# Patient Record
Sex: FEMALE | Race: WHITE | Employment: UNEMPLOYED | URBAN - METROPOLITAN AREA
[De-identification: names, ages, dates, MRNs, and addresses within clinical notes are randomized per-mention and may not be internally consistent; named-entity substitution may affect disease eponyms.]

---

## 2017-01-13 ENCOUNTER — TELEPHONE (OUTPATIENT)
Dept: OBGYN | Facility: CLINIC | Age: 27
End: 2017-01-13

## 2017-01-13 DIAGNOSIS — O21.0 HYPEREMESIS GRAVIDARUM: Primary | ICD-10-CM

## 2017-01-13 NOTE — TELEPHONE ENCOUNTER
rantitidine 150 mg tablets        Last Written Prescription Date: 4/1/16  Last Fill Quantity: 600mL, # refills: 4  Last Office Visit with G, P or [facility] prescribing provider: 9/2/16        BP Readings from Last 3 Encounters:   09/02/16 122/78   07/29/16 126/62   07/25/16 118/68     AST       38   1/2/2016  ALT       93   1/2/2016  CREATININE   Date Value Ref Range Status   01/02/2016 0.58 0.52 - 1.04 mg/dL Final

## 2017-01-13 NOTE — TELEPHONE ENCOUNTER
"This was removed from the medication list, but patient taking this.  Ok to continue per Dr Houser's notes on 1/11: \"continues to use home IV therapy and IV antiemetics     -oral (liquid) Zantac\"    Will route to OB provider.    Etta Bravo, RN  Message handled by Nurse Triage.      "

## 2017-01-19 ENCOUNTER — TELEPHONE (OUTPATIENT)
Dept: OBGYN | Facility: CLINIC | Age: 27
End: 2017-01-19

## 2017-01-19 NOTE — TELEPHONE ENCOUNTER
Pts  calling, they have concerns over a breast lump she has discovered on far side of her right breast.  She describes it as pea sized, maybe a bit larger, and is not painful.  Pt is breast feeding.  She would like appointment to get it checked.  Could you fit her in on Friday?  Please advise.

## 2017-01-20 NOTE — TELEPHONE ENCOUNTER
Called pt's , vm left for pt to call clinic back.    Called pt, vm left for pt to call clinic back

## 2017-01-20 NOTE — TELEPHONE ENCOUNTER
Pt's  calls back, relay wanted to schedule appt for pt, but MD is ending his day at this time.    Discussed with Dr. Houser, okayed appt for Monday.    Called pt's , scheduled appt for Mon.

## 2017-01-23 ENCOUNTER — OFFICE VISIT (OUTPATIENT)
Dept: OBGYN | Facility: CLINIC | Age: 27
End: 2017-01-23
Payer: COMMERCIAL

## 2017-01-23 VITALS
BODY MASS INDEX: 25.8 KG/M2 | SYSTOLIC BLOOD PRESSURE: 116 MMHG | WEIGHT: 159.8 LBS | DIASTOLIC BLOOD PRESSURE: 76 MMHG | TEMPERATURE: 98.5 F

## 2017-01-23 DIAGNOSIS — L98.9 SKIN LESION: Primary | ICD-10-CM

## 2017-01-23 DIAGNOSIS — K21.9 GASTROESOPHAGEAL REFLUX DISEASE WITHOUT ESOPHAGITIS: ICD-10-CM

## 2017-01-23 PROCEDURE — 99213 OFFICE O/P EST LOW 20 MIN: CPT | Performed by: OBSTETRICS & GYNECOLOGY

## 2017-01-23 RX ORDER — PANTOPRAZOLE SODIUM 40 MG/1
40 TABLET, DELAYED RELEASE ORAL DAILY
Qty: 30 TABLET | Refills: 1 | Status: SHIPPED | OUTPATIENT
Start: 2017-01-23 | End: 2017-03-24

## 2017-01-23 NOTE — PROGRESS NOTES
Here after noting small mass 'in R breast'    -present for about one week/nontender    -lactating    She also notes continuing acid reflux; using Zantac 150mg twice a day    O: small raised lesion (3 mm) in skin of R breast         3 cm from nipple at 4 o'clock       -no mass noted in breast       -no axillary LN    A: small lesion in skin of R breast      GERD; will add Protonix       -if persistent, referral to GI    P: continue to monitor/expect spontaneous resolution

## 2017-01-23 NOTE — NURSING NOTE
"Chief Complaint   Patient presents with     Breast Exam     lump on right breast x 1 month, headaches on and off since 7/2016.       Initial /76 mmHg  Temp(Src) 98.5  F (36.9  C) (Oral)  Wt 159 lb 12.8 oz (72.485 kg)  LMP 08/20/2016 (Exact Date) Estimated body mass index is 25.8 kg/(m^2) as calculated from the following:    Height as of 9/2/16: 5' 6\" (1.676 m).    Weight as of this encounter: 159 lb 12.8 oz (72.485 kg).  BP completed using cuff size: regular  Tran Centeno MA      "

## 2017-03-24 DIAGNOSIS — K21.9 GASTROESOPHAGEAL REFLUX DISEASE WITHOUT ESOPHAGITIS: ICD-10-CM

## 2017-03-24 NOTE — TELEPHONE ENCOUNTER
pantoprazole      Last Written Prescription Date: 1/23/17  Last Fill Quantity: 1/23/17,  # refills: 1   Last Office Visit with [facility], [facility] or [facility] prescribing provider: 1/23/17

## 2017-03-27 PROBLEM — K21.9 GASTROESOPHAGEAL REFLUX DISEASE WITHOUT ESOPHAGITIS: Status: ACTIVE | Noted: 2017-03-27

## 2017-03-27 RX ORDER — PANTOPRAZOLE SODIUM 40 MG/1
40 TABLET, DELAYED RELEASE ORAL DAILY
Qty: 30 TABLET | Refills: 1 | Status: SHIPPED | OUTPATIENT
Start: 2017-03-27 | End: 2018-03-15

## 2017-03-27 NOTE — TELEPHONE ENCOUNTER
Your last note:    GERD; will add Protonix  -if persistent, referral to GI    Do you want to refer or just refill?    Wanda Bryan R.N.

## 2017-04-03 NOTE — TELEPHONE ENCOUNTER
Spoke to pt and gave number for GI, pt still has sx and they are not getting better she states.  Referral entered.  Wanda Bryan R.N.

## 2017-04-03 NOTE — TELEPHONE ENCOUNTER
called back.  Told him to have pt cb as we dont have signed consent to speak with him.  He will let her know.  Wanda Bryan R.N.

## 2017-04-04 ENCOUNTER — TRANSFERRED RECORDS (OUTPATIENT)
Dept: HEALTH INFORMATION MANAGEMENT | Facility: CLINIC | Age: 27
End: 2017-04-04

## 2017-04-13 ENCOUNTER — TRANSFERRED RECORDS (OUTPATIENT)
Dept: HEALTH INFORMATION MANAGEMENT | Facility: CLINIC | Age: 27
End: 2017-04-13

## 2017-05-19 ENCOUNTER — TELEPHONE (OUTPATIENT)
Dept: OBGYN | Facility: CLINIC | Age: 27
End: 2017-05-19

## 2017-05-19 NOTE — TELEPHONE ENCOUNTER
Please advise      -it is good that she calls if still having 'heartburn' using Protonix; in this situation, further evaluation is usually important.  Recommend f/u with PCP or GI specialist

## 2017-05-19 NOTE — TELEPHONE ENCOUNTER
Call from spouse stating that pt continues to have heartburn and they are wondering if there is something else she could try. Pt currently takes Protonix 40 mg daily. States the heartburn started when she was pregnant and continues. Pt also previously tried Zantac liquid. Please advise.

## 2018-02-26 ENCOUNTER — OFFICE VISIT (OUTPATIENT)
Dept: OBGYN | Facility: CLINIC | Age: 28
End: 2018-02-26
Payer: MEDICAID

## 2018-02-26 VITALS
HEART RATE: 76 BPM | DIASTOLIC BLOOD PRESSURE: 60 MMHG | WEIGHT: 140 LBS | BODY MASS INDEX: 22.6 KG/M2 | SYSTOLIC BLOOD PRESSURE: 112 MMHG

## 2018-02-26 DIAGNOSIS — O21.0 HYPEREMESIS GRAVIDARUM: Primary | ICD-10-CM

## 2018-02-26 PROCEDURE — 99213 OFFICE O/P EST LOW 20 MIN: CPT | Performed by: OBSTETRICS & GYNECOLOGY

## 2018-02-26 RX ORDER — ONDANSETRON 8 MG/1
8 TABLET, FILM COATED ORAL EVERY 8 HOURS PRN
Qty: 30 TABLET | Refills: 1 | Status: ON HOLD | OUTPATIENT
Start: 2018-02-26 | End: 2018-10-13

## 2018-02-26 NOTE — LETTER
[facility]  [address], [address]  [place], [place]  [address]                                            TEL:[phone]  FAX:[phone]      Olimpia Bhakta  [address] [facility] [address]      February 26, 2018    To Whom It May Concern;    Ms. Olimpia Bhakta has a medical condition that caused vomiting and necessitated       -her leaving work.  She is her today for therapy        Sincerely,      Paula WILD

## 2018-02-26 NOTE — MR AVS SNAPSHOT
After Visit Summary   2/26/2018    Olimpia Bhakta    MRN: [id number]           Patient Information     Date Of Birth          1990        Visit Information        Provider Department      2/26/2018 6:00 PM Aubrey Houser MD [facility]        Today's Diagnoses     Hyperemesis gravidarum    -  1       Follow-ups after your visit        Your next 10 appointments already scheduled     Mar 15, 2018  1:15 PM CDT   (Arrive by 1:00 PM)   US OB > 14 WEEKS COMPLETE SINGLE with RIUS1   [facility]    [address]  [facility] [address]   [phone]           Please bring a list of your medicines (including vitamins, minerals and over-the-counter drugs). Also, tell your doctor about any allergies you may have. Wear comfortable clothes and leave your valuables at home.  If you re less than 20 weeks drink four 8-ounce glasses of fluid an hour before your exam. If you need to empty your bladder before your exam, try to release only a little urine. Then, drink another glass of fluid.  You may have up to two family members in the exam room. If you bring a small child, an adult must be there to care for him or her.  Please call the Imaging Department at your exam site with any questions.            Mar 15, 2018  2:00 PM CDT   New Prenatal with RI PRENATAL NURSE   [facility]    [address] [place] [address]   [phone]            Mar 30, 2018  2:30 PM CDT   New Prenatal with Aubrey Houser MD   [facility]    [address] [place] [address]   [phone]              Future tests that were ordered for you today     Open Future Orders        Priority Expected Expires Ordered    US OB > 14 Weeks Complete Single Routine  2/26/2019 2/26/2018            Who to contact     If you have  questions or need follow up information about today's clinic visit or your schedule please contact [facility] directly at [phone].  Normal or non-critical lab and imaging results will be communicated to you by Modalityhart, letter or phone within 4 business days after the clinic has received the results. If you do not hear from us within 7 days, please contact the clinic through Foodistat or phone. If you have a critical or abnormal lab result, we will notify you by phone as soon as possible.  Submit refill requests through Serus or call your pharmacy and they will forward the refill request to us. Please allow 3 business days for your refill to be completed.          Additional Information About Your Visit        ModalityharIP Ghoster Information     Serus gives you secure access to your electronic health record. If you see a primary care provider, you can also send messages to your care team and make appointments. If you have questions, please call your primary care clinic.  If you do not have a primary care provider, please call [phone] and they will assist you.        Care EveryWhere ID     This is your Care EveryWhere ID. This could be used by other organizations to access your [place] medical records  [id number]        Your Vitals Were     Pulse Last Period Breastfeeding? BMI (Body Mass Index)          76 01/14/2018 No 22.6 kg/m2         Blood Pressure from Last 3 Encounters:   02/26/18 112/60   01/23/17 116/76   09/02/16 122/78    Weight from Last 3 Encounters:   02/26/18 140 lb (63.5 kg)   01/23/17 159 lb 12.8 oz (72.5 kg)   09/02/16 148 lb 4.8 oz (67.3 kg)                 Today's Medication Changes          These changes are accurate as of 2/26/18  6:44 PM.  If you have any questions, ask your nurse or doctor.               Start taking these medicines.        Dose/Directions    ondansetron 8 MG tablet   Commonly known as:  ZOFRAN   Used for:  Hyperemesis gravidarum   Started by:  Mik  Aubrey MORRISON MD        Dose:  8 mg   Take 1 tablet (8 mg) by mouth every 8 hours as needed for nausea   Quantity:  30 tablet   Refills:  1         These medicines have changed or have updated prescriptions.        Dose/Directions    * ranitidine 150 MG/10ML syrup   Commonly known as:  Zantac   This may have changed:  Another medication with the same name was added. Make sure you understand how and when to take each.   Used for:  Hyperemesis gravidarum   Changed by:  Aubrey Houser MD        Dose:  4 mg/kg/day   Take 8 mLs (120 mg) by mouth 2 times daily   Quantity:  180 mL   Refills:  1       * ranitidine 150 MG tablet   Commonly known as:  ZANTAC   This may have changed:  You were already taking a medication with the same name, and this prescription was added. Make sure you understand how and when to take each.   Used for:  Hyperemesis gravidarum   Changed by:  Aubrey Houser MD        Dose:  150 mg   Take 1 tablet (150 mg) by mouth 2 times daily   Quantity:  60 tablet   Refills:  1       * Notice:  This list has 2 medication(s) that are the same as other medications prescribed for you. Read the directions carefully, and ask your doctor or other care provider to review them with you.         Where to get your medicines      These medications were sent to [facility] [address] [place] [address]    Hours:  24-hours Phone:  [phone]     ondansetron 8 MG tablet    ranitidine 150 MG tablet                Primary Care Provider Office Phone # Fax #    Aubrey Houser [phone]       [address] [address]        Equal Access to Services     [facility] : Hadii meggan stevenso Sosamantha, waaxda luqadaha, qaybta kaalmada adeegyada, chey morgan. So [facility] [phone].    ATENCIÓN: Si habla español, tiene a webber disposición servicios gratuitos de  asistencia lingüística. Tim al [phone].    We comply with applicable federal civil rights laws and [place] laws. We do not discriminate on the basis of race, color, national origin, age, disability, sex, sexual orientation, or gender identity.            Thank you!     Thank you for choosing [facility]  for your care. Our goal is always to provide you with excellent care. Hearing back from our patients is one way we can continue to improve our services. Please take a few minutes to complete the written survey that you may receive in the mail after your visit with us. Thank you!             Your Updated Medication List - Protect others around you: Learn how to safely use, store and throw away your medicines at www.disposemymeds.org.          This list is accurate as of 2/26/18  6:44 PM.  Always use your most recent med list.                   Brand Name Dispense Instructions for use Diagnosis    acetaminophen 325 MG tablet    TYLENOL    100 tablet    Take 2 tablets (650 mg) by mouth every 4 hours as needed for mild pain or fever (greater than or equal to 38? C /100.4? F (oral) or 38.5? C/ 101.4? F (core).)    Vaginal delivery       GNP CHILDRENS CHEWABLES/IRON 15 MG Chew           ibuprofen 400 MG tablet    ADVIL/MOTRIN    120 tablet    Take 1-2 tablets (400-800 mg) by mouth every 6 hours as needed for other (cramping)    Vaginal delivery       ondansetron 8 MG tablet    ZOFRAN    30 tablet    Take 1 tablet (8 mg) by mouth every 8 hours as needed for nausea    Hyperemesis gravidarum       pantoprazole 40 MG EC tablet    PROTONIX    30 tablet    Take 1 tablet (40 mg) by mouth daily Take 30-60 minutes before a meal.    Gastroesophageal reflux disease without esophagitis       * ranitidine 150 MG/10ML syrup    Zantac    180 mL    Take 8 mLs (120 mg) by mouth 2 times daily    Hyperemesis gravidarum       * ranitidine 150 MG tablet    ZANTAC    60 tablet    Take 1 tablet (150 mg) by mouth 2 times  daily    Hyperemesis gravidarum       * Notice:  This list has 2 medication(s) that are the same as other medications prescribed for you. Read the directions carefully, and ask your doctor or other care provider to review them with you.

## 2018-02-27 NOTE — PROGRESS NOTES
Here for nausea and vomiting in early pregnancy      -about 8 weeks of amenorrhea      -severe hyperemesis with last pregnancy; requiring IV hydration    O: no exam  A: hyperemesis with early IUP  P: ultrasound for confirmation of viability      Zofran/Zantac

## 2018-02-27 NOTE — NURSING NOTE
"Chief Complaint   Patient presents with     Nausea     c/o vomiting today - early pregnancy LMP 18.  Does not seem as much nausea as previous pregnancy.  Here today with spouse/ son       Initial /60  Pulse 76  Wt 140 lb (63.5 kg)  LMP 2018  Breastfeeding? No  BMI 22.6 kg/m2 Estimated body mass index is 22.6 kg/(m^2) as calculated from the following:    Height as of 16: 5' 6\" (1.676 m).    Weight as of this encounter: 140 lb (63.5 kg).  BP completed using cuff size: regular        The following HM Due: NONE                 "

## 2018-03-05 DIAGNOSIS — Z34.80 ENCOUNTER FOR SUPERVISION OF OTHER NORMAL PREGNANCY: Primary | ICD-10-CM

## 2018-03-15 ENCOUNTER — PRENATAL OFFICE VISIT (OUTPATIENT)
Dept: NURSING | Facility: CLINIC | Age: 28
End: 2018-03-15
Payer: MEDICAID

## 2018-03-15 ENCOUNTER — RADIANT APPOINTMENT (OUTPATIENT)
Dept: ULTRASOUND IMAGING | Facility: CLINIC | Age: 28
End: 2018-03-15
Attending: OBSTETRICS & GYNECOLOGY
Payer: MEDICAID

## 2018-03-15 DIAGNOSIS — O21.0 HYPEREMESIS GRAVIDARUM: ICD-10-CM

## 2018-03-15 DIAGNOSIS — Z34.80 ENCOUNTER FOR SUPERVISION OF OTHER NORMAL PREGNANCY: ICD-10-CM

## 2018-03-15 LAB
ABO + RH BLD: NORMAL
ABO + RH BLD: NORMAL
BETA HCG QUAL IFA URINE: POSITIVE
BLD GP AB SCN SERPL QL: NORMAL
BLOOD BANK CMNT PATIENT-IMP: NORMAL
ERYTHROCYTE [DISTWIDTH] IN BLOOD BY AUTOMATED COUNT: 12.4 % (ref 10–15)
HCT VFR BLD AUTO: 31 % (ref 35–47)
HGB BLD-MCNC: 10.5 G/DL (ref 11.7–15.7)
MCH RBC QN AUTO: 30.1 PG (ref 26.5–33)
MCHC RBC AUTO-ENTMCNC: 33.9 G/DL (ref 31.5–36.5)
MCV RBC AUTO: 89 FL (ref 78–100)
PLATELET # BLD AUTO: 213 10E9/L (ref 150–450)
RBC # BLD AUTO: 3.49 10E12/L (ref 3.8–5.2)
SPECIMEN EXP DATE BLD: NORMAL
WBC # BLD AUTO: 6 10E9/L (ref 4–11)

## 2018-03-15 PROCEDURE — 86850 RBC ANTIBODY SCREEN: CPT | Performed by: OBSTETRICS & GYNECOLOGY

## 2018-03-15 PROCEDURE — 86900 BLOOD TYPING SEROLOGIC ABO: CPT | Performed by: OBSTETRICS & GYNECOLOGY

## 2018-03-15 PROCEDURE — 85027 COMPLETE CBC AUTOMATED: CPT | Performed by: OBSTETRICS & GYNECOLOGY

## 2018-03-15 PROCEDURE — 99207 ZZC NO CHARGE NURSE ONLY: CPT

## 2018-03-15 PROCEDURE — 76815 OB US LIMITED FETUS(S): CPT | Performed by: FAMILY MEDICINE

## 2018-03-15 PROCEDURE — 87086 URINE CULTURE/COLONY COUNT: CPT | Performed by: OBSTETRICS & GYNECOLOGY

## 2018-03-15 PROCEDURE — 76817 TRANSVAGINAL US OBSTETRIC: CPT | Performed by: FAMILY MEDICINE

## 2018-03-15 PROCEDURE — 87340 HEPATITIS B SURFACE AG IA: CPT | Performed by: OBSTETRICS & GYNECOLOGY

## 2018-03-15 PROCEDURE — 87389 HIV-1 AG W/HIV-1&-2 AB AG IA: CPT | Performed by: OBSTETRICS & GYNECOLOGY

## 2018-03-15 PROCEDURE — 84703 CHORIONIC GONADOTROPIN ASSAY: CPT | Performed by: OBSTETRICS & GYNECOLOGY

## 2018-03-15 PROCEDURE — 86901 BLOOD TYPING SEROLOGIC RH(D): CPT | Performed by: OBSTETRICS & GYNECOLOGY

## 2018-03-15 PROCEDURE — 36415 COLL VENOUS BLD VENIPUNCTURE: CPT | Performed by: OBSTETRICS & GYNECOLOGY

## 2018-03-15 PROCEDURE — 86762 RUBELLA ANTIBODY: CPT | Performed by: OBSTETRICS & GYNECOLOGY

## 2018-03-15 PROCEDURE — 86780 TREPONEMA PALLIDUM: CPT | Performed by: OBSTETRICS & GYNECOLOGY

## 2018-03-15 NOTE — MR AVS SNAPSHOT
After Visit Summary   3/15/2018    Olimpia Bhakta    MRN: [id number]           Patient Information     Date Of Birth          1990        Visit Information        Provider Department      3/15/2018 2:00 PM RI PRENATAL NURSE [facility]        Today's Diagnoses     Encounter for supervision of other normal pregnancy           Follow-ups after your visit        Your next 10 appointments already scheduled     Mar 30, 2018  2:30 PM CDT   New Prenatal with Aubrey Houser MD   [facility] ([facility])    [address]  [facility] [id number] [phone]              Who to contact     If you have questions or need follow up information about today's clinic visit or your schedule please contact [facility] directly at [phone].  Normal or non-critical lab and imaging results will be communicated to you by MyChart, letter or phone within 4 business days after the clinic has received the results. If you do not hear from us within 7 days, please contact the clinic through Ai2 UKhart or phone. If you have a critical or abnormal lab result, we will notify you by phone as soon as possible.  Submit refill requests through Octreo[employer] Sciences or call your pharmacy and they will forward the refill request to us. Please allow 3 business days for your refill to be completed.          Additional Information About Your Visit        MyChart Information     [employer] gives you secure access to your electronic health record. If you see a primary care provider, you can also send messages to your care team and make appointments. If you have questions, please call your primary care clinic.  If you do not have a primary care provider, please call [phone] and they will assist you.        Care EveryWhere ID     This is your Care EveryWhere ID. This could be used by other organizations to access your [place] medical records  [id number]         Your Vitals Were     Last Period                   01/14/2018 (Exact Date)            Blood Pressure from Last 3 Encounters:   02/26/18 112/60   01/23/17 116/76   09/02/16 122/78    Weight from Last 3 Encounters:   02/26/18 140 lb (63.5 kg)   01/23/17 159 lb 12.8 oz (72.5 kg)   09/02/16 148 lb 4.8 oz (67.3 kg)              We Performed the Following     ABO/Rh type and screen     Anti Treponema     Beta HCG qual IFA urine - [facility] and [place]     CBC with platelets     Hepatitis B surface antigen     HIV Antigen Antibody Combo     Rubella Antibody IgG Quantitative     Urine Culture Aerobic Bacterial        Primary Care Provider Office Phone # Fax #    Aubrey Houser [phone]       [address] [address]        Equal Access to Services     DK COOK : Hadii aad ku hadasho Soomaali, waaxda luqadaha, qaybta kaalmada adeegyada, chey blandin hayalvaro servin . So [facility] [phone].    ATENCIÓN: Si habla español, tiene a webber disposición servicios gratuitos de asistencia lingüística. Tim al [phone].    We comply with applicable federal civil rights laws and [place] laws. We do not discriminate on the basis of race, color, national origin, age, disability, sex, sexual orientation, or gender identity.            Thank you!     Thank you for choosing [facility]  for your care. Our goal is always to provide you with excellent care. Hearing back from our patients is one way we can continue to improve our services. Please take a few minutes to complete the written survey that you may receive in the mail after your visit with us. Thank you!             Your Updated Medication List - Protect others around you: Learn how to safely use, store and throw away your medicines at www.disposemymeds.org.          This list is accurate as of 3/15/18  4:02 PM.  Always use your most recent med list.                   Brand Name Dispense Instructions for  use Diagnosis    ondansetron 8 MG tablet    ZOFRAN    30 tablet    Take 1 tablet (8 mg) by mouth every 8 hours as needed for nausea    Hyperemesis gravidarum       ranitidine 150 MG tablet    ZANTAC    60 tablet    Take 1 tablet (150 mg) by mouth 2 times daily    Hyperemesis gravidarum

## 2018-03-15 NOTE — NURSING NOTE
NPN nurse visit. 1st dr visit scheduled for 3/30/18 with Aubrey Houser M.D. Pap not due. Last pap 1/2016.  8w4d    GENESIS Garrido RN

## 2018-03-16 LAB
BACTERIA SPEC CULT: NO GROWTH
HBV SURFACE AG SERPL QL IA: NONREACTIVE
HIV 1+2 AB+HIV1 P24 AG SERPL QL IA: NONREACTIVE
RUBV IGG SERPL IA-ACNC: 47 IU/ML
SPECIMEN SOURCE: NORMAL
T PALLIDUM IGG+IGM SER QL: NEGATIVE

## 2018-04-06 ENCOUNTER — PRENATAL OFFICE VISIT (OUTPATIENT)
Dept: OBGYN | Facility: CLINIC | Age: 28
End: 2018-04-06
Payer: MEDICAID

## 2018-04-06 VITALS
WEIGHT: 139.5 LBS | DIASTOLIC BLOOD PRESSURE: 70 MMHG | SYSTOLIC BLOOD PRESSURE: 114 MMHG | HEIGHT: 66 IN | BODY MASS INDEX: 22.42 KG/M2

## 2018-04-06 DIAGNOSIS — Z34.81 ENCOUNTER FOR SUPERVISION OF OTHER NORMAL PREGNANCY IN FIRST TRIMESTER: Primary | ICD-10-CM

## 2018-04-06 PROBLEM — Z34.80 ENCOUNTER FOR SUPERVISION OF OTHER NORMAL PREGNANCY: Status: ACTIVE | Noted: 2018-04-06

## 2018-04-06 PROCEDURE — 99214 OFFICE O/P EST MOD 30 MIN: CPT | Performed by: ADVANCED PRACTICE MIDWIFE

## 2018-04-06 PROCEDURE — 87591 N.GONORRHOEAE DNA AMP PROB: CPT | Performed by: ADVANCED PRACTICE MIDWIFE

## 2018-04-06 PROCEDURE — 87491 CHLMYD TRACH DNA AMP PROBE: CPT | Performed by: ADVANCED PRACTICE MIDWIFE

## 2018-04-06 NOTE — NURSING NOTE
"Chief Complaint   Patient presents with     Prenatal Care       Initial /70  Ht 5' 6\" (1.676 m)  Wt 139 lb 8 oz (63.3 kg)  LMP 01/14/2018 (Exact Date)  BMI 22.52 kg/m2 Estimated body mass index is 22.52 kg/(m^2) as calculated from the following:    Height as of this encounter: 5' 6\" (1.676 m).    Weight as of this encounter: 139 lb 8 oz (63.3 kg).  Medication Reconciliation: complete     11w5d    Do Squires, JAJA      "

## 2018-04-06 NOTE — PROGRESS NOTES
Olimpia Bhakta is a 27 year old  ,  who is not a previous CNM patient. She presents for a new OB Visit. This was a planned pregnancy.     FOB is in good health.  FOB IS actively involved in relationship and this pregnancy.    Olimpia presents for her NOB. She has no significant PMH/PSH. She desires physician care in pregnancy. Labs WNL. U/S showed subchorionic hemorrhage and ovarian cyst, will monitor.   She had an episode of light spotting in early pregnancy, nothing recently.    She denies abdominal pain since her LMP.  She has had nausea.  has had vomiting. She has not had any vomiting in the last 3 days, reports getting better.   Any personal or family history of blood clots? No  History of sickle cell anemia or trait? No         Patient's last menstrual period was 2018 (exact date)..  Estimated Date of Delivery: Oct 21, 2018 Ultrasound consistent with LMP.    MENSTRUAL HISTORY    frequency: every 28 days  Last PAP:  2016  History of abnormal Pap?  No    Health maintenance updated:  yes        Current medications are:    Current Outpatient Prescriptions:      Pediatric Multiple Vit-C-FA (CHILDRENS CHEWABLE VITAMINS PO), , Disp: , Rfl:      ondansetron (ZOFRAN) 8 MG tablet, Take 1 tablet (8 mg) by mouth every 8 hours as needed for nausea, Disp: 30 tablet, Rfl: 1     ranitidine (ZANTAC) 150 MG tablet, Take 1 tablet (150 mg) by mouth 2 times daily, Disp: 60 tablet, Rfl: 1     INFECTION HISTORY  HIV: No  Hepatitis B: No  Hepatitis C: No  Tuberculosis: No    Genital Herpes self: no  Herpes partner:  no  Chlamydia:  no  Gonorrhea:  no  HPV: No  BV:  No  Syphilis:  No  Chicken Pox:  No      OB HISTORY  Obstetric History       T1      L1     SAB1   TAB0   Ectopic0   Multiple0   Live Births1       # Outcome Date GA Lbr Christiano/2nd Weight Sex Delivery Anes PTL Lv   3 Current            2 Term 16 39w3d 05:01  00:32 6 lb 15.3 oz (3.155 kg) M Vag-Spont EPI N FERNANDO     "  Name: NERY WAGNER      Apgar1:  9                Apgar5: 9   1 SAB 08/13/15                  History of GDM: No,  PTL : No,  History of HTN in pregnancy: No,  Thrombocytopenia: No,  Shoulder dystocia: No,  Vacuum Extraction: No  PPH: No   3rd of 4th degree laceration: No.   Other complications: No    PERSONAL HISTORY  Exercise Habits:  walking irregularly days per week.  Employment: Full time.  Her job involves light activity with no potential for toxic exposure.    Travel plans:  are none planned.   Diet: eats regular meals and takes daily vitamins  Abuse concerns? No  Hgb A1c screen:  BMI > 30: No, 1st degree family DM: No, History of GDM: No, PCOS: No, High risk ethnicity: No    Social History     Social History     Marital status:      Spouse name: Sharon     Number of children: 1     Years of education: N/A     Occupational History           Social History Main Topics     Smoking status: Never Smoker     Smokeless tobacco: Never Used     Alcohol use No     Drug use: No     Sexual activity: Yes     Partners: Male     Other Topics Concern     Not on file     Social History Narrative         She  reports that she has never smoked. She has never used smokeless tobacco.    STD testing offered?  Accepted  Last PHQ-9 score on record =   PHQ-9 SCORE 9/2/2016   Total Score 0     Last GAD7 score on record = No flowsheet data found.  Alcohol Score = 0  Referral/Meds needed? no    PAST MEDICAL/SURGICAL HISTORY  Past Medical History:   Diagnosis Date     Constipation      Eczema, dyshidrotic      Migraine      Past Surgical History:   Procedure Laterality Date     APPENDECTOMY         FAMILY HISTORY  Family History   Problem Relation Age of Onset     Hypertension Mother          ROS:  12 point review of systems negative other than symptoms noted below.      PHYSICAL EXAM  Vitals: /70  Ht 5' 6\" (1.676 m)  Wt 139 lb 8 oz (63.3 kg)  LMP 01/14/2018 (Exact Date)  BMI 22.52 kg/m2  BMI= " Body mass index is 22.52 kg/(m^2).     GENERAL:  27 year old pleasant pregnant female, alert, cooperative and well groomed.  NECK:  Thyroid without enlargement and nodules.  Lymph nodes not palpable.   LUNGS:  Clear to auscultation.  BREAST:  Symmetrical without lesions or nodes.  Nipples everted.  Areolas symmetric.  No palpable axillary nodes.  HEART:  RRR without murmur.  ABDOMEN: Soft without masses or tenderness.  No scars noted..  GENITALIA: BUS without tenderness or inflammation.  Perineum without lesions.    VAGINA:  Pink, normal rugae and discharge.  CERVIX:  Posterior, smooth, without discharge, and firm/ closed    UTERUS: Anteverted, nontender 12 weeks in size.  ADNEXA: Without masses or tenderness  RECTAL:  Normal appearance.  Digital exam deferred.  LOWER EXTREMITIES: No edema. No significant varicosities.    ASSESSMENT/PLAN:    IUP at 11w5d    ICD-10-CM    1. Encounter for supervision of other normal pregnancy in first trimester Z34.81 NEISSERIA GONORRHOEA PCR     CHLAMYDIA TRACHOMATIS PCR        consult for US for AMA patients: NA  Genetic Testing reviewed and discussed, patient desires Progenity, desires to call insurance company first too see what they will have to pay.     COUNSELING    Instructed on use of triage nurse line and contacting the on call CNM after hours in an emergency.     Symptoms of N&V and fatigue usually start to resolve around 12-16 weeks     Reviewed CNM philosophy, call schedule for labor and delivery, and FSH for delivery    1st OB handout given outlining appointment spacing and CNM information    Reviewed exercise and nutrition    Recommend to gain 15 pounds with her pregnancy.    Discussed OTC medications. OB med list given    Encouraged patient to arrange  if needed    Encouraged patient to take PNV's/DHA    Travel precautions discussed, no air travel after 36 weeks and Zika Virus discussed    Will call patient with lab results when available    Does  patient desire a RN home visit from the [facility]?  No    If yes, paperwork completed?  No     F/U to be addressed next visit:  Progenity testing, Rx's given, referrals    Will return to the clinic in 4 weeks for her next routine prenatal check.  Will call to be seen sooner if problems arise.    Cultures collected. Oriented to practice. Reviewed pregnancy at this time.     Stephanie Barrera CNM

## 2018-04-06 NOTE — MR AVS SNAPSHOT
After Visit Summary   4/6/2018    Olimpia Bhakta    MRN: [id number]           Patient Information     Date Of Birth          1990        Visit Information        Provider Department      4/6/2018 3:00 PM Stephanie Barrera APRN CNM [facility]        Today's Diagnoses     Encounter for supervision of other normal pregnancy in first trimester    -  1       Follow-ups after your visit        Follow-up notes from your care team     Return in about 4 weeks (around 5/4/2018) for Prenatal Visit.      Your next 10 appointments already scheduled     Apr 30, 2018  6:00 PM CDT   ESTABLISHED PRENATAL with Aubrey Houser MD   [facility]    [address]  [facility] [id number] [phone]            Jun 01, 2018  2:00 PM CDT   ESTABLISHED PRENATAL with Aubrey Houser MD   [facility] ([facility])    [address]  [facility] [id number] [phone]              Who to contact     If you have questions or need follow up information about today's clinic visit or your schedule please contact [facility] directly at [phone].  Normal or non-critical lab and imaging results will be communicated to you by MyChart, letter or phone within 4 business days after the clinic has received the results. If you do not hear from us within 7 days, please contact the clinic through [employer] or phone. If you have a critical or abnormal lab result, we will notify you by phone as soon as possible.  Submit refill requests through [employer] or call your pharmacy and they will forward the refill request to us. Please allow 3 business days for your refill to be completed.          Additional Information About Your Visit        [employer] Information     [employer] gives you secure access to your electronic health record. If you see a primary care provider, you can also send  "messages to your care team and make appointments. If you have questions, please call your primary care clinic.  If you do not have a primary care provider, please call [phone] and they will assist you.        Care EveryWhere ID     This is your Care EveryWhere ID. This could be used by other organizations to access your [place] medical records  [id number]        Your Vitals Were     Height Last Period BMI (Body Mass Index)             5' 6\" (1.676 m) 01/14/2018 (Exact Date) 22.52 kg/m2          Blood Pressure from Last 3 Encounters:   04/06/18 114/70   02/26/18 112/60   01/23/17 116/76    Weight from Last 3 Encounters:   04/06/18 139 lb 8 oz (63.3 kg)   02/26/18 140 lb (63.5 kg)   01/23/17 159 lb 12.8 oz (72.5 kg)              We Performed the Following     CHLAMYDIA TRACHOMATIS PCR     CHLAMYDIA TRACHOMATIS PCR     NEISSERIA GONORRHOEA PCR     NEISSERIA GONORRHOEA PCR        Primary Care Provider Office Phone # Fax #    Aubrey Houser [phone]       [address] [facility]  [address]  [facility] [address]        Equal Access to Services     [facility] UCLATAMIKO AH: Hadii aad ku hadasho Soomaali, waaxda luqadaha, qaybta kaalmada adeegyada, waxay edwinain hayemileen hemanth rios la'emileen ah. So [facility] [phone].    ATENCIÓN: Si habla español, tiene a webber disposición servicios gratuitos de asistencia lingüística. [facility] [phone].    We comply with applicable federal civil rights laws and [place] laws. We do not discriminate on the basis of race, color, national origin, age, disability, sex, sexual orientation, or gender identity.            Thank you!     Thank you for choosing [facility]  for your care. Our goal is always to provide you with excellent care. Hearing back from our patients is one way we can continue to improve our services. Please take a few minutes to complete the written survey that you may receive in the mail after your visit with us. Thank you!             Your Updated " Medication List - Protect others around you: Learn how to safely use, store and throw away your medicines at www.disposemymeds.org.          This list is accurate as of 4/6/18  3:49 PM.  Always use your most recent med list.                   Brand Name Dispense Instructions for use Diagnosis    CHILDRENS CHEWABLE VITAMINS PO           ondansetron 8 MG tablet    ZOFRAN    30 tablet    Take 1 tablet (8 mg) by mouth every 8 hours as needed for nausea    Hyperemesis gravidarum       ranitidine 150 MG tablet    ZANTAC    60 tablet    Take 1 tablet (150 mg) by mouth 2 times daily    Hyperemesis gravidarum

## 2018-04-06 NOTE — LETTER
April 6, 2018      Olimpia [facility]  [address] [facility] [address]        To Whom It May Concern,      Due to pregnancy, patient cannot lift more than 25lbs, must be able to have water with her at all times, and be able to take breaks or go home early as needed.           Sincerely,        CYRUS Mckinney CNM

## 2018-04-08 LAB
C TRACH DNA SPEC QL NAA+PROBE: NEGATIVE
N GONORRHOEA DNA SPEC QL NAA+PROBE: NEGATIVE
SPECIMEN SOURCE: NORMAL
SPECIMEN SOURCE: NORMAL

## 2018-04-18 ENCOUNTER — PRENATAL OFFICE VISIT (OUTPATIENT)
Dept: OBGYN | Facility: CLINIC | Age: 28
End: 2018-04-18
Payer: MEDICAID

## 2018-04-18 VITALS
HEART RATE: 72 BPM | WEIGHT: 137 LBS | SYSTOLIC BLOOD PRESSURE: 102 MMHG | DIASTOLIC BLOOD PRESSURE: 56 MMHG | BODY MASS INDEX: 22.11 KG/M2

## 2018-04-18 DIAGNOSIS — Z34.82 ENCOUNTER FOR SUPERVISION OF OTHER NORMAL PREGNANCY IN SECOND TRIMESTER: Primary | ICD-10-CM

## 2018-04-18 DIAGNOSIS — N76.0 VAGINITIS AND VULVOVAGINITIS: ICD-10-CM

## 2018-04-18 LAB
SPECIMEN SOURCE: NORMAL
WET PREP SPEC: NORMAL

## 2018-04-18 PROCEDURE — 87210 SMEAR WET MOUNT SALINE/INK: CPT | Performed by: OBSTETRICS & GYNECOLOGY

## 2018-04-18 PROCEDURE — 99212 OFFICE O/P EST SF 10 MIN: CPT | Performed by: OBSTETRICS & GYNECOLOGY

## 2018-04-18 RX ORDER — TRIAMCINOLONE ACETONIDE 1 MG/G
OINTMENT TOPICAL
Qty: 80 G | Refills: 1 | Status: SHIPPED | OUTPATIENT
Start: 2018-04-18 | End: 2020-08-05

## 2018-04-18 NOTE — NURSING NOTE
"Chief Complaint   Patient presents with     Prenatal Care     vaginal itching - denies increased discharge - nausea and vomiting - wt loss     13w3d    Initial /56 (BP Location: Right arm, Patient Position: Chair, Cuff Size: Adult Regular)  Pulse 72  Wt 137 lb (62.1 kg)  LMP 01/14/2018 (Exact Date)  BMI 22.11 kg/m2 Estimated body mass index is 22.11 kg/(m^2) as calculated from the following:    Height as of 4/6/18: 5' 6\" (1.676 m).    Weight as of this encounter: 137 lb (62.1 kg).  Medication Reconciliation: complete     Nurse assisted visit.  Margaret Garcia MA.      "

## 2018-04-18 NOTE — MR AVS SNAPSHOT
After Visit Summary   4/18/2018    Olimpia Bhakta    MRN: [id number]           Patient Information     Date Of Birth          1990        Visit Information        Provider Department      4/18/2018 6:15 PM Aubrey Houser MD [facility]        Today's Diagnoses     Encounter for supervision of other normal pregnancy in second trimester    -  1    Vaginitis and vulvovaginitis           Follow-ups after your visit        Follow-up notes from your care team     Return in about 4 weeks (around 5/16/2018).      Your next 10 appointments already scheduled     Apr 30, 2018  6:00 PM CDT   ESTABLISHED PRENATAL with Aubrey Huoser MD   [facility] ([facility])    [address]  [facility] [address]   [phone]            Jun 01, 2018  2:00 PM CDT   ESTABLISHED PRENATAL with Aubrey Houser MD   [facility] ([facility])    [address]  [facility] [address]   [phone]              Future tests that were ordered for you today     Open Future Orders        Priority Expected Expires Ordered    US OB > 14 Weeks Complete Single Routine  4/18/2019 4/18/2018            Who to contact     If you have questions or need follow up information about today's clinic visit or your schedule please contact [facility] directly at [phone].  Normal or non-critical lab and imaging results will be communicated to you by MyChart, letter or phone within 4 business days after the clinic has received the results. If you do not hear from us within 7 days, please contact the clinic through MyChart or phone. If you have a critical or abnormal lab result, we will notify you by phone as soon as possible.  Submit refill requests through [employer] or call your pharmacy and they will forward the refill request to us. Please allow 3 business days for your refill to be completed.          Additional  Information About Your Visit        MyChart Information     [employer] gives you secure access to your electronic health record. If you see a primary care provider, you can also send messages to your care team and make appointments. If you have questions, please call your primary care clinic.  If you do not have a primary care provider, please call [phone] and they will assist you.        Care EveryWhere ID     This is your Care EveryWhere ID. This could be used by other organizations to access your [place] medical records  [id number]        Your Vitals Were     Pulse Last Period BMI (Body Mass Index)             72 01/14/2018 (Exact Date) 22.11 kg/m2          Blood Pressure from Last 3 Encounters:   04/18/18 102/56   04/06/18 114/70   02/26/18 112/60    Weight from Last 3 Encounters:   04/18/18 137 lb (62.1 kg)   04/06/18 139 lb 8 oz (63.3 kg)   02/26/18 140 lb (63.5 kg)              We Performed the Following     Wet prep          Today's Medication Changes          These changes are accurate as of 4/18/18  6:51 PM.  If you have any questions, ask your nurse or doctor.               Start taking these medicines.        Dose/Directions    triamcinolone 0.1 % ointment   Commonly known as:  KENALOG   Used for:  Vaginitis and vulvovaginitis   Started by:  Aubrey Houser MD        Apply sparingly to affected area three times daily for 14 days.   Quantity:  80 g   Refills:  1            Where to get your medicines      These medications were sent to [facility] [address] [place] [address]  [id number] [place] [address]    Hours:  24-hours Phone:  [phone]     triamcinolone 0.1 % ointment                Primary Care Provider Office Phone # Fax #    Aubrey Houser [phone]       [address]  [address]  [facility] [address]        Equal Access to Services     DARRON COOK AH: haley Champion  priscilla violettebrennen guthriestacey niichey baugh. So [facility] [phone].    ATENCIÓN: Si ivonne cooper, tiene a webber disposición servicios gratuitos de asistencia lingüística. Tim al [phone].    We comply with applicable federal civil rights laws and [place] laws. We do not discriminate on the basis of race, color, national origin, age, disability, sex, sexual orientation, or gender identity.            Thank you!     Thank you for choosing [facility]  for your care. Our goal is always to provide you with excellent care. Hearing back from our patients is one way we can continue to improve our services. Please take a few minutes to complete the written survey that you may receive in the mail after your visit with us. Thank you!             Your Updated Medication List - Protect others around you: Learn how to safely use, store and throw away your medicines at www.disposemymeds.org.          This list is accurate as of 4/18/18  6:51 PM.  Always use your most recent med list.                   Brand Name Dispense Instructions for use Diagnosis    CHILDRENS CHEWABLE VITAMINS PO           ondansetron 8 MG tablet    ZOFRAN    30 tablet    Take 1 tablet (8 mg) by mouth every 8 hours as needed for nausea    Hyperemesis gravidarum       ranitidine 150 MG tablet    ZANTAC    60 tablet    Take 1 tablet (150 mg) by mouth 2 times daily    Hyperemesis gravidarum       triamcinolone 0.1 % ointment    KENALOG    80 g    Apply sparingly to affected area three times daily for 14 days.    Vaginitis and vulvovaginitis

## 2018-04-18 NOTE — PROGRESS NOTES
IUP at 13 3/7 weeks;      Continues to have nausea; but emesis better     Notes vaginal itching; T&M negative       -empiric therapy with triamcinolone

## 2018-04-30 ENCOUNTER — PRENATAL OFFICE VISIT (OUTPATIENT)
Dept: OBGYN | Facility: CLINIC | Age: 28
End: 2018-04-30
Payer: MEDICAID

## 2018-04-30 VITALS
SYSTOLIC BLOOD PRESSURE: 110 MMHG | HEART RATE: 72 BPM | WEIGHT: 138 LBS | BODY MASS INDEX: 22.27 KG/M2 | DIASTOLIC BLOOD PRESSURE: 60 MMHG

## 2018-04-30 DIAGNOSIS — Z34.82 ENCOUNTER FOR SUPERVISION OF OTHER NORMAL PREGNANCY IN SECOND TRIMESTER: Primary | ICD-10-CM

## 2018-04-30 PROCEDURE — 99000 SPECIMEN HANDLING OFFICE-LAB: CPT | Performed by: OBSTETRICS & GYNECOLOGY

## 2018-04-30 PROCEDURE — 36415 COLL VENOUS BLD VENIPUNCTURE: CPT | Performed by: OBSTETRICS & GYNECOLOGY

## 2018-04-30 PROCEDURE — 99212 OFFICE O/P EST SF 10 MIN: CPT | Performed by: OBSTETRICS & GYNECOLOGY

## 2018-04-30 PROCEDURE — 40000791 ZZHCL STATISTIC VERIFI PRENATAL TRISOMY 21,18,13: Mod: 90 | Performed by: OBSTETRICS & GYNECOLOGY

## 2018-04-30 NOTE — MR AVS SNAPSHOT
After Visit Summary   4/30/2018    Olimpia Bhakta    MRN: [id number]           Patient Information     Date Of Birth          1990        Visit Information        Provider Department      4/30/2018 6:00 PM Aubrey Houser MD [facility]        Today's Diagnoses     Encounter for supervision of other normal pregnancy in second trimester    -  1       Follow-ups after your visit        Your next 10 appointments already scheduled     Jun 04, 2018  6:15 PM CDT   (Arrive by 6:00 PM)   US OB > 14 WEEKS COMPLETE SINGLE with RIUS1   [facility]    [address]  [facility] [address] [phone]           Please bring a list of your medicines (including vitamins, minerals and over-the-counter drugs). Also, tell your doctor about any allergies you may have. Wear comfortable clothes and leave your valuables at home.  If you re less than 20 weeks drink four 8-ounce glasses of fluid an hour before your exam. If you need to empty your bladder before your exam, try to release only a little urine. Then, drink another glass of fluid.  You may have up to two family members in the exam room. If you bring a small child, an adult must be there to care for him or her.  Please call the Imaging Department at your exam site with any questions.            Jun 04, 2018  7:00 PM CDT   ESTABLISHED PRENATAL with Aubrey Houser MD   [facility]    [address] [place] [id number] [phone]              Future tests that were ordered for you today     Open Future Orders        Priority Expected Expires Ordered    US OB > 14 Weeks Complete Single Routine  4/30/2019 4/30/2018            Who to contact     If you have questions or need follow up information about today's clinic visit or your schedule please contact [facility] directly at  [phone].  Normal or non-critical lab and imaging results will be communicated to you by MyChart, letter or phone within 4 business days after the clinic has received the results. If you do not hear from us within 7 days, please contact the clinic through [employer] or phone. If you have a critical or abnormal lab result, we will notify you by phone as soon as possible.  Submit refill requests through [employer] or call your pharmacy and they will forward the refill request to us. Please allow 3 business days for your refill to be completed.          Additional Information About Your Visit        [employer] Information     [employer] gives you secure access to your electronic health record. If you see a primary care provider, you can also send messages to your care team and make appointments. If you have questions, please call your primary care clinic.  If you do not have a primary care provider, please call [phone] and they will assist you.        Care EveryWhere ID     This is your Care EveryWhere ID. This could be used by other organizations to access your [place] medical records  [id number]        Your Vitals Were     Pulse Last Period BMI (Body Mass Index)             72 01/14/2018 (Exact Date) 22.27 kg/m2          Blood Pressure from Last 3 Encounters:   04/30/18 110/60   04/18/18 102/56   04/06/18 114/70    Weight from Last 3 Encounters:   04/30/18 138 lb (62.6 kg)   04/18/18 137 lb (62.1 kg)   04/06/18 139 lb 8 oz (63.3 kg)              We Performed the Following     Non Invasive Prenatal Test Cell Free DNA        Primary Care Provider Office Phone # Fax #    Aubrey Houser [phone]       [address]  [facility] [address]        Equal Access to Services     [facility] : Hadii meggan Fine, wahelio wells, qaybta ashley baumann, chey morgan. So [facility] [phone].    ATENCIÓN: Si habla español, tiene a webber disposición servicios  tea de asistencia lingüística. Tim michele [phone].    We comply with applicable federal civil rights laws and [place] laws. We do not discriminate on the basis of race, color, national origin, age, disability, sex, sexual orientation, or gender identity.            Thank you!     Thank you for choosing [facility]  for your care. Our goal is always to provide you with excellent care. Hearing back from our patients is one way we can continue to improve our services. Please take a few minutes to complete the written survey that you may receive in the mail after your visit with us. Thank you!             Your Updated Medication List - Protect others around you: Learn how to safely use, store and throw away your medicines at www.disposemymeds.org.          This list is accurate as of 4/30/18  6:29 PM.  Always use your most recent med list.                   Brand Name Dispense Instructions for use Diagnosis    CHILDRENS CHEWABLE VITAMINS PO           ondansetron 8 MG tablet    ZOFRAN    30 tablet    Take 1 tablet (8 mg) by mouth every 8 hours as needed for nausea    Hyperemesis gravidarum       ranitidine 150 MG tablet    ZANTAC    60 tablet    Take 1 tablet (150 mg) by mouth 2 times daily    Hyperemesis gravidarum       triamcinolone 0.1 % ointment    KENALOG    80 g    Apply sparingly to affected area three times daily for 14 days.    Vaginitis and vulvovaginitis

## 2018-04-30 NOTE — NURSING NOTE
"Chief Complaint   Patient presents with     Prenatal Care     cramping that started yesterday - some back pain today - denies bleeding or spotting     15w1d - progenity testing    Initial /60 (BP Location: Right arm, Patient Position: Chair, Cuff Size: Adult Regular)  Pulse 72  Wt 138 lb (62.6 kg)  LMP 01/14/2018 (Exact Date)  BMI 22.27 kg/m2 Estimated body mass index is 22.27 kg/(m^2) as calculated from the following:    Height as of 4/6/18: 5' 6\" (1.676 m).    Weight as of this encounter: 138 lb (62.6 kg).  Medication Reconciliation: complete     Nurse assisted visit.  Margaret Garcia MA.      "

## 2018-04-30 NOTE — PROGRESS NOTES
IUP at 15 1/7 weeks      -nausea/vomiting now resolved      -desires Progenity  Ultrasound ordered

## 2018-05-07 ENCOUNTER — TELEPHONE (OUTPATIENT)
Dept: OBGYN | Facility: CLINIC | Age: 28
End: 2018-05-07

## 2018-05-07 NOTE — TELEPHONE ENCOUNTER
Results received from [facility] testing in [facility].  Testing done:  Innatal Prenatal Screen    Action:  LM for pt to cb to report NORMAL results.    Gender: **BOY**  Will ask pt if they wish to know the gender.    Please route to provider as FYI once pt is informed.    GENESIS Garrido RN

## 2018-05-07 NOTE — TELEPHONE ENCOUNTER
pts  called back, as pt does not speak english well. (consent to communicate on file).     Results given to him.     Gender revealed over the phone.       Routed to the md. GENESIS Garrido RN

## 2018-06-04 ENCOUNTER — RADIANT APPOINTMENT (OUTPATIENT)
Dept: ULTRASOUND IMAGING | Facility: CLINIC | Age: 28
End: 2018-06-04
Attending: OBSTETRICS & GYNECOLOGY
Payer: COMMERCIAL

## 2018-06-04 ENCOUNTER — PRENATAL OFFICE VISIT (OUTPATIENT)
Dept: OBGYN | Facility: CLINIC | Age: 28
End: 2018-06-04
Payer: COMMERCIAL

## 2018-06-04 VITALS
SYSTOLIC BLOOD PRESSURE: 102 MMHG | DIASTOLIC BLOOD PRESSURE: 60 MMHG | HEART RATE: 64 BPM | WEIGHT: 143 LBS | BODY MASS INDEX: 23.08 KG/M2

## 2018-06-04 DIAGNOSIS — Z34.82 ENCOUNTER FOR SUPERVISION OF OTHER NORMAL PREGNANCY IN SECOND TRIMESTER: ICD-10-CM

## 2018-06-04 DIAGNOSIS — Z34.82 ENCOUNTER FOR SUPERVISION OF OTHER NORMAL PREGNANCY IN SECOND TRIMESTER: Primary | ICD-10-CM

## 2018-06-04 PROCEDURE — 76805 OB US >/= 14 WKS SNGL FETUS: CPT | Performed by: OBSTETRICS & GYNECOLOGY

## 2018-06-04 PROCEDURE — 99207 ZZC PRENATAL VISIT: CPT | Performed by: OBSTETRICS & GYNECOLOGY

## 2018-06-04 NOTE — MR AVS SNAPSHOT
After Visit Summary   6/4/2018    Olimpia Bhakta    MRN: [id number]           Patient Information     Date Of Birth          1990        Visit Information        Provider Department      6/4/2018 7:00 PM Aubrey Houser MD [facility]        Today's Diagnoses     Encounter for supervision of other normal pregnancy in second trimester    -  1       Follow-ups after your visit        Additional Services     MAT FETAL MED CTR REFERRAL-PREGNANCY       Body mass index is 23.08 kg/(m^2).    >> Patient may proceed with recommendations for further testing as directed by the Maternal Fetal Medicine Specialist >>    >> If requesting Fetal Echo: MFM will determine appropriate location for exam due to indication.    >> If requesting Lung Maturity Amnio:  If results indicate fetal lung maturity, induction or C/S is recommended within 36 hours.  Please schedule accordingly.     Dear Patient:   Please be aware that coverage of these services is subject to the terms and limitations of your health insurance plan.  Call member services at your health plan with any benefit or coverage questions.      Please bring the following to your appointment:    >>  Any x-rays, CTs or MRIs which have been performed.  Contact the facility where they were done to arrange for  prior to your scheduled appointment.  Any new CT, MRI or other procedures ordered by your specialist must be performed at a [facility] facility or coordinated by your clinic's referral office.  >>  List of current medications   >>  This referral request   >>  Any documents/labs given to you for this referral                  Follow-up notes from your care team     Return in about 4 weeks (around 7/2/2018).      Your next 10 appointments already scheduled     Jul 02, 2018  3:45 PM CDT   ESTABLISHED PRENATAL with Aubrey Houser MD   [facility] ([facility])    [address]  [place]  [facility] [address]   [phone]            Jul 30, 2018  3:45 PM CDT   ESTABLISHED PRENATAL with Aubrey Houser MD   [facility] ([facility])    [address]  [facility] [id number] [phone]            Aug 27, 2018  6:45 PM CDT   ESTABLISHED PRENATAL with Aubrey Houser MD   [facility] ([facility])    [address]  [facility] [id number] [phone]              Who to contact     If you have questions or need follow up information about today's clinic visit or your schedule please contact [facility] directly at [phone].  Normal or non-critical lab and imaging results will be communicated to you by MyChart, letter or phone within 4 business days after the clinic has received the results. If you do not hear from us within 7 days, please contact the clinic through MyChart or phone. If you have a critical or abnormal lab result, we will notify you by phone as soon as possible.  Submit refill requests through [email or web address] or call your pharmacy and they will forward the refill request to us. Please allow 3 business days for your refill to be completed.          Additional Information About Your Visit        [employer] Information     [email or web address] gives you secure access to your electronic health record. If you see a primary care provider, you can also send messages to your care team and make appointments. If you have questions, please call your primary care clinic.  If you do not have a primary care provider, please call [phone] and they will assist you.        Care EveryWhere ID     This is your Care EveryWhere ID. This could be used by other organizations to access your [facility] medical records  [id number]        Your Vitals Were     Pulse Last Period BMI (Body Mass Index)             64 01/14/2018 (Exact Date) 23.08 kg/m2          Blood Pressure from Last 3 Encounters:    06/04/18 102/60   04/30/18 110/60   04/18/18 102/56    Weight from Last 3 Encounters:   06/04/18 143 lb (64.9 kg)   04/30/18 138 lb (62.6 kg)   04/18/18 137 lb (62.1 kg)              We Performed the Following     MAT FETAL MED CTR REFERRAL-PREGNANCY        Primary Care Provider Office Phone # Fax #    Aubrey Houser [phone]       [address] [facility]  [address]  [facility] [address]        Equal Access to Services     [facility]: Hadii aad ku hadasho Soomaali, waaxda luqadaha, qaybta kaalmada adeegyada, waxkasandra servin . So [facility] [phone].    ATENCIÓN: Si habla español, tiene a webber disposición servicios gratuitos de asistencia lingüística. [facility] [phone].    We comply with applicable federal civil rights laws and [place] laws. We do not discriminate on the basis of race, color, national origin, age, disability, sex, sexual orientation, or gender identity.            Thank you!     Thank you for choosing [facility]  for your care. Our goal is always to provide you with excellent care. Hearing back from our patients is one way we can continue to improve our services. Please take a few minutes to complete the written survey that you may receive in the mail after your visit with us. Thank you!             Your Updated Medication List - Protect others around you: Learn how to safely use, store and throw away your medicines at www.disposemymeds.org.          This list is accurate as of 6/4/18  7:29 PM.  Always use your most recent med list.                   Brand Name Dispense Instructions for use Diagnosis    CHILDRENS CHEWABLE VITAMINS PO           ondansetron 8 MG tablet    ZOFRAN    30 tablet    Take 1 tablet (8 mg) by mouth every 8 hours as needed for nausea    Hyperemesis gravidarum       ranitidine 150 MG tablet    ZANTAC    60 tablet    Take 1 tablet (150 mg) by mouth 2 times daily    Hyperemesis gravidarum       triamcinolone 0.1 %  ointment    KENALOG    80 g    Apply sparingly to affected area three times daily for 14 days.    Vaginitis and vulvovaginitis

## 2018-06-04 NOTE — NURSING NOTE
"Chief Complaint   Patient presents with     Prenatal Care     lower abdominal pain that comes and goes x's 2 days - episodes of increased heart rate     20w1d    Initial /60 (BP Location: Left arm, Patient Position: Chair, Cuff Size: Adult Regular)  Pulse 64  Wt 143 lb (64.9 kg)  LMP 01/14/2018 (Exact Date)  BMI 23.08 kg/m2 Estimated body mass index is 23.08 kg/(m^2) as calculated from the following:    Height as of 4/6/18: 5' 6\" (1.676 m).    Weight as of this encounter: 143 lb (64.9 kg).  Medication Reconciliation: complete     Nurse assisted visit.  Margaret Garcia MA.          "

## 2018-06-05 NOTE — PROGRESS NOTES
IUP at 20 1/7 weeks;     Ultrasound today; small amount of fetal ascites     Referral made to [facility]

## 2018-06-08 ENCOUNTER — HOSPITAL ENCOUNTER (OUTPATIENT)
Dept: ULTRASOUND IMAGING | Facility: CLINIC | Age: 28
Discharge: HOME | End: 2018-06-08
Attending: OBSTETRICS & GYNECOLOGY | Admitting: OBSTETRICS & GYNECOLOGY
Payer: COMMERCIAL

## 2018-06-08 ENCOUNTER — OFFICE VISIT (OUTPATIENT)
Dept: MATERNAL FETAL MEDICINE | Facility: CLINIC | Age: 28
End: 2018-06-08
Attending: OBSTETRICS & GYNECOLOGY
Payer: COMMERCIAL

## 2018-06-08 DIAGNOSIS — O26.90 PREGNANCY RELATED CONDITION, UNSPECIFIED TRIMESTER: ICD-10-CM

## 2018-06-08 DIAGNOSIS — O35.9XX0 FETAL ABNORMALITY AFFECTING MANAGEMENT OF MOTHER, ANTEPARTUM, SINGLE OR UNSPECIFIED FETUS: Primary | ICD-10-CM

## 2018-06-08 PROCEDURE — 76811 OB US DETAILED SNGL FETUS: CPT

## 2018-06-08 NOTE — PROGRESS NOTES
Please see full imaging report from ViewPoint program under imaging tab.    Thank-you for referring your patient for a targeted ultrasound due to suspected fetal ascites on outside US. I reviewed the patient's screening results.    She had cell-free DNA screening showing the expected amounts of chromosomes 21, 18 & 13 and sex chromosome material.     I discussed the normal findings on today's ultrasound with the patient and her partner. I reviewed the limitations of ultrasound. No further MFM follow up is indicated at this time based on current US findings.     Return to primary provider for continued prenatal care.    Angella Del Cid MD  Maternal Fetal Medicine

## 2018-06-08 NOTE — MR AVS SNAPSHOT
After Visit Summary   6/8/2018    Olimpia Bhakta    MRN: [id number]           Patient Information     Date Of Birth          1990        Visit Information        Provider Department      6/8/2018 12:15 PM Angella Del Cid MD [facility] Maternal Fetal Medicine [facility]        Today's Diagnoses     Fetal abnormality affecting management of mother, antepartum, single or unspecified fetus    -  1       Follow-ups after your visit        Your next 10 appointments already scheduled     Jul 02, 2018  3:45 PM CDT   ESTABLISHED PRENATAL with Aubrey Houser MD   [facility]    [address]  [facility] [address]   [phone]            Jul 30, 2018  3:45 PM CDT   ESTABLISHED PRENATAL with Aubrey Houser MD   [facility] ([facility])    [address]  [facility] [address]   [phone]            Aug 27, 2018  6:45 PM CDT   ESTABLISHED PRENATAL with Aubrey Houser MD   [facility] ([facility])    [address]  [facility] [address]   [phone]              Who to contact     If you have questions or need follow up information about today's clinic visit or your schedule please contact [facility]ERNAL FETAL [facility] directly at [phone].  Normal or non-critical lab and imaging results will be communicated to you by MyChart, letter or phone within 4 business days after the clinic has received the results. If you do not hear from us within 7 days, please contact the clinic through MyChart or phone. If you have a critical or abnormal lab result, we will notify you by phone as soon as possible.  Submit refill requests through [employer] or call your pharmacy and they will forward the refill request to us. Please allow 3 business days for your refill to be completed.          Additional Information About Your Visit        [employer]  Information     [employer] gives you secure access to your electronic health record. If you see a primary care provider, you can also send messages to your care team and make appointments. If you have questions, please call your primary care clinic.  If you do not have a primary care provider, please call [phone] and they will assist you.        Care EveryWhere ID     This is your Care EveryWhere ID. This could be used by other organizations to access your [facility] medical records  [id number]        Your Vitals Were     Last Period                   01/14/2018 (Exact Date)            Blood Pressure from Last 3 Encounters:   06/04/18 102/60   04/30/18 110/60   04/18/18 102/56    Weight from Last 3 Encounters:   06/04/18 64.9 kg (143 lb)   04/30/18 62.6 kg (138 lb)   04/18/18 62.1 kg (137 lb)              Today, you had the following     No orders found for display       Primary Care Provider Office Phone # Fax #    Aubrey Houser [phone]       [address] [place] [address]        Equal Access to Services     : Hadii aad ku hadasho Sochayali, waaxda luqadaha, qaybta kaalmada pastor, chey servin . So [facility] [phone].    ATENCIÓN: Si habla español, tiene a webber disposición servicios gratuitos de asistencia lingüística. [facility] [phone].    We comply with applicable federal civil rights laws and [place] laws. We do not discriminate on the basis of race, color, national origin, age, disability, sex, sexual orientation, or gender identity.            Thank you!     Thank you for choosing [facility]  for your care. Our goal is always to provide you with excellent care. Hearing back from our patients is one way we can continue to improve our services. Please take a few minutes to complete the written survey that you may receive in the mail after your visit with us. Thank you!             Your Updated  Medication List - Protect others around you: Learn how to safely use, store and throw away your medicines at www.disposemymeds.org.          This list is accurate as of 6/8/18  1:11 PM.  Always use your most recent med list.                   Brand Name Dispense Instructions for use Diagnosis    CHILDRENS CHEWABLE VITAMINS PO           ondansetron 8 MG tablet    ZOFRAN    30 tablet    Take 1 tablet (8 mg) by mouth every 8 hours as needed for nausea    Hyperemesis gravidarum       ranitidine 150 MG tablet    ZANTAC    60 tablet    Take 1 tablet (150 mg) by mouth 2 times daily    Hyperemesis gravidarum       triamcinolone 0.1 % ointment    KENALOG    80 g    Apply sparingly to affected area three times daily for 14 days.    Vaginitis and vulvovaginitis

## 2018-07-02 ENCOUNTER — PRENATAL OFFICE VISIT (OUTPATIENT)
Dept: OBGYN | Facility: CLINIC | Age: 28
End: 2018-07-02
Payer: COMMERCIAL

## 2018-07-02 VITALS
DIASTOLIC BLOOD PRESSURE: 68 MMHG | BODY MASS INDEX: 24.53 KG/M2 | HEART RATE: 68 BPM | SYSTOLIC BLOOD PRESSURE: 114 MMHG | WEIGHT: 152 LBS

## 2018-07-02 DIAGNOSIS — Z34.82 ENCOUNTER FOR SUPERVISION OF OTHER NORMAL PREGNANCY IN SECOND TRIMESTER: Primary | ICD-10-CM

## 2018-07-02 PROCEDURE — 99207 ZZC PRENATAL VISIT: CPT | Performed by: OBSTETRICS & GYNECOLOGY

## 2018-07-02 NOTE — NURSING NOTE
"Chief Complaint   Patient presents with     Prenatal Care     leg cramps     24w1d    Initial /68 (BP Location: Right arm, Patient Position: Chair, Cuff Size: Adult Regular)  Pulse 68  Wt 152 lb (68.9 kg)  LMP 01/14/2018 (Exact Date)  BMI 24.53 kg/m2 Estimated body mass index is 24.53 kg/(m^2) as calculated from the following:    Height as of 4/6/18: 5' 6\" (1.676 m).    Weight as of this encounter: 152 lb (68.9 kg).  Medication Reconciliation: complete     Nurse assisted visit.  Margaret Garcia MA.        "

## 2018-07-02 NOTE — MR AVS SNAPSHOT
After Visit Summary   7/2/2018    Olimpia Bhakta    MRN: [id number]           Patient Information     Date Of Birth          1990        Visit Information        Provider Department      7/2/2018 3:45 PM Aubrey Houser MD [facility]        Today's Diagnoses     Encounter for supervision of other normal pregnancy in second trimester    -  1       Follow-ups after your visit        Follow-up notes from your care team     Return in about 4 weeks (around 7/30/2018).      Your next 10 appointments already scheduled     Jul 30, 2018  3:45 PM CDT   ESTABLISHED PRENATAL with Aubrey Houser MD   [facility] ([facility])    [address]  [facility] [id number] [phone]            Aug 27, 2018  6:45 PM CDT   ESTABLISHED PRENATAL with Aubrey Houser MD   [facility] ([facility])    [address]  [facility] [id number] [phone]              Who to contact     If you have questions or need follow up information about today's clinic visit or your schedule please contact [facility] directly at [phone].  Normal or non-critical lab and imaging results will be communicated to you by MyChart, letter or phone within 4 business days after the clinic has received the results. If you do not hear from us within 7 days, please contact the clinic through MyChart or phone. If you have a critical or abnormal lab result, we will notify you by phone as soon as possible.  Submit refill requests through [employer] or call your pharmacy and they will forward the refill request to us. Please allow 3 business days for your refill to be completed.          Additional Information About Your Visit        MyChart Information     [employer] gives you secure access to your electronic health record. If you see a primary care provider, you can also send messages to your care team and make  appointments. If you have questions, please call your primary care clinic.  If you do not have a primary care provider, please call [phone] and they will assist you.        Care EveryWhere ID     This is your Care EveryWhere ID. This could be used by other organizations to access your [facility] medical records  [id number]        Your Vitals Were     Pulse Last Period BMI (Body Mass Index)             68 01/14/2018 (Exact Date) 24.53 kg/m2          Blood Pressure from Last 3 Encounters:   07/02/18 114/68   06/04/18 102/60   04/30/18 110/60    Weight from Last 3 Encounters:   07/02/18 152 lb (68.9 kg)   06/04/18 143 lb (64.9 kg)   04/30/18 138 lb (62.6 kg)              Today, you had the following     No orders found for display       Primary Care Provider Office Phone # Fax #    Aubrey Houser [phone]       [address] [facility]  [address]  [place] [address]        Equal Access to Services     [facility] : Hadii aad ku hadasho Soomaali, waaxda luqadaha, qaybta kaalmada adeegyada, waxay idiin hayemileen hemanth servin . So [facility] [phone].    ATENCIÓN: Si habla español, tiene a webber disposición servicios gratuitos de asistencia lingüística. Llame al [phone].    We comply with applicable federal civil rights laws and [place] laws. We do not discriminate on the basis of race, color, national origin, age, disability, sex, sexual orientation, or gender identity.            Thank you!     Thank you for choosing [facility]  for your care. Our goal is always to provide you with excellent care. Hearing back from our patients is one way we can continue to improve our services. Please take a few minutes to complete the written survey that you may receive in the mail after your visit with us. Thank you!             Your Updated Medication List - Protect others around you: Learn how to safely use, store and throw away your medicines at www.disposemymeds.org.          This list  is accurate as of 7/2/18  4:08 PM.  Always use your most recent med list.                   Brand Name Dispense Instructions for use Diagnosis    CHILDRENS CHEWABLE VITAMINS PO           ondansetron 8 MG tablet    ZOFRAN    30 tablet    Take 1 tablet (8 mg) by mouth every 8 hours as needed for nausea    Hyperemesis gravidarum       ranitidine 150 MG tablet    ZANTAC    60 tablet    Take 1 tablet (150 mg) by mouth 2 times daily    Hyperemesis gravidarum       triamcinolone 0.1 % ointment    KENALOG    80 g    Apply sparingly to affected area three times daily for 14 days.    Vaginitis and vulvovaginitis

## 2018-07-03 ENCOUNTER — NURSE TRIAGE (OUTPATIENT)
Dept: NURSING | Facility: CLINIC | Age: 28
End: 2018-07-03

## 2018-07-03 ENCOUNTER — HOSPITAL ENCOUNTER (OUTPATIENT)
Facility: CLINIC | Age: 28
Discharge: HOME | End: 2018-07-03
Attending: OBSTETRICS & GYNECOLOGY | Admitting: OBSTETRICS & GYNECOLOGY
Payer: COMMERCIAL

## 2018-07-03 VITALS
BODY MASS INDEX: 27.97 KG/M2 | TEMPERATURE: 98.3 F | HEART RATE: 78 BPM | RESPIRATION RATE: 18 BRPM | DIASTOLIC BLOOD PRESSURE: 65 MMHG | SYSTOLIC BLOOD PRESSURE: 113 MMHG | WEIGHT: 152 LBS | HEIGHT: 62 IN

## 2018-07-03 PROBLEM — Z36.89 ENCOUNTER FOR TRIAGE IN PREGNANT PATIENT: Status: ACTIVE | Noted: 2018-07-03

## 2018-07-03 LAB
ALBUMIN UR-MCNC: NEGATIVE MG/DL
APPEARANCE UR: CLEAR
BACTERIA #/AREA URNS HPF: ABNORMAL /HPF
BILIRUB UR QL STRIP: NEGATIVE
COLOR UR AUTO: ABNORMAL
GLUCOSE BLDC GLUCOMTR-MCNC: 104 MG/DL (ref 70–99)
GLUCOSE UR STRIP-MCNC: >499 MG/DL
HGB UR QL STRIP: NEGATIVE
KETONES UR STRIP-MCNC: NEGATIVE MG/DL
LEUKOCYTE ESTERASE UR QL STRIP: NEGATIVE
NITRATE UR QL: NEGATIVE
PH UR STRIP: 6 PH (ref 5–7)
RBC #/AREA URNS AUTO: 1 /HPF (ref 0–2)
SOURCE: ABNORMAL
SP GR UR STRIP: 1 (ref 1–1.03)
SQUAMOUS #/AREA URNS AUTO: <1 /HPF (ref 0–1)
UROBILINOGEN UR STRIP-MCNC: 0 MG/DL (ref 0–2)
WBC #/AREA URNS AUTO: <1 /HPF (ref 0–5)

## 2018-07-03 PROCEDURE — G0463 HOSPITAL OUTPT CLINIC VISIT: HCPCS

## 2018-07-03 PROCEDURE — 81001 URINALYSIS AUTO W/SCOPE: CPT | Performed by: OBSTETRICS & GYNECOLOGY

## 2018-07-03 PROCEDURE — 82962 GLUCOSE BLOOD TEST: CPT

## 2018-07-03 NOTE — IP AVS SNAPSHOT
[facility] Labor [facility] Delivery    [address]    [facility] [address]    Phone:  [phone]    Fax:  [phone]                                       After Visit Summary   7/3/2018    Olimpia Bhakta    MRN: [id number]           After Visit Summary Signature Page     I have received my discharge instructions, and my questions have been answered. I have discussed any challenges I see with this plan with the nurse or doctor.    ..........................................................................................................................................  Patient/Patient Representative Signature      ..........................................................................................................................................  Patient Representative Print Name and Relationship to Patient    ..................................................               ................................................  Date                                            Time    ..........................................................................................................................................  Reviewed by Signature/Title    ...................................................              ..............................................  Date                                                            Time

## 2018-07-03 NOTE — IP AVS SNAPSHOT
MRN:[id number]                      After Visit Summary   7/3/2018    Olimpia Bhakta    MRN: [id number]           Thank you!     Thank you for choosing [facility] for your care. Our goal is always to provide you with excellent care. Hearing back from our patients is one way we can continue to improve our services. Please take a few minutes to complete the written survey that you may receive in the mail after you visit. If you would like to speak to someone directly about your visit please contact Patient Relations at [phone]. Thank you!          Patient Information     Date Of Birth          1990        About your hospital stay     You were admitted on:  July 3, 2018 You last received care in the:  [facility] Labor and Delivery    You were discharged on:  July 3, 2018       Who to Call     For medical emergencies, please call 911.  For non-urgent questions about your medical care, please call your primary care provider or clinic, [phone]          Attending Provider     Provider Specialty    Aubrey Houser MD OB/Gyn       Primary Care Provider Office Phone # Fax #    Aubrey Houser [phone]      Your next 10 appointments already scheduled     Jul 30, 2018  3:45 PM CDT   ESTABLISHED PREN[facility] with [facility]rey [facility]    [address]  [facility] [id number] [phone]            Aug 27, 2018  6:45 PM CDT   ESTABLISHED PRENATAL with Aubrey Houser MD   [facility]    [address]  [facility] [id number] [phone]              Further instructions from your care team       Discharge Instruction for Undelivered Patients      You were seen for: Bleeding Assessment  We Consulted: Dr. Houser  You had (Test or Medicine): Urine test, fetal and uterine monitoring     Diet:   Drink 8 to 12 glasses  "of liquids (milk, juice, water) every day.  You may eat meals and snacks.     Activity:  Rest the pelvic area. No sex. Do not stimulate breasts or nipples.  Call your doctor or nurse midwife if your baby is moving less than usual.     Call your provider if you notice:  Swelling in your face or increased swelling in your hands or legs.  Headaches that are not relieved by Tylenol (acetaminophen).  Changes in your vision (blurring: seeing spots or stars.)  Nausea (sick to your stomach) and vomiting (throwing up).   Weight gain of 5 pounds or more per week.  Heartburn that doesn't go away.  Signs of bladder infection: pain when you urinate (use the toilet), need to go more often and more urgently.  The bag of cason (rupture of membranes) breaks, or you notice leaking in your underwear.  Bright red blood in your underwear.  Abdominal (lower belly) or stomach pain.  Second (plus) baby: Contractions (tightening) less than 10 minutes apart and getting stronger.  *If less than 34 weeks: Contractions (tightenings) more than 6 times in one hour.  Increase or change in vaginal discharge (note the color and amount)    Follow-up:  As scheduled in the clinic   No sexual intercourse until after seen in clinic next.          Pending Results     No orders found from 7/1/2018 to 7/4/2018.            Admission Information     Date & Time Provider Department Dept. Phone    7/3/2018 Aubrey Houser MD [facility] Labor and Delivery [phone]      Your Vitals Were     Blood Pressure Pulse Temperature Respirations Height Weight    113/65 78 98.3  F (36.8  C) (Oral) 18 1.575 m (5' 2\") 68.9 kg (152 lb)    Last Period BMI (Body Mass Index)                01/14/2018 (Exact Date) 27.8 kg/m2          MyChart Information     [employer] gives you secure access to your electronic health record. If you see a primary care provider, you can also send messages to your care team and make appointments. If you have questions, please call your " primary care clinic.  If you do not have a primary care provider, please call [phone] and they will assist you.        Care EveryWhere ID     This is your Care EveryWhere ID. This could be used by other organizations to access your [place] medical records  [id number]        Equal Access to Services     [facility] JOEY : Hadii aad ku hadguymichelle Soomaali, waaxda luqadaha, qaybta kaalmada adeegyada, chey faustinjavysocorro morgan. So [facility] [phone].    ATENCIÓN: Si habla español, tiene a webber disposición servicios gratuitos de asistencia lingüística. Tim al [phone].    We comply with applicable federal civil rights laws and [place] laws. We do not discriminate on the basis of race, color, national origin, age, disability, sex, sexual orientation, or gender identity.               Review of your medicines      UNREVIEWED medicines. Ask your doctor about these medicines        Dose / Directions    CHILDRENS CHEWABLE VITAMINS PO        Refills:  0       ondansetron 8 MG tablet   Commonly known as:  ZOFRAN   Used for:  Hyperemesis gravidarum        Dose:  8 mg   Take 1 tablet (8 mg) by mouth every 8 hours as needed for nausea   Quantity:  30 tablet   Refills:  1       triamcinolone 0.1 % ointment   Commonly known as:  KENALOG   Used for:  Vaginitis and vulvovaginitis        Apply sparingly to affected area three times daily for 14 days.   Quantity:  80 g   Refills:  1                Protect others around you: Learn how to safely use, store and throw away your medicines at www.disposemymeds.org.             Medication List: This is a list of all your medications and when to take them. Check marks below indicate your daily home schedule. Keep this list as a reference.      Medications           Morning Afternoon Evening Bedtime As Needed    CHILDRENS CHEWABLE VITAMINS PO                                ondansetron 8 MG tablet   Commonly known as:  ZOFRAN   Take 1 tablet (8 mg) by mouth every 8 hours as  needed for nausea                                triamcinolone 0.1 % ointment   Commonly known as:  KENALOG   Apply sparingly to affected area three times daily for 14 days.

## 2018-07-03 NOTE — TELEPHONE ENCOUNTER
"Patient is currently 24 2/7 weeks pregnant, DINA 10/21/18. Patient's spouse called reporting that patient just went to the bathroom and when she wiped she noticed bright red blood only on the toilet paper x1. Patient also reports that starting this morning she has had abdominal pain off and on but is now constant and rates her pain at a \"7-8\" on a 1-10 pain scale. Denies fever, blood turing toilet bowl water red, and blood on pad/underwear. Patient reports she can feel baby moving still. Reviewed guidelines below and advised L&D and that this writer will page the on-call Ob/Gyn to patient for 2nd level triage. Advised patient's  to call FNA back if they do not receive a call from the on-call OB/Gyn, Dr. Houser, within 20 minutes. Patient's  agreed with plan.    This writer paged Dr. Houser at 6:44pm via [employer] web reporting Please call Sharon Candelario (spouse) at [phone] re: Olimpia Bhakta,  86, MRN [id number], DINA 10/21/18, 24 wks, bright red spotting blood only on toilet paper x1 when wiping, moderate/severe abdominal pain, no fever    Reason for Disposition    Abdominal pain or having contractions    Additional Information    Negative: Passed out (i.e., lost consciousness, collapsed and was not responding)    Negative: Shock suspected (e.g., cold/pale/clammy skin, too weak to stand, low BP, rapid pulse)    Negative: Difficult to awaken or acting confused  (e.g., disoriented, slurred speech)    Negative: SEVERE vaginal bleeding (e.g., continuous red blood from vagina, large blood clots)    Negative: [1] SEVERE abdominal pain (e.g., excruciating) AND [2] constant AND [3] present > 1 hour    Negative: Sounds like a life-threatening emergency to the triager    Negative: [1] Vaginal bleeding AND [2] pregnant < 20 weeks    Negative: MILD-MODERATE vaginal bleeding (i.e., small to medium clots; like mild menstrual period)    Protocols used: PREGNANCY - VAGINAL BLEEDING GREATER THAN 20 WEEKS " EGA-ADULT-    Zora Maravilla RN/[facility]

## 2018-07-04 NOTE — PROVIDER NOTIFICATION
07/03/18 2025   Provider Notification   Provider Name/Title Dr. Houser   Method of Notification Phone   Request Evaluate - Remote   Notification Reason Patient Arrived;Uterine Activity;Bleeding;Lab/Diagnostic Study;Status Update;Other (Comment)   Dr. Houser notified re: Pt arrival, bleeding seen x1 on toilet paper (no bleeding since), no other s/s except mild intermittent cramping - no uterine contractions palpated or seen with toco.  FHTs in 140's, +fetal movement. UA sent, no results as of yet. No bleeding seen upon visual inspection.  Call with urine results.

## 2018-07-04 NOTE — PLAN OF CARE
Data: Patient assessed in the [facility] for vaginal bleeding.  Cervical exam not examined.  Membranes intact.  Contractions not present.  Action:  Presumed adequate fetal oxygenation documented (see flow record). Discharge instructions reviewed.  Patient instructed to report change in fetal movement, vaginal leaking of fluid or bleeding, abdominal pain, or any concerns related to the pregnancy to her nurse/physician.    Response: Orders to discharge home per Aubrey Houser.  Patient verbalized understanding of education and verbalized agreement with plan. Discharged to home at 2120.

## 2018-07-04 NOTE — DISCHARGE INSTRUCTIONS
Discharge Instruction for Undelivered Patients      You were seen for: Bleeding Assessment  We Consulted: Dr. Houser  You had (Test or Medicine): Urine test, fetal and uterine monitoring     Diet:   Drink 8 to 12 glasses of liquids (milk, juice, water) every day.  You may eat meals and snacks.     Activity:  Rest the pelvic area. No sex. Do not stimulate breasts or nipples.  Call your doctor or nurse midwife if your baby is moving less than usual.     Call your provider if you notice:  Swelling in your face or increased swelling in your hands or legs.  Headaches that are not relieved by Tylenol (acetaminophen).  Changes in your vision (blurring: seeing spots or stars.)  Nausea (sick to your stomach) and vomiting (throwing up).   Weight gain of 5 pounds or more per week.  Heartburn that doesn't go away.  Signs of bladder infection: pain when you urinate (use the toilet), need to go more often and more urgently.  The bag of cason (rupture of membranes) breaks, or you notice leaking in your underwear.  Bright red blood in your underwear.  Abdominal (lower belly) or stomach pain.  Second (plus) baby: Contractions (tightening) less than 10 minutes apart and getting stronger.  *If less than 34 weeks: Contractions (tightenings) more than 6 times in one hour.  Increase or change in vaginal discharge (note the color and amount)    Follow-up:  As scheduled in the clinic   No sexual intercourse until after seen in clinic next.

## 2018-07-04 NOTE — PLAN OF CARE
Data: Patient presented to [facility]: 7/3/2018  7:35 PM.  Reason for maternal/fetal assessment is vaginal bleeding. Patient reports seeing small amount of blood on toilet paper after urinating.  No bleeding seen since.  Pt has felt mild intermittent uterine cramping for past few hours. No other complaints.  Patient is a .  Prenatal record reviewed. Pregnancy  has been complicated by hyperemesis gravidarum (now resolved).  Gestational Age 24w2d. VSS. Fetal movement present. Patient denies uterine contractions, leaking of vaginal fluid/rupture of membranes, abdominal pain, pelvic pressure, nausea, vomiting, headache, visual disturbances, epigastric or URQ pain, significant edema. Support person is present.   Action: Verbal consent for EFM. Triage assessment completed. Bill of rights reviewed.  Response: Patient verbalized agreement with plan. Will contact Dr Aubrey Houser with update and further orders.

## 2018-07-30 ENCOUNTER — PRENATAL OFFICE VISIT (OUTPATIENT)
Dept: OBGYN | Facility: CLINIC | Age: 28
End: 2018-07-30
Payer: COMMERCIAL

## 2018-07-30 VITALS
HEART RATE: 72 BPM | SYSTOLIC BLOOD PRESSURE: 110 MMHG | BODY MASS INDEX: 28.35 KG/M2 | DIASTOLIC BLOOD PRESSURE: 62 MMHG | WEIGHT: 155 LBS

## 2018-07-30 DIAGNOSIS — D64.9 ANEMIA: ICD-10-CM

## 2018-07-30 DIAGNOSIS — Z34.83 ENCOUNTER FOR SUPERVISION OF OTHER NORMAL PREGNANCY IN THIRD TRIMESTER: Primary | ICD-10-CM

## 2018-07-30 PROBLEM — O21.0 HYPEREMESIS GRAVIDARUM: Status: RESOLVED | Noted: 2018-02-26 | Resolved: 2018-07-30

## 2018-07-30 PROBLEM — Z36.89 ENCOUNTER FOR TRIAGE IN PREGNANT PATIENT: Status: RESOLVED | Noted: 2018-07-03 | Resolved: 2018-07-30

## 2018-07-30 LAB
ERYTHROCYTE [DISTWIDTH] IN BLOOD BY AUTOMATED COUNT: 13.5 % (ref 10–15)
HCT VFR BLD AUTO: 31.8 % (ref 35–47)
HGB BLD-MCNC: 10.5 G/DL (ref 11.7–15.7)
MCH RBC QN AUTO: 31.2 PG (ref 26.5–33)
MCHC RBC AUTO-ENTMCNC: 33 G/DL (ref 31.5–36.5)
MCV RBC AUTO: 94 FL (ref 78–100)
PLATELET # BLD AUTO: 168 10E9/L (ref 150–450)
RBC # BLD AUTO: 3.37 10E12/L (ref 3.8–5.2)
WBC # BLD AUTO: 9.2 10E9/L (ref 4–11)

## 2018-07-30 PROCEDURE — 82950 GLUCOSE TEST: CPT | Performed by: OBSTETRICS & GYNECOLOGY

## 2018-07-30 PROCEDURE — 99207 ZZC PRENATAL VISIT: CPT | Performed by: OBSTETRICS & GYNECOLOGY

## 2018-07-30 PROCEDURE — 86780 TREPONEMA PALLIDUM: CPT | Performed by: OBSTETRICS & GYNECOLOGY

## 2018-07-30 PROCEDURE — 85027 COMPLETE CBC AUTOMATED: CPT | Performed by: OBSTETRICS & GYNECOLOGY

## 2018-07-30 PROCEDURE — 36415 COLL VENOUS BLD VENIPUNCTURE: CPT | Performed by: OBSTETRICS & GYNECOLOGY

## 2018-07-30 RX ORDER — PRENATAL VIT/IRON FUM/FOLIC AC 27MG-0.8MG
1 TABLET ORAL DAILY
Qty: 100 TABLET | Refills: 3 | Status: ON HOLD | OUTPATIENT
Start: 2018-07-30 | End: 2018-10-13

## 2018-07-30 NOTE — NURSING NOTE
"Chief Complaint   Patient presents with     Prenatal Care     1 hour glucose - varicose veins - dizziness - dentist     28w1d    Initial /62 (BP Location: Left arm, Patient Position: Chair, Cuff Size: Adult Regular)  Pulse 72  Wt 155 lb (70.3 kg)  LMP 01/14/2018 (Exact Date)  BMI 28.35 kg/m2 Estimated body mass index is 28.35 kg/(m^2) as calculated from the following:    Height as of 7/3/18: 5' 2\" (1.575 m).    Weight as of this encounter: 155 lb (70.3 kg).  Medication Reconciliation: complete     Nurse assisted visit.  Margaret Garcia MA.        "

## 2018-07-30 NOTE — PROGRESS NOTES
IUP at 28 1/7 weeks;     -glucose today     -superficial venous varicosities of L leg and L labia majora     -symptomatic therapy

## 2018-07-30 NOTE — MR AVS SNAPSHOT
After Visit Summary   7/30/2018    Olimpia Bhakta    MRN: [id number]           Patient Information     Date Of Birth          1990        Visit Information        Provider Department      7/30/2018 3:45 PM Aubrey Houser MD [facility]        Today's Diagnoses     Encounter for supervision of other normal pregnancy in third trimester    -  1       Follow-ups after your visit        Follow-up notes from your care team     Return in about 4 weeks (around 8/27/2018).      Your next 10 appointments already scheduled     Aug 27, 2018  6:45 PM CDT   ESTABLISHED PRENATAL with Aubrey Houser MD   [facility] ([facility])    [address]  [facility] [address] [phone]              Who to contact     If you have questions or need follow up information about today's clinic visit or your schedule please contact [facility] directly at [phone].  Normal or non-critical lab and imaging results will be communicated to you by Ad[employer]hart, letter or phone within 4 business days after the clinic has received the results. If you do not hear from us within 7 days, please contact the clinic through [employer] or phone. If you have a critical or abnormal lab result, we will notify you by phone as soon as possible.  Submit refill requests through [employer] or call your pharmacy and they will forward the refill request to us. Please allow 3 business days for your refill to be completed.          Additional Information About Your Visit        MyChart Information     [employer] gives you secure access to your electronic health record. If you see a primary care provider, you can also send messages to your care team and make appointments. If you have questions, please call your primary care clinic.  If you do not have a primary care provider, please call [phone] and they will assist you.        Care EveryWhere ID     This is your  Care EveryWhere ID. This could be used by other organizations to access your [place] medical records  [id number]        Your Vitals Were     Pulse Last Period BMI (Body Mass Index)             72 01/14/2018 (Exact Date) 28.35 kg/m2          Blood Pressure from Last 3 Encounters:   07/30/18 110/62   07/03/18 113/65   07/02/18 114/68    Weight from Last 3 Encounters:   07/30/18 155 lb (70.3 kg)   07/03/18 152 lb (68.9 kg)   07/02/18 152 lb (68.9 kg)              We Performed the Following     CBC with platelets     Glucose tolerance, gest screen, 1 hour     Treponema Abs w Reflex to RPR and Titer          Today's Medication Changes          These changes are accurate as of 7/30/18  4:16 PM.  If you have any questions, ask your nurse or doctor.               Start taking these medicines.        Dose/Directions    prenatal multivitamin plus iron 27-0.8 MG Tabs per tablet   Used for:  Encounter for supervision of other normal pregnancy in third trimester   Started by:  Aubrey Houser MD        Dose:  1 tablet   Take 1 tablet by mouth daily   Quantity:  100 tablet   Refills:  3            Where to get your medicines      These medications were sent to [facility] [address] [place] [address]     Phone:  [phone]     prenatal multivitamin plus iron 27-0.8 MG Tabs per tablet                Primary Care Provider Office Phone # Fax #    Aubrey Houser [phone]       [facility] [address] [place] [address]        Equal Access to Services     [facility] AH: Hadii aad ku hadasho Soomaali, waaxda luqadaha, qaybta kaalmada adeegyada, chey morgan. So [facility] [phone].    ATENCIÓN: Si habla español, tiene a webber disposición servicios gratuitos de asistencia lingüística. Llame al [phone].    We comply with applicable federal civil rights laws and [place]  laws. We do not discriminate on the basis of race, color, national origin, age, disability, sex, sexual orientation, or gender identity.            Thank you!     Thank you for choosing [facility]  for your care. Our goal is always to provide you with excellent care. Hearing back from our patients is one way we can continue to improve our services. Please take a few minutes to complete the written survey that you may receive in the mail after your visit with us. Thank you!             Your Updated Medication List - Protect others around you: Learn how to safely use, store and throw away your medicines at www.disposemymeds.org.          This list is accurate as of 7/30/18  4:16 PM.  Always use your most recent med list.                   Brand Name Dispense Instructions for use Diagnosis    CHILDRENS CHEWABLE VITAMINS PO           ondansetron 8 MG tablet    ZOFRAN    30 tablet    Take 1 tablet (8 mg) by mouth every 8 hours as needed for nausea    Hyperemesis gravidarum       prenatal multivitamin plus iron 27-0.8 MG Tabs per tablet     100 tablet    Take 1 tablet by mouth daily    Encounter for supervision of other normal pregnancy in third trimester       ranitidine 150 MG tablet    ZANTAC    60 tablet    Take 1 tablet (150 mg) by mouth 2 times daily        triamcinolone 0.1 % ointment    KENALOG    80 g    Apply sparingly to affected area three times daily for 14 days.    Vaginitis and vulvovaginitis

## 2018-07-31 ENCOUNTER — TELEPHONE (OUTPATIENT)
Dept: OBGYN | Facility: CLINIC | Age: 28
End: 2018-07-31

## 2018-07-31 LAB — GLUCOSE 1H P 50 G GLC PO SERPL-MCNC: 110 MG/DL (ref 60–129)

## 2018-07-31 NOTE — TELEPHONE ENCOUNTER
calls to mention pt forgot to ask for letter at yesterdays appt.    28w2d  Pt has swelling in left foot and ankle due to pregnancy.  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  Her work informed her that if she gets a letter from her doctor she can wear alternative foot wear. I did ask that she not wear foot wear that would compromise her safety at work.    Letter started, please edit as needed, they want to  in [place].    Wanda ALLAN R.N.  [facility]

## 2018-07-31 NOTE — TELEPHONE ENCOUNTER
Letter in [place] and will bring to [facility] for tomorrow for pt to .  Pt notified that letter complete.  Margaret Garcia ma

## 2018-07-31 NOTE — LETTER
[facility]  [address], [address]  [place] [address]  [phone]        July 31, 2018    Olimpia Rivas Yony  [address] [facility] [address]            To Whom It May Concern:      Olimpia is a patient that is under my care.  Due to a temporary medical condition it is hard for her to wear the recommended boots at work.  Please allow for her to wear a safe alternative form of foot wear to accommodate this condition for the next 13 weeks.  If you have any further questions or problems, please contact our office.    Sincerely,      Aubrey Houser M.D.

## 2018-08-01 LAB — T PALLIDUM AB SER QL: NONREACTIVE

## 2018-08-01 RX ORDER — FERROUS SULFATE 325(65) MG
325 TABLET ORAL
Qty: 90 TABLET | Refills: 2 | Status: SHIPPED | OUTPATIENT
Start: 2018-08-01 | End: 2020-08-05

## 2018-08-01 NOTE — TELEPHONE ENCOUNTER
was called back and informed that letter is ready to be picked up in [place] [facility] department.   Nik Ag CMA

## 2018-08-13 ENCOUNTER — TELEPHONE (OUTPATIENT)
Dept: OBGYN | Facility: CLINIC | Age: 28
End: 2018-08-13

## 2018-08-13 ENCOUNTER — PRENATAL OFFICE VISIT (OUTPATIENT)
Dept: OBGYN | Facility: CLINIC | Age: 28
End: 2018-08-13
Payer: COMMERCIAL

## 2018-08-13 VITALS
DIASTOLIC BLOOD PRESSURE: 62 MMHG | BODY MASS INDEX: 28.72 KG/M2 | SYSTOLIC BLOOD PRESSURE: 116 MMHG | HEART RATE: 76 BPM | WEIGHT: 157 LBS

## 2018-08-13 DIAGNOSIS — Z34.83 ENCOUNTER FOR SUPERVISION OF OTHER NORMAL PREGNANCY IN THIRD TRIMESTER: Primary | ICD-10-CM

## 2018-08-13 PROCEDURE — 99207 ZZC PRENATAL VISIT: CPT | Performed by: OBSTETRICS & GYNECOLOGY

## 2018-08-13 NOTE — MR AVS SNAPSHOT
After Visit Summary   8/13/2018    Olimpia Bhakta    MRN: [id number]           Patient Information     Date Of Birth          1990        Visit Information        Provider Department      8/13/2018 6:45 PM Aubrey Houser MD [facility]        Today's Diagnoses     Encounter for supervision of other normal pregnancy in third trimester    -  1       Follow-ups after your visit        Follow-up notes from your care team     Return in about 1 week (around 8/20/2018).      Your next 10 appointments already scheduled     Aug 27, 2018  6:45 PM CDT   ESTABLISHED PRENATAL with Aubrey Houser MD   [facility] ([facility])    [address]  [address] [place] [id number] [phone]            Sep 10, 2018  6:45 PM CDT   ESTABLISHED PRENATAL with Aubrey Houser MD   [facility] ([facility])    [address]  [address]  [facility] [id number] [phone]            Sep 24, 2018  6:45 PM CDT   ESTABLISHED PRENATAL with Aubrey Houser MD   [facility] ([facility])    [address]  [address] [place] [id number] [phone]              Who to contact     If you have questions or need follow up information about today's clinic visit or your schedule please contact [facility] directly at [phone].  Normal or non-critical lab and imaging results will be communicated to you by MyChart, letter or phone within 4 business days after the clinic has received the results. If you do not hear from us within 7 days, please contact the clinic through MyChart or phone. If you have a critical or abnormal lab result, we will notify you by phone as soon as possible.  Submit refill requests through [employer] or call your pharmacy and they will forward the refill request to us. Please allow 3 business days for your refill to be  completed.          Additional Information About Your Visit        MyChart Information     [employer] gives you secure access to your electronic health record. If you see a primary care provider, you can also send messages to your care team and make appointments. If you have questions, please call your primary care clinic.  If you do not have a primary care provider, please call [phone] and they will assist you.        Care EveryWhere ID     This is your Care EveryWhere ID. This could be used by other organizations to access your [place] medical records  [id number]        Your Vitals Were     Pulse Last Period BMI (Body Mass Index)             76 01/14/2018 (Exact Date) 28.72 kg/m2          Blood Pressure from Last 3 Encounters:   08/13/18 116/62   07/30/18 110/62   07/03/18 113/65    Weight from Last 3 Encounters:   08/13/18 157 lb (71.2 kg)   07/30/18 155 lb (70.3 kg)   07/03/18 152 lb (68.9 kg)              Today, you had the following     No orders found for display       Primary Care Provider Office Phone # Fax #    Aubrey Houser [phone]       [address] [facility]  [address]  [place] [id number]        Equal Access to Services     DARRON COOK AH: Hadii meggan ku hadasho Soomaali, waaxda luqadaha, qaybta kaalmada adeegyada, chey morgan. So [facility] [phone].    ATENCIÓN: Si habla español, tiene a webber disposición servicios gratuitos de asistencia lingüística. Llame al [phone].    We comply with applicable federal civil rights laws and [place] laws. We do not discriminate on the basis of race, color, national origin, age, disability, sex, sexual orientation, or gender identity.            Thank you!     Thank you for choosing [facility]  for your care. Our goal is always to provide you with excellent care. Hearing back from our patients is one way we can continue to improve our services. Please take a few minutes to complete the written  survey that you may receive in the mail after your visit with us. Thank you!             Your Updated Medication List - Protect others around you: Learn how to safely use, store and throw away your medicines at www.disposemymeds.org.          This list is accurate as of 8/13/18 11:59 PM.  Always use your most recent med list.                   Brand Name Dispense Instructions for use Diagnosis    CHILDRENS CHEWABLE VITAMINS PO           ferrous sulfate 325 (65 Fe) MG tablet    IRON    90 tablet    Take 1 tablet (325 mg) by mouth daily (with breakfast)    Encounter for supervision of other normal pregnancy in third trimester, Anemia       ondansetron 8 MG tablet    ZOFRAN    30 tablet    Take 1 tablet (8 mg) by mouth every 8 hours as needed for nausea    Hyperemesis gravidarum       prenatal multivitamin plus iron 27-0.8 MG Tabs per tablet     100 tablet    Take 1 tablet by mouth daily    Encounter for supervision of other normal pregnancy in third trimester       ranitidine 150 MG tablet    ZANTAC    60 tablet    Take 1 tablet (150 mg) by mouth 2 times daily        triamcinolone 0.1 % ointment    KENALOG    80 g    Apply sparingly to affected area three times daily for 14 days.    Vaginitis and vulvovaginitis

## 2018-08-13 NOTE — NURSING NOTE
"Chief Complaint   Patient presents with     Prenatal Care     baby very active - cough and runny nose x's 3 days - denies fevers     30w1d    Initial /62 (BP Location: Right arm, Patient Position: Chair, Cuff Size: Adult Regular)  Pulse 76  Wt 157 lb (71.2 kg)  LMP 01/14/2018 (Exact Date)  BMI 28.72 kg/m2 Estimated body mass index is 28.72 kg/(m^2) as calculated from the following:    Height as of 7/3/18: 5' 2\" (1.575 m).    Weight as of this encounter: 157 lb (71.2 kg).  Medication Reconciliation: complete     Nurse assisted visit.  Margaret Garcia MA.        "

## 2018-08-27 ENCOUNTER — PRENATAL OFFICE VISIT (OUTPATIENT)
Dept: OBGYN | Facility: CLINIC | Age: 28
End: 2018-08-27
Payer: COMMERCIAL

## 2018-08-27 VITALS
WEIGHT: 160 LBS | BODY MASS INDEX: 29.26 KG/M2 | DIASTOLIC BLOOD PRESSURE: 66 MMHG | HEART RATE: 80 BPM | SYSTOLIC BLOOD PRESSURE: 114 MMHG

## 2018-08-27 DIAGNOSIS — Z34.83 ENCOUNTER FOR SUPERVISION OF OTHER NORMAL PREGNANCY IN THIRD TRIMESTER: Primary | ICD-10-CM

## 2018-08-27 PROCEDURE — 99207 ZZC PRENATAL VISIT: CPT | Performed by: OBSTETRICS & GYNECOLOGY

## 2018-08-27 RX ORDER — BREAST PUMP
1 EACH MISCELLANEOUS DAILY
Qty: 1 EACH | Refills: 1 | Status: SHIPPED | OUTPATIENT
Start: 2018-08-27 | End: 2020-08-05

## 2018-08-27 NOTE — MR AVS SNAPSHOT
After Visit Summary   8/27/2018    Olimpia Bhakta    MRN: [id number]           Patient Information     Date Of Birth          1990        Visit Information        Provider Department      8/27/2018 6:45 PM Aubrey Houser MD [facility]        Today's Diagnoses     Encounter for supervision of other normal pregnancy in third trimester    -  1       Follow-ups after your visit        Follow-up notes from your care team     Return in about 2 weeks (around 9/10/2018).      Your next 10 appointments already scheduled     Sep 10, 2018  6:45 PM CDT   ESTABLISHED PRENATAL with Aubrey Houser MD   [facility] ([facility])    [address]  [address]  [facility] [id number] [phone]            Sep 24, 2018  6:45 PM CDT   ESTABLISHED PRENATAL with Aubrey Houser MD   [facility] ([facility])    [address]  [address]  [facility] [id number] [phone]              Who to contact     If you have questions or need follow up information about today's clinic visit or your schedule please contact [facility] directly at [phone].  Normal or non-critical lab and imaging results will be communicated to you by MyChart, letter or phone within 4 business days after the clinic has received the results. If you do not hear from us within 7 days, please contact the clinic through [employer] or phone. If you have a critical or abnormal lab result, we will notify you by phone as soon as possible.  Submit refill requests through [facility] or call your pharmacy and they will forward the refill request to us. Please allow 3 business days for your refill to be completed.          Additional Information About Your Visit        [employer] Information     [facility] gives you secure access to your electronic health record. If you see a primary care provider, you can also send messages to  your care team and make appointments. If you have questions, please call your primary care clinic.  If you do not have a primary care provider, please call [phone] and they will assist you.        Care EveryWhere ID     This is your Care EveryWhere ID. This could be used by other organizations to access your [place] medical records  [id number]        Your Vitals Were     Pulse Last Period BMI (Body Mass Index)             80 01/14/2018 (Exact Date) 29.26 kg/m2          Blood Pressure from Last 3 Encounters:   08/27/18 114/66   08/13/18 116/62   07/30/18 110/62    Weight from Last 3 Encounters:   08/27/18 160 lb (72.6 kg)   08/13/18 157 lb (71.2 kg)   07/30/18 155 lb (70.3 kg)              Today, you had the following     No orders found for display         Today's Medication Changes          These changes are accurate as of 8/27/18  7:04 PM.  If you have any questions, ask your nurse or doctor.               Start taking these medicines.        Dose/Directions    breast pump Misc   Used for:  Encounter for supervision of other normal pregnancy in third trimester   Started by:  Aubrey Houser MD        Dose:  1 each   1 each daily   Quantity:  1 each   Refills:  1            Where to get your medicines      Some of these will need a paper prescription and others can be bought over the counter.  Ask your nurse if you have questions.     Bring a paper prescription for each of these medications     breast pump Misc                Primary Care Provider Office Phone # Fax #    Aubrey Houser [phone]       [address] [facility]  [address]  [facility] [address]        Equal Access to Services     [facility] AH: Hadii aad ku hadasho Soomaali, waaxda luqadaha, qaybta kaalmada chey baumann . So [facility] [phone].    ATENCIÓN: Si habla español, tiene a webber disposición servicios gratuitos de asistencia lingüística. Llame al [phone].    We comply with  applicable federal civil rights laws and [place] laws. We do not discriminate on the basis of race, color, national origin, age, disability, sex, sexual orientation, or gender identity.            Thank you!     Thank you for choosing [facility]  for your care. Our goal is always to provide you with excellent care. Hearing back from our patients is one way we can continue to improve our services. Please take a few minutes to complete the written survey that you may receive in the mail after your visit with us. Thank you!             Your Updated Medication List - Protect others around you: Learn how to safely use, store and throw away your medicines at www.disposemymeds.org.          This list is accurate as of 8/27/18  7:04 PM.  Always use your most recent med list.                   Brand Name Dispense Instructions for use Diagnosis    breast pump Misc     1 each    1 each daily    Encounter for supervision of other normal pregnancy in third trimester       CHILDRENS CHEWABLE VITAMINS PO           ferrous sulfate 325 (65 Fe) MG tablet    IRON    90 tablet    Take 1 tablet (325 mg) by mouth daily (with breakfast)    Encounter for supervision of other normal pregnancy in third trimester, Anemia       ondansetron 8 MG tablet    ZOFRAN    30 tablet    Take 1 tablet (8 mg) by mouth every 8 hours as needed for nausea    Hyperemesis gravidarum       prenatal multivitamin plus iron 27-0.8 MG Tabs per tablet     100 tablet    Take 1 tablet by mouth daily    Encounter for supervision of other normal pregnancy in third trimester       ranitidine 150 MG tablet    ZANTAC    60 tablet    Take 1 tablet (150 mg) by mouth 2 times daily        triamcinolone 0.1 % ointment    KENALOG    80 g    Apply sparingly to affected area three times daily for 14 days.    Vaginitis and vulvovaginitis

## 2018-08-27 NOTE — NURSING NOTE
"Chief Complaint   Patient presents with     Prenatal Care     baby active and moving     32w1d    Initial /66 (BP Location: Right arm, Patient Position: Chair, Cuff Size: Adult Regular)  Pulse 80  Wt 160 lb (72.6 kg)  LMP 01/14/2018 (Exact Date)  BMI 29.26 kg/m2 Estimated body mass index is 29.26 kg/(m^2) as calculated from the following:    Height as of 7/3/18: 5' 2\" (1.575 m).    Weight as of this encounter: 160 lb (72.6 kg).  Medication Reconciliation: complete     Nurse assisted visit.  Margaret Garcia MA.        "

## 2018-09-10 ENCOUNTER — PRENATAL OFFICE VISIT (OUTPATIENT)
Dept: OBGYN | Facility: CLINIC | Age: 28
End: 2018-09-10
Payer: COMMERCIAL

## 2018-09-10 VITALS
SYSTOLIC BLOOD PRESSURE: 110 MMHG | DIASTOLIC BLOOD PRESSURE: 66 MMHG | BODY MASS INDEX: 29.63 KG/M2 | WEIGHT: 162 LBS | HEART RATE: 72 BPM

## 2018-09-10 DIAGNOSIS — Z34.83 ENCOUNTER FOR SUPERVISION OF OTHER NORMAL PREGNANCY IN THIRD TRIMESTER: Primary | ICD-10-CM

## 2018-09-10 PROCEDURE — 99207 ZZC PRENATAL VISIT: CPT | Performed by: OBSTETRICS & GYNECOLOGY

## 2018-09-10 NOTE — NURSING NOTE
"Chief Complaint   Patient presents with     Prenatal Care     baby active and moving     34w1d    Initial /66 (BP Location: Right arm, Patient Position: Chair, Cuff Size: Adult Regular)  Pulse 72  Wt 162 lb (73.5 kg)  LMP 01/14/2018 (Exact Date)  BMI 29.63 kg/m2 Estimated body mass index is 29.63 kg/(m^2) as calculated from the following:    Height as of 7/3/18: 5' 2\" (1.575 m).    Weight as of this encounter: 162 lb (73.5 kg).  Medication Reconciliation: complete     Nurse assisted visit.  Margaret Garcia MA.      "

## 2018-09-10 NOTE — MR AVS SNAPSHOT
After Visit Summary   9/10/2018    Olimpia Bhakta    MRN: [id number]           Patient Information     Date Of Birth          1990        Visit Information        Provider Department      9/10/2018 6:45 PM Aubrey Houser MD [facility]        Today's Diagnoses     Encounter for supervision of other normal pregnancy in third trimester    -  1       Follow-ups after your visit        Follow-up notes from your care team     Return in about 2 weeks (around 9/24/2018).      Your next 10 appointments already scheduled     Sep 24, 2018  6:45 PM CDT   ESTABLISHED PRENATAL with Aubrey Houser MD   [facility] ([facility])    [address]  [address]  [facility] [address] [phone]              Who to contact     If you have questions or need follow up information about today's clinic visit or your schedule please contact [facility] directly at [phone].  Normal or non-critical lab and imaging results will be communicated to you by [email or web address], letter or phone within 4 business days after the clinic has received the results. If you do not hear from us within 7 days, please contact the clinic through [email or web address] or phone. If you have a critical or abnormal lab result, we will notify you by phone as soon as possible.  Submit refill requests through [employer] or call your pharmacy and they will forward the refill request to us. Please allow 3 business days for your refill to be completed.          Additional Information About Your Visit        MyChart Information     [employer] gives you secure access to your electronic health record. If you see a primary care provider, you can also send messages to your care team and make appointments. If you have questions, please call your primary care clinic.  If you do not have a primary care provider, please call [phone] and they will assist you.        Care EveryWhere ID      This is your Care EveryWhere ID. This could be used by other organizations to access your [place] medical records  [id number]        Your Vitals Were     Pulse Last Period BMI (Body Mass Index)             72 01/14/2018 (Exact Date) 29.63 kg/m2          Blood Pressure from Last 3 Encounters:   09/10/18 110/66   08/27/18 114/66   08/13/18 116/62    Weight from Last 3 Encounters:   09/10/18 162 lb (73.5 kg)   08/27/18 160 lb (72.6 kg)   08/13/18 157 lb (71.2 kg)              Today, you had the following     No orders found for display       Primary Care Provider Office Phone # Fax #    Aubrey Houser [phone]       [address]  [address] [place] [phone]        Equal Access to Services     DARRON COOK : Hadii aad flex hadasho Sosamantha, waaxda luqadaha, qaybta kaalmada adeegyada, chey servin . So [facility] [phone].    ATENCIÓN: Si habla español, tiene a webber disposición servicios gratuitos de asistencia lingüística. Tim al [phone].    We comply with applicable federal civil rights laws and [place] laws. We do not discriminate on the basis of race, color, national origin, age, disability, sex, sexual orientation, or gender identity.            Thank you!     Thank you for choosing [facility]  for your care. Our goal is always to provide you with excellent care. Hearing back from our patients is one way we can continue to improve our services. Please take a few minutes to complete the written survey that you may receive in the mail after your visit with us. Thank you!             Your Updated Medication List - Protect others around you: Learn how to safely use, store and throw away your medicines at www.disposemymeds.org.          This list is accurate as of 9/10/18  6:50 PM.  Always use your most recent med list.                   Brand Name Dispense Instructions for use Diagnosis    breast pump Misc     1 each    1 each daily     Encounter for supervision of other normal pregnancy in third trimester       CHILDRENS CHEWABLE VITAMINS PO           ferrous sulfate 325 (65 Fe) MG tablet    IRON    90 tablet    Take 1 tablet (325 mg) by mouth daily (with breakfast)    Encounter for supervision of other normal pregnancy in third trimester, Anemia       ondansetron 8 MG tablet    ZOFRAN    30 tablet    Take 1 tablet (8 mg) by mouth every 8 hours as needed for nausea    Hyperemesis gravidarum       prenatal multivitamin plus iron 27-0.8 MG Tabs per tablet     100 tablet    Take 1 tablet by mouth daily    Encounter for supervision of other normal pregnancy in third trimester       triamcinolone 0.1 % ointment    KENALOG    80 g    Apply sparingly to affected area three times daily for 14 days.    Vaginitis and vulvovaginitis

## 2018-09-16 ENCOUNTER — NURSE TRIAGE (OUTPATIENT)
Dept: NURSING | Facility: CLINIC | Age: 28
End: 2018-09-16

## 2018-09-16 ENCOUNTER — APPOINTMENT (OUTPATIENT)
Dept: ULTRASOUND IMAGING | Facility: CLINIC | Age: 28
End: 2018-09-16
Attending: OBSTETRICS & GYNECOLOGY
Payer: COMMERCIAL

## 2018-09-16 ENCOUNTER — HOSPITAL ENCOUNTER (OUTPATIENT)
Facility: CLINIC | Age: 28
Discharge: HOME | End: 2018-09-16
Attending: OBSTETRICS & GYNECOLOGY | Admitting: OBSTETRICS & GYNECOLOGY
Payer: COMMERCIAL

## 2018-09-16 VITALS — RESPIRATION RATE: 18 BRPM | TEMPERATURE: 98.3 F | DIASTOLIC BLOOD PRESSURE: 72 MMHG | SYSTOLIC BLOOD PRESSURE: 124 MMHG

## 2018-09-16 PROBLEM — Z36.89 ENCOUNTER FOR TRIAGE IN PREGNANT PATIENT: Status: ACTIVE | Noted: 2018-09-16

## 2018-09-16 LAB
ALBUMIN UR-MCNC: 30 MG/DL
APPEARANCE UR: ABNORMAL
BACTERIA #/AREA URNS HPF: ABNORMAL /HPF
BILIRUB UR QL STRIP: NEGATIVE
COLOR UR AUTO: YELLOW
GLUCOSE BLDC GLUCOMTR-MCNC: 92 MG/DL (ref 70–99)
GLUCOSE UR STRIP-MCNC: >499 MG/DL
HGB UR QL STRIP: ABNORMAL
KETONES UR STRIP-MCNC: NEGATIVE MG/DL
LEUKOCYTE ESTERASE UR QL STRIP: ABNORMAL
MUCOUS THREADS #/AREA URNS LPF: PRESENT /LPF
NITRATE UR QL: NEGATIVE
PH UR STRIP: 6 PH (ref 5–7)
RBC #/AREA URNS AUTO: 8 /HPF (ref 0–2)
SOURCE: ABNORMAL
SP GR UR STRIP: 1.02 (ref 1–1.03)
SQUAMOUS #/AREA URNS AUTO: 2 /HPF (ref 0–1)
UROBILINOGEN UR STRIP-MCNC: 0 MG/DL (ref 0–2)
WBC #/AREA URNS AUTO: 33 /HPF (ref 0–5)
YEAST #/AREA URNS HPF: ABNORMAL /HPF

## 2018-09-16 PROCEDURE — 87086 URINE CULTURE/COLONY COUNT: CPT | Performed by: OBSTETRICS & GYNECOLOGY

## 2018-09-16 PROCEDURE — 81001 URINALYSIS AUTO W/SCOPE: CPT | Performed by: OBSTETRICS & GYNECOLOGY

## 2018-09-16 PROCEDURE — 76770 US EXAM ABDO BACK WALL COMP: CPT

## 2018-09-16 PROCEDURE — G0463 HOSPITAL OUTPT CLINIC VISIT: HCPCS | Mod: 25

## 2018-09-16 PROCEDURE — 99213 OFFICE O/P EST LOW 20 MIN: CPT | Performed by: OBSTETRICS & GYNECOLOGY

## 2018-09-16 PROCEDURE — 25000132 ZZH RX MED GY IP 250 OP 250 PS 637: Performed by: OBSTETRICS & GYNECOLOGY

## 2018-09-16 PROCEDURE — 82962 GLUCOSE BLOOD TEST: CPT

## 2018-09-16 RX ORDER — ACETAMINOPHEN 325 MG/1
650 TABLET ORAL ONCE
Status: COMPLETED | OUTPATIENT
Start: 2018-09-16 | End: 2018-09-16

## 2018-09-16 RX ADMIN — ACETAMINOPHEN 650 MG: 325 TABLET, FILM COATED ORAL at 16:09

## 2018-09-16 NOTE — PLAN OF CARE
Data: Patient assessed in the [facility] for left sided abdominal pain.  Cervical exam closed.  Membranes intact.  Contractions occasional, mild.  Action:  Presumed adequate fetal oxygenation documented (see flow record). Discharge instructions reviewed.  Patient instructed to report change in fetal movement, vaginal leaking of fluid or bleeding, abdominal pain, or any concerns related to the pregnancy to her nurse/physician.    Response: Orders to discharge home per Bishop Scott.  Patient verbalized understanding of education and verbalized agreement with plan. Discharged to home at 1715.

## 2018-09-16 NOTE — H&P
[facility] Labor and Delivery History and Physical    Olimpia Bhakta MRN# [id number]   Age: 28 year old YOB: 1990     Date of Admission:  2018    Primary care provider: Aubrey Houser           Chief Complaint:   Olimpia Bhakta is a 28 year old black female  Estimated Date of Delivery: Oct 21, 2018  who is 35w0d pregnant and being seen in L&D for evaluation of L flank pain that began last pm.  The pt states it came on without known ppt'g event and is not assoc with ctx's, ROM, vaginal bleeding,  F/C, N/V, changes in bowel function, hematuria or dysuria or a hx of nephrolithiasis.  She denies a past hx of similar sx's.  no other family members at home are ill.  Good FM.          Pregnancy history:     OBSTETRIC HISTORY:    Obstetric History       T1      L1     SAB1   TAB0   Ectopic0   Multiple0   Live Births1       # Outcome Date GA Lbr Christiano/2nd Weight Sex Delivery Anes PTL Lv   3 Current            2 Term 16 39w3d 05:01 / 00:32 3.155 kg (6 lb 15.3 oz) M Vag-Spont EPI N FERNANDO      Name: NERY WAGNER      Apgar1:  9                Apgar5: 9   1 SAB 08/13/15                  EDC: Estimated Date of Delivery: 10/21/18    Prenatal Labs:   Lab Results   Component Value Date    ABO A 03/15/2018    RH Pos 03/15/2018    AS Neg 03/15/2018    HEPBANG Nonreactive 03/15/2018    CHPCRT Negative 2018    GCPCRT Negative 2018    TREPAB Negative 03/15/2018    HGB 10.5 (L) 2018       GBS Status:   Lab Results   Component Value Date    GBS  2016     Negative  No GBS DNA detected, presumed negative for GBS or number of bacteria may be   below the limit of detection of the assay.   Assay performed on incubated broth culture of specimen using servtag real-time   PCR.         Active Problem List  Patient Active Problem List   Diagnosis     Eczema, dyshidrotic     Constipation     Migraine     Gastroesophageal reflux disease  without esophagitis     Encounter for supervision of other normal pregnancy     Encounter for triage in pregnant patient       Medication Prior to Admission  Prescriptions Prior to Admission   Medication Sig Dispense Refill Last Dose     ferrous sulfate (IRON) 325 (65 Fe) MG tablet Take 1 tablet (325 mg) by mouth daily (with breakfast) 90 tablet 2 9/16/2018 at Unknown time     ondansetron (ZOFRAN) 8 MG tablet Take 1 tablet (8 mg) by mouth every 8 hours as needed for nausea 30 tablet 1 9/15/2018 at Unknown time     Pediatric Multiple Vit-C-FA (CHILDRENS CHEWABLE VITAMINS PO)    9/16/2018 at Unknown time     RaNITidine HCl (ZANTAC PO)    9/15/2018 at Unknown time     Misc. Devices (BREAST PUMP) MISC 1 each daily 1 each 1      Prenatal Vit-Fe Fumarate-FA (PRENATAL MULTIVITAMIN PLUS IRON) 27-0.8 MG TABS per tablet Take 1 tablet by mouth daily 100 tablet 3      triamcinolone (KENALOG) 0.1 % ointment Apply sparingly to affected area three times daily for 14 days. 80 g 1 7/3/2018 at Unknown time   .        Maternal Past Medical History:     Past Medical History:   Diagnosis Date     Constipation      Eczema, dyshidrotic      Migraine                        Family History:   I have reviewed this patient's family history            Social History:   I have reviewed this patient's social history         Review of Systems:   CONSTITUTIONAL: NEGATIVE for fever, chills, change in weight  INTEGUMENTARY/SKIN: NEGATIVE for worrisome rashes, moles or lesions  EYES: NEGATIVE for vision changes or irritation  ENT/MOUTH: NEGATIVE for ear, mouth and throat problems  RESP: NEGATIVE for significant cough or SOB  BREAST: NEGATIVE for masses, tenderness or discharge  CV: NEGATIVE for chest pain, palpitations or peripheral edema  GI: NEGATIVE for nausea, abdominal pain, heartburn, or change in bowel habits  : NEGATIVE for frequency, dysuria, or hematuria  MUSCULOSKELETAL: NEGATIVE for significant arthralgias or myalgia  NEURO: NEGATIVE for  weakness, dizziness or paresthesias  ENDOCRINE: NEGATIVE for temperature intolerance, skin/hair changes  HEME: NEGATIVE for bleeding problems  PSYCHIATRIC: NEGATIVE for changes in mood or affect          Physical Exam:   Vitals were reviewed  All vitals stable  Temp: 98.3  F (36.8  C)   BP: 124/72     Resp: 18        Constitutional:   awake, alert, cooperative, no apparent distress, and appears stated age     Eyes:   Lids and lashes normal, pupils equal, round and reactive to light, extra ocular muscles intact, sclera clear, conjunctiva normal     ENT:   Normocephalic, without obvious abnormality, atraumatic, sinuses nontender on palpation, external ears without lesions, oral pharynx with moist mucous membranes, tonsils without erythema or exudates, gums normal and good dentition.     Neck:   Supple, symmetrical, trachea midline, no adenopathy, thyroid symmetric, not enlarged and no tenderness, skin normal     Back:   Symmetric, no curvature, spinous processes are non-tender on palpation, paraspinous muscles are non-tender on palpation, no costal vertebral tenderness  There is mild L flank tenderness/CVA-T     Lungs:   No increased work of breathing, good air exchange, clear to auscultation bilaterally, no crackles or wheezing     Cardiovascular:   Normal apical impulse, regular rate and rhythm, normal S1 and S2, no S3 or S4, and no murmur noted     Abdomen:   No scars, normal bowel sounds, soft, gravid uterus miller infant efw 5#, non-tender, no masses palpated, no hepatosplenomegally     Genitounirinary:   Cervix:  General appearance normal     Musculoskeletal:   There is no redness, warmth, or swelling of the joints.  Full range of motion noted.  Motor strength is 5 out of 5 all extremities bilaterally.  Tone is normal.     Neurologic:   Awake, alert, oriented to name, place and time.  Cranial nerves II-XII are grossly intact.  Motor is 5 out of 5 bilaterally.  Cerebellar finger to nose, heel to shin intact.   Sensory is intact.  Babinski down going, Romberg negative, and gait is normal.      Cervix:   Membranes: intact   Dilation: closed     Presentation:Cephalic  Fetal Heart Rate Tracing: Tier 1 (normal)  Tocometer: external monitor and adequate                       Assessment:   Olimpia Bhakta is a 35w0d pregnant female admitted with L flank pain  that radiates to LLQ.  UA with glucosuria (finger stick BS 96) hematuria (mild), pyuria          Plan:   Will check renal U/S to R/O nephrolithiasis/obstruction   UC sent  Appropriate Rx to follow based on lab findings    Bishop Scott MD

## 2018-09-16 NOTE — PLAN OF CARE
1510: MD states patient may be taken off fetal monitors.   1630: Pt transported via wheelchair to radiology for renal ultrasound.

## 2018-09-16 NOTE — TELEPHONE ENCOUNTER
Spouse calling back reporting they had not received a call back from the on call MD.  Placed call to [facility] to advise patient was en route.     Lupe Michaud RN  [facility]

## 2018-09-16 NOTE — PLAN OF CARE
, 35 weeks gestation. Has had lower left abdominal pain since last evening. Slightly tender to touch. Denies bleeding or leaking of fluid. Sates baby is active. Monitor applied. History and prenatal reviewed.

## 2018-09-16 NOTE — PROVIDER NOTIFICATION
09/16/18 1654   Provider Notification   Provider Name/Title Dr Scott   Method of Notification In Department     Dr Scott in department reviewing ultrasound results. MD states that patient may be discharged to home, encourage patient to orally hydrate and take tylenol as needed. Urine has been sent to culture and MD will call patient if urine culture warrants antibiotics.

## 2018-09-16 NOTE — IP AVS SNAPSHOT
MRN:[id number]                      After Visit Summary   9/16/2018    Olimpia Bhakta    MRN: [id number]           Thank you!     Thank you for choosing [facility] for your care. Our goal is always to provide you with excellent care. Hearing back from our patients is one way we can continue to improve our services. Please take a few minutes to complete the written survey that you may receive in the mail after you visit. If you would like to speak to someone directly about your visit please contact Patient Relations at [phone]. Thank you!          Patient Information     Date Of Birth          1990        About your hospital stay     You were admitted on:  September 16, 2018 You last received care in the:  [facility]    You were discharged on:  September 16, 2018       Who to Call     For medical emergencies, please call 911.  For non-urgent questions about your medical care, please call your primary care provider or clinic, [phone]          Attending Provider     Provider Specialty    Bishop Scott MD OB/Gyn       Primary Care Provider Office Phone # Fax #    Aubrey Houser [phone]      Your next 10 appointments already scheduled     Sep 24, 2018  6:45 PM CDT   ESTABLISHED PRENATAL with Aubrey Houser MD   [facility]    [address]  [address] [place] [id number] [phone]            Oct 01, 2018  2:15 PM CDT   ESTABLISHED PRENATAL with Aubrey Houser MD   [facility]    [address]  [address] [place] [id number] [phone]            Oct 08, 2018  6:45 PM CDT   ESTABLISHED PRENATAL with Aubrey Houser MD   [facility] ([facility]    [address]  [address] [place] [id number] [phone]            Oct 15, 2018  2:30  PM CDT   ESTABLISHED PRENATAL with Aubrey Houser MD   [facility]    [address]  [address]  [facility] [address] [phone]              Further instructions from your care team       Discharge Instruction for Undelivered Patients      You were seen for: Left sided abdominal pain  We Consulted: Dr. Scott  You had (Test or Medicine): Fetal and uterine monitoring, urinalysis and renal ultrasound     Diet:   Resume normal diet     Activity:  Call your doctor or nurse midwife if your baby is moving less than usual.     Call your provider if you notice:  Swelling in your face or increased swelling in your hands or legs.  Headaches that are not relieved by Tylenol (acetaminophen).  Changes in your vision (blurring: seeing spots or stars.)  Nausea (sick to your stomach) and vomiting (throwing up).   Weight gain of 5 pounds or more per week.  Heartburn that doesn't go away.  Signs of bladder infection: pain when you urinate (use the toilet), need to go more often and more urgently.  The bag of cason (rupture of membranes) breaks, or you notice leaking in your underwear.  Bright red blood in your underwear.  Abdominal (lower belly) or stomach pain.  For first baby: Contractions (tightening) less than 5 minutes apart for one hour or more.  Increase or change in vaginal discharge (note the color and amount)      Follow-up:  As scheduled in the clinic or sooner if symptoms persist.           Pending Results     Date and Time Order Name Status Description    9/16/2018 1320 Urine Culture Aerobic Bacterial In process             Admission Information     Date & Time Provider Department Dept. Phone    9/16/2018 Bishop Scott MD [facility] Labor and Delivery [phone]      Your Vitals Were     Blood Pressure Temperature Respirations Last Period          124/72 98.3  F (36.8  C) 18 01/14/2018 (Exact Date)        MyChart Information     MyChart gives  you secure access to your electronic health record. If you see a primary care provider, you can also send messages to your care team and make appointments. If you have questions, please call your primary care clinic.  If you do not have a primary care provider, please call [phone] and they will assist you.        Care EveryWhere ID     This is your Care EveryWhere ID. This could be used by other organizations to access your [facility] medical records  [id number]        Equal Access to Services     DARRON COOK : Hadii meggan mccord hadasho Soomaali, waaxda luqadaha, qaybta kaalmada adeegyada, chey morgan. So [facility] [phone].    ATENCIÓN: Si habla español, tiene a webber disposición servicios gratuitos de asistencia lingüística. Llame al [phone].    We comply with applicable federal civil rights laws and [place] laws. We do not discriminate on the basis of race, color, national origin, age, disability, sex, sexual orientation, or gender identity.               Review of your medicines      UNREVIEWED medicines. Ask your doctor about these medicines        Dose / Directions    CHILDRENS CHEWABLE VITAMINS PO        Refills:  0       ferrous sulfate 325 (65 Fe) MG tablet   Commonly known as:  IRON   Used for:  Encounter for supervision of other normal pregnancy in third trimester, Anemia        Dose:  325 mg   Take 1 tablet (325 mg) by mouth daily (with breakfast)   Quantity:  90 tablet   Refills:  2       ondansetron 8 MG tablet   Commonly known as:  ZOFRAN   Used for:  Hyperemesis gravidarum        Dose:  8 mg   Take 1 tablet (8 mg) by mouth every 8 hours as needed for nausea   Quantity:  30 tablet   Refills:  1       prenatal multivitamin plus iron 27-0.8 MG Tabs per tablet   Used for:  Encounter for supervision of other normal pregnancy in third trimester        Dose:  1 tablet   Take 1 tablet by mouth daily   Quantity:  100 tablet   Refills:  3       triamcinolone 0.1 % ointment    Commonly known as:  KENALOG   Used for:  Vaginitis and vulvovaginitis        Apply sparingly to affected area three times daily for 14 days.   Quantity:  80 g   Refills:  1       ZANTAC PO        Refills:  0         CONTINUE these medicines which have NOT CHANGED        Dose / Directions    breast pump Misc   Used for:  Encounter for supervision of other normal pregnancy in third trimester        Dose:  1 each   1 each daily   Quantity:  1 each   Refills:  1                Protect others around you: Learn how to safely use, store and throw away your medicines at www.disposemymeds.org.             Medication List: This is a list of all your medications and when to take them. Check marks below indicate your daily home schedule. Keep this list as a reference.      Medications           Morning Afternoon Evening Bedtime As Needed    breast pump Misc   1 each daily                                CHILDRENS CHEWABLE VITAMINS PO                                ferrous sulfate 325 (65 Fe) MG tablet   Commonly known as:  IRON   Take 1 tablet (325 mg) by mouth daily (with breakfast)                                ondansetron 8 MG tablet   Commonly known as:  ZOFRAN   Take 1 tablet (8 mg) by mouth every 8 hours as needed for nausea                                prenatal multivitamin plus iron 27-0.8 MG Tabs per tablet   Take 1 tablet by mouth daily                                triamcinolone 0.1 % ointment   Commonly known as:  KENALOG   Apply sparingly to affected area three times daily for 14 days.                                ZANTAC PO

## 2018-09-16 NOTE — IP AVS SNAPSHOT
[facility]    [address]    [facility] [address]    Phone:  [phone]    Fax:  [phone]                                       After Visit Summary   9/16/2018    Olimpia Bhakta    MRN: [id number]           After Visit Summary Signature Page     I have received my discharge instructions, and my questions have been answered. I have discussed any challenges I see with this plan with the nurse or doctor.    ..........................................................................................................................................  Patient/Patient Representative Signature      ..........................................................................................................................................  Patient Representative Print Name and Relationship to Patient    ..................................................               ................................................  Date                                   Time    ..........................................................................................................................................  Reviewed by Signature/Title    ...................................................              ..............................................  Date                                               Time          22EPIC Rev 08/18

## 2018-09-16 NOTE — TELEPHONE ENCOUNTER
"\"She has been having this leg cramping and back pain.\" Patient reporting intermittent low back, leg pain, into lower abd. Fetal movement positive. Denies vaginal bleeding or discharge. Patient reporting pain varies \"sometimes 3 minutes.\" Last episode 5 minutes ago of pain and cramping. Denies pain during triage.  Paged Dr Scott through [facility] [employer] Web at 935 a.m. To call patient at Ph. [phone]. Advised if no return call with in 20 minutes to call back.  Caller verbalized understanding. Denies further questions.    Lupe Michaud RN  [facility]      Reason for Disposition    MODERATE-SEVERE abdominal pain    Additional Information    Negative: Passed out (i.e., lost consciousness, collapsed and was not responding)    Negative: Shock suspected (e.g., cold/pale/clammy skin, too weak to stand, low BP, rapid pulse)    Negative: Difficult to awaken or acting confused  (e.g., disoriented, slurred speech)    Negative: [1] SEVERE abdominal pain (e.g., excruciating) AND [2] constant AND [3] present > 1 hour    Negative: Severe bleeding (e.g., continuous red blood from vagina, or large blood clots)    Negative: Umbilical cord hanging out of the vagina (shiny, white, curled appearance, \"like telephone cord\")    Negative: Uncontrollable urge to push (i.e., feels like baby is coming out now)    Negative: Can see baby    Negative: Sounds like a life-threatening emergency to the triager    Protocols used: PREGNANCY - LABOR - -ADULT-      "

## 2018-09-16 NOTE — PROVIDER NOTIFICATION
09/16/18 1225   Provider Notification   Provider Name/Title Dr. Scott   Method of Notification Phone   Request Evaluate - Remote   Notification Reason Other (Comment)  (orders received)

## 2018-09-16 NOTE — DISCHARGE INSTRUCTIONS
Discharge Instruction for Undelivered Patients      You were seen for: Left sided abdominal pain  We Consulted: Dr. Scott  You had (Test or Medicine): Fetal and uterine monitoring, urinalysis and renal ultrasound     Diet:   Resume normal diet     Activity:  Call your doctor or nurse midwife if your baby is moving less than usual.     Call your provider if you notice:  Swelling in your face or increased swelling in your hands or legs.  Headaches that are not relieved by Tylenol (acetaminophen).  Changes in your vision (blurring: seeing spots or stars.)  Nausea (sick to your stomach) and vomiting (throwing up).   Weight gain of 5 pounds or more per week.  Heartburn that doesn't go away.  Signs of bladder infection: pain when you urinate (use the toilet), need to go more often and more urgently.  The bag of cason (rupture of membranes) breaks, or you notice leaking in your underwear.  Bright red blood in your underwear.  Abdominal (lower belly) or stomach pain.  For first baby: Contractions (tightening) less than 5 minutes apart for one hour or more.  Increase or change in vaginal discharge (note the color and amount)      Follow-up:  As scheduled in the clinic or sooner if symptoms persist.

## 2018-09-17 LAB
BACTERIA SPEC CULT: NORMAL
BACTERIA SPEC CULT: NORMAL
Lab: NORMAL
SPECIMEN SOURCE: NORMAL

## 2018-09-18 ENCOUNTER — TELEPHONE (OUTPATIENT)
Dept: OBGYN | Facility: CLINIC | Age: 28
End: 2018-09-18

## 2018-09-18 NOTE — PROGRESS NOTES
Please notify the patient that the urine culture result does not clearly demonstrate a urinary tract infection.  The patient was seen in labor and delivery on Sunday, 9/16/2018 with left flank pain that was improving.  Please inquire how her pain is doing.  The patient was instructed to have a follow-up appointment in the office    Bishop Scott MD FACOG

## 2018-09-24 ENCOUNTER — PRENATAL OFFICE VISIT (OUTPATIENT)
Dept: OBGYN | Facility: CLINIC | Age: 28
End: 2018-09-24
Payer: COMMERCIAL

## 2018-09-24 VITALS — DIASTOLIC BLOOD PRESSURE: 60 MMHG | SYSTOLIC BLOOD PRESSURE: 124 MMHG | WEIGHT: 165 LBS | BODY MASS INDEX: 30.18 KG/M2

## 2018-09-24 DIAGNOSIS — Z34.83 ENCOUNTER FOR SUPERVISION OF OTHER NORMAL PREGNANCY IN THIRD TRIMESTER: Primary | ICD-10-CM

## 2018-09-24 PROBLEM — Z36.89 ENCOUNTER FOR TRIAGE IN PREGNANT PATIENT: Status: RESOLVED | Noted: 2018-09-16 | Resolved: 2018-09-24

## 2018-09-24 PROCEDURE — 99207 ZZC PRENATAL VISIT: CPT | Performed by: OBSTETRICS & GYNECOLOGY

## 2018-09-24 PROCEDURE — 87653 STREP B DNA AMP PROBE: CPT | Performed by: OBSTETRICS & GYNECOLOGY

## 2018-09-24 NOTE — NURSING NOTE
"Chief Complaint   Patient presents with     Prenatal Care       Initial /60  Wt 165 lb (74.8 kg)  LMP 2018 (Exact Date)  Breastfeeding? No  BMI 30.18 kg/m2 Estimated body mass index is 30.18 kg/(m^2) as calculated from the following:    Height as of 7/3/18: 5' 2\" (1.575 m).    Weight as of this encounter: 165 lb (74.8 kg).  BP completed using cuff size: regular        36w1d  + FM daily  - cramping or contractions  - heartburn  - edema  - bleeding  + back pain  Do Schultz LPN               "

## 2018-09-24 NOTE — MR AVS SNAPSHOT
After Visit Summary   9/24/2018    Olimpia Bhakta    MRN: [id number]           Patient Information     Date Of Birth          1990        Visit Information        Provider Department      9/24/2018 5:45 PM Aubrey Houser MD [facility]        Today's Diagnoses     Encounter for supervision of other normal pregnancy in third trimester    -  1       Follow-ups after your visit        Follow-up notes from your care team     Return in about 1 week (around 10/1/2018).      Your next 10 appointments already scheduled     Oct 01, 2018  2:15 PM CDT   ESTABLISHED PRENATAL with Aubrey Houser MD   [facility] ([facility]    [address]  [address] [place] [id number] [phone]            Oct 08, 2018  6:45 PM CDT   ESTABLISHED PRENATAL with Aubrey Houser MD   [facility] ([facility])    [address]  [address]  [facility] [id number] [phone]            Oct 15, 2018  2:30 PM CDT   ESTABLISHED PRENATAL with Aubrey Houser MD   [facility] ([facility])    [address]  [address] [place] [id number] [phone]              Who to contact     If you have questions or need follow up information about today's clinic visit or your schedule please contact [facility] directly at [phone].  Normal or non-critical lab and imaging results will be communicated to you by MyChart, letter or phone within 4 business days after the clinic has received the results. If you do not hear from us within 7 days, please contact the clinic through MyChart or phone. If you have a critical or abnormal lab result, we will notify you by phone as soon as possible.  Submit refill requests through [employer] (formerly [employer]) or call your pharmacy and they will forward the refill request to us. Please allow 3 business days for your refill to be  completed.          Additional Information About Your Visit        MyChart Information     [employer] gives you secure access to your electronic health record. If you see a primary care provider, you can also send messages to your care team and make appointments. If you have questions, please call your primary care clinic.  If you do not have a primary care provider, please call [phone] and they will assist you.        Care EveryWhere ID     This is your Care EveryWhere ID. This could be used by other organizations to access your [facility] medical records  [id number]        Your Vitals Were     Last Period Breastfeeding? BMI (Body Mass Index)             01/14/2018 (Exact Date) No 30.18 kg/m2          Blood Pressure from Last 3 Encounters:   09/24/18 124/60   09/16/18 124/72   09/10/18 110/66    Weight from Last 3 Encounters:   09/24/18 165 lb (74.8 kg)   09/10/18 162 lb (73.5 kg)   08/27/18 160 lb (72.6 kg)              Today, you had the following     No orders found for display       Primary Care Provider Office Phone # Fax #    Aubrey Houser [phone]       [address] [facility]  [address]  [facility] [address]        Equal Access to Services     DARRON COOK : Hadii meggan ku hadasho Soomaali, waaxda luqadaha, qaybta kaalmada adeegyada, chey morgan. So [facility] [phone].    ATENCIÓN: Si habla español, tiene a webber disposición servicios gratuitos de asistencia lingüística. Llame al [phone].    We comply with applicable federal civil rights laws and [place] laws. We do not discriminate on the basis of race, color, national origin, age, disability, sex, sexual orientation, or gender identity.            Thank you!     Thank you for choosing [facility]  for your care. Our goal is always to provide you with excellent care. Hearing back from our patients is one way we can continue to improve our services. Please take a few minutes to complete the  written survey that you may receive in the mail after your visit with us. Thank you!             Your Updated Medication List - Protect others around you: Learn how to safely use, store and throw away your medicines at www.disposemymeds.org.          This list is accurate as of 9/24/18  5:59 PM.  Always use your most recent med list.                   Brand Name Dispense Instructions for use Diagnosis    breast pump Misc     1 each    1 each daily    Encounter for supervision of other normal pregnancy in third trimester       CHILDRENS CHEWABLE VITAMINS PO           ferrous sulfate 325 (65 Fe) MG tablet    IRON    90 tablet    Take 1 tablet (325 mg) by mouth daily (with breakfast)    Encounter for supervision of other normal pregnancy in third trimester, Anemia       ondansetron 8 MG tablet    ZOFRAN    30 tablet    Take 1 tablet (8 mg) by mouth every 8 hours as needed for nausea    Hyperemesis gravidarum       prenatal multivitamin plus iron 27-0.8 MG Tabs per tablet     100 tablet    Take 1 tablet by mouth daily    Encounter for supervision of other normal pregnancy in third trimester       triamcinolone 0.1 % ointment    KENALOG    80 g    Apply sparingly to affected area three times daily for 14 days.    Vaginitis and vulvovaginitis       ZANTAC PO

## 2018-09-26 LAB
GP B STREP DNA SPEC QL NAA+PROBE: NEGATIVE
SPECIMEN SOURCE: NORMAL

## 2018-10-01 ENCOUNTER — PRENATAL OFFICE VISIT (OUTPATIENT)
Dept: OBGYN | Facility: CLINIC | Age: 28
End: 2018-10-01
Payer: COMMERCIAL

## 2018-10-01 VITALS
SYSTOLIC BLOOD PRESSURE: 102 MMHG | HEART RATE: 72 BPM | DIASTOLIC BLOOD PRESSURE: 66 MMHG | WEIGHT: 166 LBS | BODY MASS INDEX: 30.36 KG/M2

## 2018-10-01 DIAGNOSIS — Z34.83 ENCOUNTER FOR SUPERVISION OF OTHER NORMAL PREGNANCY IN THIRD TRIMESTER: Primary | ICD-10-CM

## 2018-10-01 PROCEDURE — 99207 ZZC PRENATAL VISIT: CPT | Performed by: OBSTETRICS & GYNECOLOGY

## 2018-10-01 NOTE — NURSING NOTE
"Chief Complaint   Patient presents with     Prenatal Care     baby active and moving - some tightening - occasional diarrhea - refill on zantac     37w1d    Initial /66 (BP Location: Left arm, Patient Position: Chair, Cuff Size: Adult Regular)  Pulse 72  Wt 166 lb (75.3 kg)  LMP 01/14/2018 (Exact Date)  BMI 30.36 kg/m2 Estimated body mass index is 30.36 kg/(m^2) as calculated from the following:    Height as of 7/3/18: 5' 2\" (1.575 m).    Weight as of this encounter: 166 lb (75.3 kg).  Medication Reconciliation: complete     Nurse assisted visit.  Margaret Garcia MA.      "

## 2018-10-01 NOTE — MR AVS SNAPSHOT
After Visit Summary   10/1/2018    Olimpia Bhakta    MRN: [id number]           Patient Information     Date Of Birth          1990        Visit Information        Provider Department      10/1/2018 2:15 PM Aubrey Houser MD [facility]        Today's Diagnoses     Encounter for supervision of other normal pregnancy in third trimester    -  1       Follow-ups after your visit        Follow-up notes from your care team     Return in about 1 week (around 10/8/2018).      Your next 10 appointments already scheduled     Oct 08, 2018  6:45 PM CDT   ESTABLISHED PRENATAL with Aubrey Houser MD   [facility] ([facility])    [address]  [address]  [facility] [id number] [phone]            Oct 15, 2018  2:30 PM CDT   ESTABLISHED PRENATAL with Aubrey Houser MD   [facility] ([facility])    [address]  [address]  [facility] [id number] [phone]              Who to contact     If you have questions or need follow up information about today's clinic visit or your schedule please contact [facility] directly at [phone].  Normal or non-critical lab and imaging results will be communicated to you by MyChart, letter or phone within 4 business days after the clinic has received the results. If you do not hear from us within 7 days, please contact the clinic through [facility] or phone. If you have a critical or abnormal lab result, we will notify you by phone as soon as possible.  Submit refill requests through mohchi or call your pharmacy and they will forward the refill request to us. Please allow 3 business days for your refill to be completed.          Additional Information About Your Visit        [facility] Information     [employer]chi gives you secure access to your electronic health record. If you see a primary care provider, you can also send messages to  your care team and make appointments. If you have questions, please call your primary care clinic.  If you do not have a primary care provider, please call [phone] and they will assist you.        Care EveryWhere ID     This is your Care EveryWhere ID. This could be used by other organizations to access your [place] medical records  [id number]        Your Vitals Were     Pulse Last Period BMI (Body Mass Index)             72 01/14/2018 (Exact Date) 30.36 kg/m2          Blood Pressure from Last 3 Encounters:   10/01/18 102/66   09/24/18 124/60   09/16/18 124/72    Weight from Last 3 Encounters:   10/01/18 166 lb (75.3 kg)   09/24/18 165 lb (74.8 kg)   09/10/18 162 lb (73.5 kg)              Today, you had the following     No orders found for display       Primary Care Provider Office Phone # Fax #    Aubrey Houser [phone]       [address]  [address] [place], [place] [id number]        Equal Access to Services     [facility]: Hadii aad ku hadasho Soomaali, waaxda luqadaha, qaybta kaalmada adeegyada, waxay edwinain hayalvaro servin . So [facility] [phone].    ATENCIÓN: Si habla español, tiene a webber disposición servicios gratuitos de asistencia lingüística. Llame al [phone].    We comply with applicable federal civil rights laws and [place] laws. We do not discriminate on the basis of race, color, national origin, age, disability, sex, sexual orientation, or gender identity.            Thank you!     Thank you for choosing [facility]  for your care. Our goal is always to provide you with excellent care. Hearing back from our patients is one way we can continue to improve our services. Please take a few minutes to complete the written survey that you may receive in the mail after your visit with us. Thank you!             Your Updated Medication List - Protect others around you: Learn how to safely use, store and throw away your medicines at  www.disposemymeds.org.          This list is accurate as of 10/1/18  2:43 PM.  Always use your most recent med list.                   Brand Name Dispense Instructions for use Diagnosis    breast pump Misc     1 each    1 each daily    Encounter for supervision of other normal pregnancy in third trimester       CHILDRENS CHEWABLE VITAMINS PO           ferrous sulfate 325 (65 Fe) MG tablet    IRON    90 tablet    Take 1 tablet (325 mg) by mouth daily (with breakfast)    Encounter for supervision of other normal pregnancy in third trimester, Anemia       ondansetron 8 MG tablet    ZOFRAN    30 tablet    Take 1 tablet (8 mg) by mouth every 8 hours as needed for nausea    Hyperemesis gravidarum       prenatal multivitamin plus iron 27-0.8 MG Tabs per tablet     100 tablet    Take 1 tablet by mouth daily    Encounter for supervision of other normal pregnancy in third trimester       triamcinolone 0.1 % ointment    KENALOG    80 g    Apply sparingly to affected area three times daily for 14 days.    Vaginitis and vulvovaginitis       ZANTAC PO

## 2018-10-08 ENCOUNTER — PRENATAL OFFICE VISIT (OUTPATIENT)
Dept: OBGYN | Facility: CLINIC | Age: 28
End: 2018-10-08
Payer: COMMERCIAL

## 2018-10-08 VITALS
SYSTOLIC BLOOD PRESSURE: 112 MMHG | BODY MASS INDEX: 30.91 KG/M2 | WEIGHT: 169 LBS | HEART RATE: 76 BPM | DIASTOLIC BLOOD PRESSURE: 70 MMHG

## 2018-10-08 DIAGNOSIS — Z34.93 ENCOUNTER FOR SUPERVISION OF NORMAL PREGNANCY IN THIRD TRIMESTER, UNSPECIFIED GRAVIDITY: ICD-10-CM

## 2018-10-08 PROBLEM — Z34.90 ENCOUNTER FOR SUPERVISION OF NORMAL PREGNANCY: Status: ACTIVE | Noted: 2018-10-08

## 2018-10-08 PROBLEM — Z34.80 ENCOUNTER FOR SUPERVISION OF OTHER NORMAL PREGNANCY: Status: RESOLVED | Noted: 2018-04-06 | Resolved: 2018-10-08

## 2018-10-08 PROCEDURE — 59426 ANTEPARTUM CARE ONLY: CPT | Performed by: OBSTETRICS & GYNECOLOGY

## 2018-10-08 PROCEDURE — 99207 ZZC PRENATAL VISIT: CPT | Performed by: OBSTETRICS & GYNECOLOGY

## 2018-10-08 NOTE — NURSING NOTE
"Chief Complaint   Patient presents with     Prenatal Care     baby active and moving - some tightening and pelvic pressure     38w1d    Initial /70 (BP Location: Left arm, Patient Position: Chair, Cuff Size: Adult Regular)  Pulse 76  Wt 169 lb (76.7 kg)  LMP 01/14/2018 (Exact Date)  BMI 30.91 kg/m2 Estimated body mass index is 30.91 kg/(m^2) as calculated from the following:    Height as of 7/3/18: 5' 2\" (1.575 m).    Weight as of this encounter: 169 lb (76.7 kg).  Medication Reconciliation: complete     Nurse assisted visit.  Margaret Garcia MA.      "

## 2018-10-08 NOTE — MR AVS SNAPSHOT
After Visit Summary   10/8/2018    Olimpia Bhakta    MRN: [id number]           Patient Information     Date Of Birth          1990        Visit Information        Provider Department      10/8/2018 6:45 PM Aubrey Houser MD [facility]        Today's Diagnoses     Encounter for supervision of normal pregnancy in third trimester, unspecified            Follow-ups after your visit        Follow-up notes from your care team     Return in about 1 week (around 10/15/2018).      Your next 10 appointments already scheduled     Oct 15, 2018  2:30 PM CDT   ESTABLISHED PRENATAL with Aubrey Houser MD   [facility] ([facility]    [address]  [address]  [facility] [address] [phone]            Oct 22, 2018  8:45 AM CDT   ESTABLISHED PRENATAL with Laura Jackson DO   [facility] ([facility])    [address]  [address]  [facility] [id number] [phone]              Who to contact     If you have questions or need follow up information about today's clinic visit or your schedule please contact [facility] directly at [phone].  Normal or non-critical lab and imaging results will be communicated to you by MyChart, letter or phone within 4 business days after the clinic has received the results. If you do not hear from us within 7 days, please contact the clinic through [employer] or phone. If you have a critical or abnormal lab result, we will notify you by phone as soon as possible.  Submit refill requests through [employer] or call your pharmacy and they will forward the refill request to us. Please allow 3 business days for your refill to be completed.          Additional Information About Your Visit        [employer] Information     [employer] gives you secure access to your electronic health record. If you see a primary care provider, you can also send  messages to your care team and make appointments. If you have questions, please call your primary care clinic.  If you do not have a primary care provider, please call [phone] and they will assist you.        Care EveryWhere ID     This is your Care EveryWhere ID. This could be used by other organizations to access your [facility] medical records  [id number]        Your Vitals Were     Pulse Last Period BMI (Body Mass Index)             76 01/14/2018 (Exact Date) 30.91 kg/m2          Blood Pressure from Last 3 Encounters:   10/08/18 112/70   10/01/18 102/66   09/24/18 124/60    Weight from Last 3 Encounters:   10/08/18 169 lb (76.7 kg)   10/01/18 166 lb (75.3 kg)   09/24/18 165 lb (74.8 kg)              Today, you had the following     No orders found for display       Primary Care Provider Office Phone # Fax #    Aubrey Houser [phone]       [address] [facility]  [address]  [facility] [address]        Equal Access to Services     [facility]: Hadii aad ku hadasho Soomaali, waaxda luqadaha, qaybta kaalmada adeegyada, waxay edwinain hayalvaro servin . So [facility] [phone].    ATENCIÓN: Si habla español, tiene a webber disposición servicios gratuitos de asistencia lingüística. Llame al [phone].    We comply with applicable federal civil rights laws and [place] laws. We do not discriminate on the basis of race, color, national origin, age, disability, sex, sexual orientation, or gender identity.            Thank you!     Thank you for choosing [facility]  for your care. Our goal is always to provide you with excellent care. Hearing back from our patients is one way we can continue to improve our services. Please take a few minutes to complete the written survey that you may receive in the mail after your visit with us. Thank you!             Your Updated Medication List - Protect others around you: Learn how to safely use, store and throw away your medicines at  www.disposemymeds.org.          This list is accurate as of 10/8/18  6:49 PM.  Always use your most recent med list.                   Brand Name Dispense Instructions for use Diagnosis    breast pump Misc     1 each    1 each daily    Encounter for supervision of other normal pregnancy in third trimester       CHILDRENS CHEWABLE VITAMINS PO           ferrous sulfate 325 (65 Fe) MG tablet    IRON    90 tablet    Take 1 tablet (325 mg) by mouth daily (with breakfast)    Encounter for supervision of other normal pregnancy in third trimester, Anemia       ondansetron 8 MG tablet    ZOFRAN    30 tablet    Take 1 tablet (8 mg) by mouth every 8 hours as needed for nausea    Hyperemesis gravidarum       prenatal multivitamin plus iron 27-0.8 MG Tabs per tablet     100 tablet    Take 1 tablet by mouth daily    Encounter for supervision of other normal pregnancy in third trimester       triamcinolone 0.1 % ointment    KENALOG    80 g    Apply sparingly to affected area three times daily for 14 days.    Vaginitis and vulvovaginitis       ZANTAC PO

## 2018-10-10 ENCOUNTER — ANESTHESIA EVENT (OUTPATIENT)
Dept: OBGYN | Facility: CLINIC | Age: 28
End: 2018-10-10
Payer: COMMERCIAL

## 2018-10-10 ENCOUNTER — ANESTHESIA (OUTPATIENT)
Dept: OBGYN | Facility: CLINIC | Age: 28
End: 2018-10-10
Payer: COMMERCIAL

## 2018-10-10 ENCOUNTER — HOSPITAL ENCOUNTER (INPATIENT)
Facility: CLINIC | Age: 28
LOS: 3 days | Discharge: HOME | End: 2018-10-13
Attending: OBSTETRICS & GYNECOLOGY | Admitting: OBSTETRICS & GYNECOLOGY
Payer: COMMERCIAL

## 2018-10-10 ENCOUNTER — TELEPHONE (OUTPATIENT)
Dept: OBGYN | Facility: CLINIC | Age: 28
End: 2018-10-10

## 2018-10-10 ENCOUNTER — HOSPITAL ENCOUNTER (OUTPATIENT)
Facility: CLINIC | Age: 28
Discharge: HOME | End: 2018-10-10
Attending: OBSTETRICS & GYNECOLOGY | Admitting: OBSTETRICS & GYNECOLOGY
Payer: COMMERCIAL

## 2018-10-10 ENCOUNTER — NURSE TRIAGE (OUTPATIENT)
Dept: NURSING | Facility: CLINIC | Age: 28
End: 2018-10-10

## 2018-10-10 VITALS
TEMPERATURE: 98.1 F | BODY MASS INDEX: 30.91 KG/M2 | DIASTOLIC BLOOD PRESSURE: 76 MMHG | SYSTOLIC BLOOD PRESSURE: 121 MMHG | HEIGHT: 62 IN | RESPIRATION RATE: 17 BRPM

## 2018-10-10 LAB
ABO + RH BLD: NORMAL
ABO + RH BLD: NORMAL
BLD GP AB SCN SERPL QL: NORMAL
BLOOD BANK CMNT PATIENT-IMP: NORMAL
FERN CRYSTALLIZATION: NORMAL
HGB BLD-MCNC: 11.6 G/DL (ref 11.7–15.7)
RUPTURE OF FETAL MEMBRANES BY ROM PLUS: NEGATIVE
SPECIMEN EXP DATE BLD: NORMAL
SPECIMEN SOURCE: ABNORMAL
WET PREP SPEC: ABNORMAL

## 2018-10-10 PROCEDURE — 37000011 ZZH ANESTHESIA WARD SERVICE

## 2018-10-10 PROCEDURE — 36415 COLL VENOUS BLD VENIPUNCTURE: CPT | Performed by: OBSTETRICS & GYNECOLOGY

## 2018-10-10 PROCEDURE — 84112 EVAL AMNIOTIC FLUID PROTEIN: CPT | Performed by: OBSTETRICS & GYNECOLOGY

## 2018-10-10 PROCEDURE — 86901 BLOOD TYPING SEROLOGIC RH(D): CPT | Performed by: OBSTETRICS & GYNECOLOGY

## 2018-10-10 PROCEDURE — 25000128 H RX IP 250 OP 636: Performed by: OBSTETRICS & GYNECOLOGY

## 2018-10-10 PROCEDURE — 25000128 H RX IP 250 OP 636: Performed by: ANESTHESIOLOGY

## 2018-10-10 PROCEDURE — 3E0R3BZ INTRODUCTION OF ANESTHETIC AGENT INTO SPINAL CANAL, PERCUTANEOUS APPROACH: ICD-10-PCS | Performed by: ANESTHESIOLOGY

## 2018-10-10 PROCEDURE — 10907ZC DRAINAGE OF AMNIOTIC FLUID, THERAPEUTIC FROM PRODUCTS OF CONCEPTION, VIA NATURAL OR ARTIFICIAL OPENING: ICD-10-PCS | Performed by: OBSTETRICS & GYNECOLOGY

## 2018-10-10 PROCEDURE — 87210 SMEAR WET MOUNT SALINE/INK: CPT | Performed by: OBSTETRICS & GYNECOLOGY

## 2018-10-10 PROCEDURE — 86850 RBC ANTIBODY SCREEN: CPT | Performed by: OBSTETRICS & GYNECOLOGY

## 2018-10-10 PROCEDURE — 25000128 H RX IP 250 OP 636

## 2018-10-10 PROCEDURE — 12000029 ZZH R&B OB INTERMEDIATE

## 2018-10-10 PROCEDURE — 86900 BLOOD TYPING SEROLOGIC ABO: CPT | Performed by: OBSTETRICS & GYNECOLOGY

## 2018-10-10 PROCEDURE — G0463 HOSPITAL OUTPT CLINIC VISIT: HCPCS

## 2018-10-10 PROCEDURE — 00HU33Z INSERTION OF INFUSION DEVICE INTO SPINAL CANAL, PERCUTANEOUS APPROACH: ICD-10-PCS | Performed by: ANESTHESIOLOGY

## 2018-10-10 PROCEDURE — 40000671 ZZH STATISTIC ANESTHESIA CASE

## 2018-10-10 PROCEDURE — 85018 HEMOGLOBIN: CPT | Performed by: OBSTETRICS & GYNECOLOGY

## 2018-10-10 PROCEDURE — 86780 TREPONEMA PALLIDUM: CPT | Performed by: OBSTETRICS & GYNECOLOGY

## 2018-10-10 PROCEDURE — 27110038 ZZH RX 271

## 2018-10-10 RX ORDER — EPHEDRINE SULFATE 50 MG/ML
INJECTION, SOLUTION INTRAMUSCULAR; INTRAVENOUS; SUBCUTANEOUS
Status: DISCONTINUED
Start: 2018-10-10 | End: 2018-10-11 | Stop reason: HOSPADM

## 2018-10-10 RX ORDER — ACETAMINOPHEN 325 MG/1
650 TABLET ORAL EVERY 4 HOURS PRN
Status: DISCONTINUED | OUTPATIENT
Start: 2018-10-10 | End: 2018-10-11

## 2018-10-10 RX ORDER — BUPIVACAINE HCL/0.9 % NACL/PF 0.125 %
PLASTIC BAG, INJECTION (ML) EPIDURAL CONTINUOUS
Status: DISCONTINUED | OUTPATIENT
Start: 2018-10-10 | End: 2018-10-11

## 2018-10-10 RX ORDER — EPHEDRINE SULFATE 50 MG/ML
5 INJECTION, SOLUTION INTRAMUSCULAR; INTRAVENOUS; SUBCUTANEOUS
Status: DISCONTINUED | OUTPATIENT
Start: 2018-10-10 | End: 2018-10-11

## 2018-10-10 RX ORDER — OXYTOCIN 10 [USP'U]/ML
10 INJECTION, SOLUTION INTRAMUSCULAR; INTRAVENOUS
Status: DISCONTINUED | OUTPATIENT
Start: 2018-10-10 | End: 2018-10-11

## 2018-10-10 RX ORDER — FENTANYL CITRATE 50 UG/ML
50-100 INJECTION, SOLUTION INTRAMUSCULAR; INTRAVENOUS
Status: DISCONTINUED | OUTPATIENT
Start: 2018-10-10 | End: 2018-10-11

## 2018-10-10 RX ORDER — ONDANSETRON 2 MG/ML
4 INJECTION INTRAMUSCULAR; INTRAVENOUS EVERY 6 HOURS PRN
Status: DISCONTINUED | OUTPATIENT
Start: 2018-10-10 | End: 2018-10-11

## 2018-10-10 RX ORDER — SODIUM CHLORIDE, SODIUM LACTATE, POTASSIUM CHLORIDE, CALCIUM CHLORIDE 600; 310; 30; 20 MG/100ML; MG/100ML; MG/100ML; MG/100ML
INJECTION, SOLUTION INTRAVENOUS CONTINUOUS
Status: DISCONTINUED | OUTPATIENT
Start: 2018-10-10 | End: 2018-10-11

## 2018-10-10 RX ORDER — CARBOPROST TROMETHAMINE 250 UG/ML
250 INJECTION, SOLUTION INTRAMUSCULAR
Status: DISCONTINUED | OUTPATIENT
Start: 2018-10-10 | End: 2018-10-11

## 2018-10-10 RX ORDER — ONDANSETRON 4 MG/1
4 TABLET, ORALLY DISINTEGRATING ORAL EVERY 6 HOURS PRN
Status: DISCONTINUED | OUTPATIENT
Start: 2018-10-10 | End: 2018-10-11

## 2018-10-10 RX ORDER — NALBUPHINE HYDROCHLORIDE 10 MG/ML
2.5-5 INJECTION, SOLUTION INTRAMUSCULAR; INTRAVENOUS; SUBCUTANEOUS EVERY 6 HOURS PRN
Status: DISCONTINUED | OUTPATIENT
Start: 2018-10-10 | End: 2018-10-11

## 2018-10-10 RX ORDER — ONDANSETRON 2 MG/ML
4 INJECTION INTRAMUSCULAR; INTRAVENOUS EVERY 6 HOURS PRN
Status: DISCONTINUED | OUTPATIENT
Start: 2018-10-10 | End: 2018-10-10 | Stop reason: HOSPADM

## 2018-10-10 RX ORDER — NALOXONE HYDROCHLORIDE 0.4 MG/ML
.1-.4 INJECTION, SOLUTION INTRAMUSCULAR; INTRAVENOUS; SUBCUTANEOUS
Status: DISCONTINUED | OUTPATIENT
Start: 2018-10-10 | End: 2018-10-11

## 2018-10-10 RX ORDER — IBUPROFEN 800 MG/1
800 TABLET, FILM COATED ORAL
Status: COMPLETED | OUTPATIENT
Start: 2018-10-10 | End: 2018-10-11

## 2018-10-10 RX ORDER — METHYLERGONOVINE MALEATE 0.2 MG/ML
200 INJECTION INTRAVENOUS
Status: DISCONTINUED | OUTPATIENT
Start: 2018-10-10 | End: 2018-10-11

## 2018-10-10 RX ORDER — LIDOCAINE 40 MG/G
CREAM TOPICAL
Status: DISCONTINUED | OUTPATIENT
Start: 2018-10-10 | End: 2018-10-11

## 2018-10-10 RX ORDER — OXYTOCIN/0.9 % SODIUM CHLORIDE 30/500 ML
100-340 PLASTIC BAG, INJECTION (ML) INTRAVENOUS CONTINUOUS PRN
Status: DISCONTINUED | OUTPATIENT
Start: 2018-10-10 | End: 2018-10-11

## 2018-10-10 RX ORDER — HYDROMORPHONE HYDROCHLORIDE 1 MG/ML
0.5 INJECTION, SOLUTION INTRAMUSCULAR; INTRAVENOUS; SUBCUTANEOUS ONCE
Status: COMPLETED | OUTPATIENT
Start: 2018-10-10 | End: 2018-10-10

## 2018-10-10 RX ORDER — BUPIVACAINE HCL/0.9 % NACL/PF 0.125 %
PLASTIC BAG, INJECTION (ML) EPIDURAL
Status: COMPLETED
Start: 2018-10-10 | End: 2018-10-10

## 2018-10-10 RX ORDER — OXYCODONE AND ACETAMINOPHEN 5; 325 MG/1; MG/1
1 TABLET ORAL
Status: DISCONTINUED | OUTPATIENT
Start: 2018-10-10 | End: 2018-10-11

## 2018-10-10 RX ADMIN — Medication: at 23:37

## 2018-10-10 RX ADMIN — SODIUM CHLORIDE, POTASSIUM CHLORIDE, SODIUM LACTATE AND CALCIUM CHLORIDE 1000 ML: 600; 310; 30; 20 INJECTION, SOLUTION INTRAVENOUS at 23:06

## 2018-10-10 RX ADMIN — Medication 0.5 MG: at 23:31

## 2018-10-10 NOTE — PROVIDER NOTIFICATION
10/10/18 1785   Provider Notification   Provider Name/Title Dr Ochoa   Method of Notification Phone   Request Evaluate - Remote   Notification Reason Lab/Diagnostic Study   MD updated of negative ROM+ and Fern, wet prep positive for yeast. MD does not wish to treat infection at this time. Will have pt follow up with next scheduled visit. Order to discharge to home.

## 2018-10-10 NOTE — IP AVS SNAPSHOT
MRN:[id number]                      After Visit Summary   10/10/2018    Olimpia Bhakta    MRN: [id number]           Thank you!     Thank you for choosing [facility] [place] [facility] for your care. Our goal is always to provide you with excellent care. Hearing back from our patients is one way we can continue to improve our services. Please take a few minutes to complete the written survey that you may receive in the mail after you visit. If you would like to speak to someone directly about your visit please contact Patient Relations at [phone]. Thank you!          Patient Information     Date Of Birth          1990        Designated Caregiver       Most Recent Value    Caregiver    Will someone help with your care after discharge? no      About your hospital stay     You were admitted on:  October 10, 2018 You last received care in the:  [facility] Postpartum    You were discharged on:  October 13, 2018        Reason for your hospital stay       Maternity care                  Who to Call     For medical emergencies, please call 911.  For non-urgent questions about your medical care, please call your primary care provider or clinic, [phone]          Attending Provider     Provider Specialty    Conrad Ochoa MD OB/Gyn       Primary Care Provider Office Phone # Fax #    Aubrey Houser [phone]      After Care Instructions     Activity       Review discharge instructions            Diet       Resume previous diet            Discharge Instructions - Postpartum visit       Schedule postpartum visit with your provider and return to clinic in 6 weeks.                  Your next 10 appointments already scheduled     Nov 19, 2018 10:00 AM CST   Post Partum with Aubrey Houser MD   [facility]    [address]  [address]  [facility] [id number] [phone]              Further instructions  from your care team       Postpartum Vaginal Delivery Instructions   Schedule appointment in 6 weeks for postpartum check.  Lactation     Activity       Ask family and friends for help when you need it.    Do not place anything in your vagina for 6 weeks.    You are not restricted on other activities, but take it easy for a few weeks to allow your body to recover from delivery.  You are able to do any activities you feel up to that point.    No driving until you have stopped taking your pain medications (usually two weeks after delivery).     Call your health care provider if you have any of these symptoms:       Increased pain, swelling, redness, or fluid around your stiches from an episiotomy or perineal tear.    A fever above 100.4 F (38 C) with or without chills when placing a thermometer under your tongue.    You soak a sanitary pad with blood within 1 hour, or you see blood clots larger than a golf ball.    Bleeding that lasts more than 6 weeks.    Vaginal discharge that smells bad.    Severe pain, cramping or tenderness in your lower belly area.    A need to urinate more frequently (use the toilet more often), more urgently (use the toilet very quickly), or it burns when you urinate.    Nausea and vomiting.    Redness, swelling or pain around a vein in your leg.    Problems breastfeeding or a red or painful area on your breast.    Chest pain and cough or are gasping for air.    Problems coping with sadness, anxiety, or depression.  If you have any concerns about hurting yourself or the baby, call your provider immediately.     You have questions or concerns after you return home.     Keep your hands clean:  Always wash your hands before touching your perineal area and stitches.  This helps reduce your risk of infection.  If your hands aren't dirty, you may use an alcohol hand-rub to clean your hands. Keep your nails clean and short.        Pending Results     No orders found from 10/8/2018 to  10/11/2018.            Statement of Approval     Ordered          10/13/18 0845  I have reviewed and agree with all the recommendations and orders detailed in this document.  EFFECTIVE NOW     Approved and electronically signed by:  Shawn Rice MD             Admission Information     Date & Time Provider Department Dept. Phone    10/10/2018 Conrad Ochoa MD [facility] [phone]      Your Vitals Were     Blood Pressure Pulse Temperature Respirations Last Period       113/63 75 99.2  F (37.3  C) (Oral) 16 01/14/2018 (Exact Date)       MyChart Information     [facility] gives you secure access to your electronic health record. If you see a primary care provider, you can also send messages to your care team and make appointments. If you have questions, please call your primary care clinic.  If you do not have a primary care provider, please call [phone] and they will assist you.        Care EveryWhere ID     This is your Care EveryWhere ID. This could be used by other organizations to access your [place] medical records  [id number]        Equal Access to Services     DARRON COOK AH: Hadii meggan stevenso Sosamantha, waaxda luqadaha, qaybta kaalmamelita adeamauri, chey servin . So [facility] [phone].    ATENCIÓN: Si habla español, tiene a webber disposición servicios gratuitos de asistencia lingüística. Llame al [phone].    We comply with applicable federal civil rights laws and [place] laws. We do not discriminate on the basis of race, color, national origin, age, disability, sex, sexual orientation, or gender identity.               Review of your medicines      START taking        Dose / Directions    ibuprofen 600 MG tablet   Commonly known as:  ADVIL/MOTRIN        Dose:  600 mg   Take 1 tablet (600 mg) by mouth every 6 hours as needed for moderate pain   Quantity:  60 tablet   Refills:  0         CONTINUE these medicines which may have CHANGED, or  have new prescriptions. If we are uncertain of the size of tablets/capsules you have at home, strength may be listed as something that might have changed.        Dose / Directions    * breast pump Misc   This may have changed:  Another medication with the same name was added. Make sure you understand how and when to take each.   Used for:  Encounter for supervision of other normal pregnancy in third trimester        Dose:  1 each   1 each daily   Quantity:  1 each   Refills:  1       * breast pump Misc   This may have changed:  You were already taking a medication with the same name, and this prescription was added. Make sure you understand how and when to take each.        Dose:  1 each   1 each as needed   Quantity:  1 each   Refills:  0       * Notice:  This list has 2 medication(s) that are the same as other medications prescribed for you. Read the directions carefully, and ask your doctor or other care provider to review them with you.      CONTINUE these medicines which have NOT CHANGED        Dose / Directions    CHILDRENS CHEWABLE VITAMINS PO        Refills:  0       ferrous sulfate 325 (65 Fe) MG tablet   Commonly known as:  IRON   Used for:  Encounter for supervision of other normal pregnancy in third trimester, Anemia        Dose:  325 mg   Take 1 tablet (325 mg) by mouth daily (with breakfast)   Quantity:  90 tablet   Refills:  2       triamcinolone 0.1 % ointment   Commonly known as:  KENALOG   Used for:  Vaginitis and vulvovaginitis        Apply sparingly to affected area three times daily for 14 days.   Quantity:  80 g   Refills:  1         STOP taking     ondansetron 8 MG tablet   Commonly known as:  ZOFRAN           prenatal multivitamin plus iron 27-0.8 MG Tabs per tablet           ZANTAC PO                Where to get your medicines      Some of these will need a paper prescription and others can be bought over the counter. Ask your nurse if you have questions.     Bring a paper prescription for  each of these medications     breast pump Misc    ibuprofen 600 MG tablet                Protect others around you: Learn how to safely use, store and throw away your medicines at www.disposemymeds.org.             Medication List: This is a list of all your medications and when to take them. Check marks below indicate your daily home schedule. Keep this list as a reference.      Medications           Morning Afternoon Evening Bedtime As Needed    * breast pump Misc   1 each daily                                * breast pump Misc   1 each as needed                                CHILDRENS CHEWABLE VITAMINS PO                                ferrous sulfate 325 (65 Fe) MG tablet   Commonly known as:  IRON   Take 1 tablet (325 mg) by mouth daily (with breakfast)   Last time this was given:  325 mg on 10/13/2018  9:56 AM                                ibuprofen 600 MG tablet   Commonly known as:  ADVIL/MOTRIN   Take 1 tablet (600 mg) by mouth every 6 hours as needed for moderate pain   Last time this was given:  800 mg on 10/13/2018  9:57 AM                                triamcinolone 0.1 % ointment   Commonly known as:  KENALOG   Apply sparingly to affected area three times daily for 14 days.                                * Notice:  This list has 2 medication(s) that are the same as other medications prescribed for you. Read the directions carefully, and ask your doctor or other care provider to review them with you.

## 2018-10-10 NOTE — IP AVS SNAPSHOT
[facility] Labor and Delivery    [address]    [facility] [address]    Phone:  [phone]    Fax:  [phone]                                       After Visit Summary   10/10/2018    Olimpia Bhakta    MRN: [id number]           After Visit Summary Signature Page     I have received my discharge instructions, and my questions have been answered. I have discussed any challenges I see with this plan with the nurse or doctor.    ..........................................................................................................................................  Patient/Patient Representative Signature      ..........................................................................................................................................  Patient Representative Print Name and Relationship to Patient    ..................................................               ................................................  Date                                   Time    ..........................................................................................................................................  Reviewed by Signature/Title    ...................................................              ..............................................  Date                                               Time          22EPIC Rev 08/18

## 2018-10-10 NOTE — DISCHARGE INSTRUCTIONS
Discharge Instruction for Undelivered Patients      You were seen for: Membrane Assessment  We Consulted: Dr Ochoa  You had (Test or Medicine):Membrane assessments and wet prep     Diet:   Drink 8 to 12 glasses of liquids (milk, juice, water) every day.  You may eat meals and snacks.     Activity:  Call your doctor or nurse midwife if your baby is moving less than usual.     Call your provider if you notice:  Swelling in your face or increased swelling in your hands or legs.  Headaches that are not relieved by Tylenol (acetaminophen).  Changes in your vision (blurring: seeing spots or stars.)  Nausea (sick to your stomach) and vomiting (throwing up).   Weight gain of 5 pounds or more per week.  Heartburn that doesn't go away.  Signs of bladder infection: pain when you urinate (use the toilet), need to go more often and more urgently.  The bag of cason (rupture of membranes) breaks, or you notice leaking in your underwear.  Bright red blood in your underwear.  Abdominal (lower belly) or stomach pain.  Second (plus) baby: Contractions (tightening) less than 10 minutes apart and getting stronger.  Increase or change in vaginal discharge (note the color and amount)  Other: call clinic if you start noticing symptoms associated with yeast infection.    Follow-up:  As scheduled in the clinic

## 2018-10-10 NOTE — TELEPHONE ENCOUNTER
38w3d  G 3 P 2.  Estimated Date of Delivery: Oct 21, 2018      Pt calls in and reports that she stood up and felt a trickling down her thigh about 5 min ago. Continues to leak fluid, clear    Dilated at last OV?: 2.  GBS:  Negative  Blood type:  A +  L &D notified  Baby active  Patient advised to go to L&D.    This message was routed to Dr Ochoa, on call provider.  Martha Vinson RN

## 2018-10-10 NOTE — IP AVS SNAPSHOT
[facility]    [address] [facility] [address]    Phone:  [phone]    Fax:  [phone]                                       After Visit Summary   10/10/2018    Olimpia Bhakta    MRN: [id number]           After Visit Summary Signature Page     I have received my discharge instructions, and my questions have been answered. I have discussed any challenges I see with this plan with the nurse or doctor.    ..........................................................................................................................................  Patient/Patient Representative Signature      ..........................................................................................................................................  Patient Representative Print Name and Relationship to Patient    ..................................................               ................................................  Date                                   Time    ..........................................................................................................................................  Reviewed by Signature/Title    ...................................................              ..............................................  Date                                               Time          22EPIC Rev 08/18

## 2018-10-10 NOTE — IP AVS SNAPSHOT
MRN:[id number]                      After Visit Summary   10/10/2018    Olimpia Bhakta    MRN: [id number]           Thank you!     Thank you for choosing [facility] for your care. Our goal is always to provide you with excellent care. Hearing back from our patients is one way we can continue to improve our services. Please take a few minutes to complete the written survey that you may receive in the mail after you visit. If you would like to speak to someone directly about your visit please contact Patient Relations at [phone]. Thank you!          Patient Information     Date Of Birth          1990        About your hospital stay     You were admitted on:  October 10, 2018 You last received care in the:  [facility] Labor and Delivery    You were discharged on:  October 10, 2018       Who to Call     For medical emergencies, please call 911.  For non-urgent questions about your medical care, please call your primary care provider or clinic, [phone]          Attending Provider     Provider Specialty    Conrad Ochoa MD OB/Gyn       Primary Care Provider Office Phone # Fax #    Aubrey Houser [phone]      Your next 10 appointments already scheduled     Oct 15, 2018  2:30 PM CDT   ESTABL[facility]    [address]  [address] [place] [id number] [phone]            Oct 22, 2018  8:45 AM CDT   ESTABLISHED PRENATAL with Laura Jackson DO   [facility]    [address]  [address] [place] [id number] [phone]              Further instructions from your care team       Discharge Instruction for Undelivered Patients      You were seen for: Membrane Assessment  We Consulted: Dr Ochoa  You had (Test or Medicine):Membrane assessments and wet prep     Diet:   Drink  "8 to 12 glasses of liquids (milk, juice, water) every day.  You may eat meals and snacks.     Activity:  Call your doctor or nurse midwife if your baby is moving less than usual.     Call your provider if you notice:  Swelling in your face or increased swelling in your hands or legs.  Headaches that are not relieved by Tylenol (acetaminophen).  Changes in your vision (blurring: seeing spots or stars.)  Nausea (sick to your stomach) and vomiting (throwing up).   Weight gain of 5 pounds or more per week.  Heartburn that doesn't go away.  Signs of bladder infection: pain when you urinate (use the toilet), need to go more often and more urgently.  The bag of cason (rupture of membranes) breaks, or you notice leaking in your underwear.  Bright red blood in your underwear.  Abdominal (lower belly) or stomach pain.  Second (plus) baby: Contractions (tightening) less than 10 minutes apart and getting stronger.  Increase or change in vaginal discharge (note the color and amount)  Other: call clinic if you start noticing symptoms associated with yeast infection.    Follow-up:  As scheduled in the clinic          Pending Results     No orders found from 10/8/2018 to 10/11/2018.            Admission Information     Date & Time Provider Department Dept. Phone    10/10/2018 Conrad Ochoa MD [facility] Labor and Delivery [phone]      Your Vitals Were     Blood Pressure Temperature Respirations Height Last Period BMI (Body Mass Index)    121/76 98.1  F (36.7  C) (Oral) 17 1.575 m (5' 2\") 01/14/2018 (Exact Date) 30.91 kg/m2      MyChart Information     [facility] gives you secure access to your electronic health record. If you see a primary care provider, you can also send messages to your care team and make appointments. If you have questions, please call your primary care clinic.  If you do not have a primary care provider, please call [phone] and they will assist you.        Care EveryWhere ID     This is your " Care EveryWhere ID. This could be used by other organizations to access your [place] medical records  [id number]        Equal Access to Services     DARRON COOK AH: Hadii meggan Fine, haley kulkarnimagiha, leroybecky guthriestacey niiamauri, chey edwinain hayaamikhail balesmraio rios laJamesonalvaro eddie. So [facility] [phone].    ATENCIÓN: Si habla español, tiene a webber disposición servicios gratuitos de asistencia lingüística. Llame al [phone].    We comply with applicable federal civil rights laws and [place] laws. We do not discriminate on the basis of race, color, national origin, age, disability, sex, sexual orientation, or gender identity.               Review of your medicines      UNREVIEWED medicines. Ask your doctor about these medicines        Dose / Directions    CHILDRENS CHEWABLE VITAMINS PO        Refills:  0       ferrous sulfate 325 (65 Fe) MG tablet   Commonly known as:  IRON   Used for:  Encounter for supervision of other normal pregnancy in third trimester, Anemia        Dose:  325 mg   Take 1 tablet (325 mg) by mouth daily (with breakfast)   Quantity:  90 tablet   Refills:  2       ondansetron 8 MG tablet   Commonly known as:  ZOFRAN   Used for:  Hyperemesis gravidarum        Dose:  8 mg   Take 1 tablet (8 mg) by mouth every 8 hours as needed for nausea   Quantity:  30 tablet   Refills:  1       prenatal multivitamin plus iron 27-0.8 MG Tabs per tablet   Used for:  Encounter for supervision of other normal pregnancy in third trimester        Dose:  1 tablet   Take 1 tablet by mouth daily   Quantity:  100 tablet   Refills:  3       triamcinolone 0.1 % ointment   Commonly known as:  KENALOG   Used for:  Vaginitis and vulvovaginitis        Apply sparingly to affected area three times daily for 14 days.   Quantity:  80 g   Refills:  1       ZANTAC PO        Refills:  0         CONTINUE these medicines which have NOT CHANGED        Dose / Directions    breast pump Misc   Used for:  Encounter for supervision of other  normal pregnancy in third trimester        Dose:  1 each   1 each daily   Quantity:  1 each   Refills:  1                Protect others around you: Learn how to safely use, store and throw away your medicines at www.disposemymeds.org.             Medication List: This is a list of all your medications and when to take them. Check marks below indicate your daily home schedule. Keep this list as a reference.      Medications           Morning Afternoon Evening Bedtime As Needed    breast pump Misc   1 each daily                                CHILDRENS CHEWABLE VITAMINS PO                                ferrous sulfate 325 (65 Fe) MG tablet   Commonly known as:  IRON   Take 1 tablet (325 mg) by mouth daily (with breakfast)                                ondansetron 8 MG tablet   Commonly known as:  ZOFRAN   Take 1 tablet (8 mg) by mouth every 8 hours as needed for nausea                                prenatal multivitamin plus iron 27-0.8 MG Tabs per tablet   Take 1 tablet by mouth daily                                triamcinolone 0.1 % ointment   Commonly known as:  KENALOG   Apply sparingly to affected area three times daily for 14 days.                                ZANTAC PO

## 2018-10-10 NOTE — PROGRESS NOTES
"Data: Patient assessed in the [facility] for leaking vaginal fluid.  Cervical exam 3/80/-2.  Membranes intact.  Contractions every 2-6 minutes and patient reports them as \"not painful\".  Action:  Presumed adequate fetal oxygenation documented (see flow record). Discharge instructions reviewed.  Patient instructed to report change in fetal movement, vaginal leaking of fluid or bleeding, abdominal pain, or any concerns related to the pregnancy to her nurse/physician.    Response: Orders to discharge home per Conrad Ochoa Md.  Patient verbalized understanding of education and verbalized agreement with plan. Discharged to home.     "

## 2018-10-10 NOTE — PLAN OF CARE
Updated MD of patient arrival 38w3d, R/O SROM--orders for Rom+ and Fern. Will continue to monitor and update with results.

## 2018-10-11 ENCOUNTER — TELEPHONE (OUTPATIENT)
Dept: OBGYN | Facility: CLINIC | Age: 28
End: 2018-10-11

## 2018-10-11 LAB — T PALLIDUM AB SER QL: NONREACTIVE

## 2018-10-11 PROCEDURE — 25000132 ZZH RX MED GY IP 250 OP 250 PS 637: Performed by: OBSTETRICS & GYNECOLOGY

## 2018-10-11 PROCEDURE — 0KQM0ZZ REPAIR PERINEUM MUSCLE, OPEN APPROACH: ICD-10-PCS | Performed by: OBSTETRICS & GYNECOLOGY

## 2018-10-11 PROCEDURE — 12000029 ZZH R&B OB INTERMEDIATE

## 2018-10-11 PROCEDURE — 72200001 ZZH LABOR CARE VAGINAL DELIVERY SINGLE

## 2018-10-11 PROCEDURE — 59410 OBSTETRICAL CARE: CPT | Performed by: OBSTETRICS & GYNECOLOGY

## 2018-10-11 PROCEDURE — 25000128 H RX IP 250 OP 636: Performed by: OBSTETRICS & GYNECOLOGY

## 2018-10-11 RX ORDER — HYDROCORTISONE 2.5 %
CREAM (GRAM) TOPICAL 3 TIMES DAILY PRN
Status: DISCONTINUED | OUTPATIENT
Start: 2018-10-11 | End: 2018-10-13 | Stop reason: HOSPADM

## 2018-10-11 RX ORDER — LANOLIN 100 %
OINTMENT (GRAM) TOPICAL
Status: DISCONTINUED | OUTPATIENT
Start: 2018-10-11 | End: 2018-10-13 | Stop reason: HOSPADM

## 2018-10-11 RX ORDER — OXYTOCIN/0.9 % SODIUM CHLORIDE 30/500 ML
100 PLASTIC BAG, INJECTION (ML) INTRAVENOUS CONTINUOUS
Status: DISCONTINUED | OUTPATIENT
Start: 2018-10-11 | End: 2018-10-12 | Stop reason: CLARIF

## 2018-10-11 RX ORDER — AMOXICILLIN 250 MG
2 CAPSULE ORAL 2 TIMES DAILY
Status: DISCONTINUED | OUTPATIENT
Start: 2018-10-11 | End: 2018-10-13 | Stop reason: HOSPADM

## 2018-10-11 RX ORDER — OXYTOCIN 10 [USP'U]/ML
10 INJECTION, SOLUTION INTRAMUSCULAR; INTRAVENOUS
Status: DISCONTINUED | OUTPATIENT
Start: 2018-10-11 | End: 2018-10-13 | Stop reason: HOSPADM

## 2018-10-11 RX ORDER — ACETAMINOPHEN 325 MG/1
650 TABLET ORAL EVERY 4 HOURS PRN
Status: DISCONTINUED | OUTPATIENT
Start: 2018-10-11 | End: 2018-10-13 | Stop reason: HOSPADM

## 2018-10-11 RX ORDER — IBUPROFEN 800 MG/1
800 TABLET, FILM COATED ORAL EVERY 6 HOURS PRN
Status: DISCONTINUED | OUTPATIENT
Start: 2018-10-11 | End: 2018-10-13 | Stop reason: HOSPADM

## 2018-10-11 RX ORDER — NALOXONE HYDROCHLORIDE 0.4 MG/ML
.1-.4 INJECTION, SOLUTION INTRAMUSCULAR; INTRAVENOUS; SUBCUTANEOUS
Status: DISCONTINUED | OUTPATIENT
Start: 2018-10-11 | End: 2018-10-13 | Stop reason: HOSPADM

## 2018-10-11 RX ORDER — AMOXICILLIN 250 MG
1 CAPSULE ORAL 2 TIMES DAILY
Status: DISCONTINUED | OUTPATIENT
Start: 2018-10-11 | End: 2018-10-13 | Stop reason: HOSPADM

## 2018-10-11 RX ORDER — BISACODYL 10 MG
10 SUPPOSITORY, RECTAL RECTAL DAILY PRN
Status: DISCONTINUED | OUTPATIENT
Start: 2018-10-13 | End: 2018-10-13 | Stop reason: HOSPADM

## 2018-10-11 RX ORDER — OXYTOCIN/0.9 % SODIUM CHLORIDE 30/500 ML
340 PLASTIC BAG, INJECTION (ML) INTRAVENOUS CONTINUOUS PRN
Status: DISCONTINUED | OUTPATIENT
Start: 2018-10-11 | End: 2018-10-13 | Stop reason: HOSPADM

## 2018-10-11 RX ADMIN — IBUPROFEN 800 MG: 800 TABLET ORAL at 14:22

## 2018-10-11 RX ADMIN — IBUPROFEN 800 MG: 800 TABLET ORAL at 05:39

## 2018-10-11 RX ADMIN — SODIUM CHLORIDE, POTASSIUM CHLORIDE, SODIUM LACTATE AND CALCIUM CHLORIDE: 600; 310; 30; 20 INJECTION, SOLUTION INTRAVENOUS at 02:04

## 2018-10-11 NOTE — PLAN OF CARE
"Problem: Postpartum (Vaginal Delivery) (Adult,Obstetrics,Pediatric)  Goal: Signs and Symptoms of Listed Potential Problems Will be Absent, Minimized or Managed (Postpartum)  Signs and symptoms of listed potential problems will be absent, minimized or managed by discharge/transition of care (reference Postpartum (Vaginal Delivery) (Adult,Obstetrics,Pediatric) CPG).   Outcome: No Change  Patient states she feels \"much better\" denies nausea/vomiting  Declines pain medication -denies pain   Up and around voiding without difficulty       "

## 2018-10-11 NOTE — ANESTHESIA PROCEDURE NOTES
Peripheral nerve/Neuraxial procedure note : epidural catheter  Pre-Procedure  Performed by STELLA WILSON  Location: OB, floor    Procedure Times:10/10/2018 11:17 PM and 10/10/2018 10:38 PM  Pre-Anesthestic Checklist: patient identified, IV checked, risks and benefits discussed, informed consent, monitors and equipment checked, pre-op evaluation and at physician/surgeon's request    Timeout  Correct Patient: Yes   Correct Procedure: Yes   Correct Site: Yes   Correct Laterality: N/A   Correct Position: Yes   Site Marked: No   .   Procedure Documentation    .    Procedure:    Epidural catheter.  Insertion Site:L2-3  (midline approach) Injection technique: LORT saline   Local skin infiltrated with 5 mL of 1% lidocaine.       Patient Prep;mask, sterile gloves, povidone-iodine 7.5% surgical scrub, patient draped.  .  Needle: Touhy needle Needle Gauge: 17.    Needle Length (Inches) 3.5  # of attempts: 1 and  # of redirects:  1 .   Catheter: 19 G . .  Catheter threaded easily  5 cm epidural space.  10 cm at skin.   .    Assessment/Narrative  Paresthesias: No.  .  .  Aspiration negative for heme or CSF  . Test dose of 5 mL at 23:33. Test dose negative for signs of intravascular, subdural or intrathecal injection. Comments:  I or my partner am immediately available. I or my partner will monitor the patient and supervise nursing care at necessary intervals.    Given 10 ml 0.125% bupivicaine infusion with 0.5 mg hydromorphone.

## 2018-10-11 NOTE — PROVIDER NOTIFICATION
10/10/18 2041   Provider Notification   Provider Name/Title Dr Ochoa   Method of Notification Phone   Request Evaluate - Remote   Notification Reason Patient Arrived;Status Update;SVE;Uterine Activity;Pain   Orders to recheck SVE in 1-2 hrs and update with result. Will continue to monitor and updapte as needed

## 2018-10-11 NOTE — H&P
No significant change in general health status based on examination of the patient, review of Nursing Admission Database and prenatal record.    EFW: 7lb 8oz

## 2018-10-11 NOTE — PROVIDER NOTIFICATION
10/10/18 2140   Provider Notification   Provider Name/Title Dr Ochoa   Method of Notification Phone     Dr Ochoa updated re: cervical change to 5.5 and patient feeling the urge to push with contractions. Requested MD to come to hospital. MD states he is on his way in.

## 2018-10-11 NOTE — PROVIDER NOTIFICATION
10/11/18 0219   Provider Notification   Provider Name/Title Dr. Ochoa   Method of Notification Phone   Request Evaluate - Remote   Notification Reason Status Update     OB called for update on patient. Patient resting comfortably with epidural. Contractions 3-4 minutes. SVE at 0045 7/80/-1.

## 2018-10-11 NOTE — LACTATION NOTE
This note was copied from a baby's chart.  LC to see patient and assist with positioning and latch.   Baby self latches very well, but Olimpia was shown better positioning to increase comfort so her nipple was not compressed.  Frequent swallows also noted.  No questions present.  Plan for continued staff support with nursing.

## 2018-10-11 NOTE — PROGRESS NOTES
[facility]   Post-Partum Progress Note          Assessment and Plan:    Assessment:   Post-partum day #0  Normal spontaneous vaginal delivery  L&D complications: None      Doing well.  Pain well-controlled.  Anemia of acute blood loss      Plan:   Ambulation encouraged  Anticipate discharge in 1-2 days  Iron sulfate on discharge           Interval History:   Doing well.  Pain is well-controlled.  No fevers.  No history of foul-smelling vaginal discharge.  Good appetite.  Denies chest pain, shortness of breath, nausea or vomiting.  Vaginal bleeding is similar to a heavy menstrual flow.  Ambulatory.  Breastfeeding well.          Significant Problems:    None          Review of Systems:    CONSTITUTIONAL: NEGATIVE for fever, chills, change in weight  INTEGUMENTARY/SKIN: NEGATIVE for worrisome rashes, moles or lesions  EYES: NEGATIVE for vision changes or irritation  ENT/MOUTH: NEGATIVE for ear, mouth and throat problems  RESP: NEGATIVE for significant cough or SOB  BREAST: NEGATIVE for masses, tenderness or discharge  CV: NEGATIVE for chest pain, palpitations or peripheral edema  GI: NEGATIVE for nausea, abdominal pain, heartburn, or change in bowel habits  : NEGATIVE for frequency, dysuria, or hematuria  MUSCULOSKELETAL: NEGATIVE for significant arthralgias or myalgia  NEURO: NEGATIVE for weakness, dizziness or paresthesias  ENDOCRINE: NEGATIVE for temperature intolerance, skin/hair changes  HEME: NEGATIVE for bleeding problems  PSYCHIATRIC: NEGATIVE for changes in mood or affect          Medications:   All medications related to the patient's surgery have been reviewed  -          Physical Exam:     All vitals stable  Patient Vitals for the past 8 hrs:   BP Temp Temp src Pulse   10/11/18 0800 115/73 99.3  F (37.4  C) - 65   10/11/18 0553 127/73 - - -   10/11/18 0538 126/72 - - -   10/11/18 0524 128/65 - - -   10/11/18 0508 180/88 - - -   10/11/18 0455 135/60 - - -   10/11/18 0438 124/72 - - -    10/11/18 0424 110/62 - - -   10/11/18 0414 - 98.4  F (36.9  C) Oral -   10/11/18 0408 136/64 - - -   10/11/18 0250 114/62 98.4  F (36.9  C) Oral -     Uterine fundus is firm, non-tender and at the level of the umbilicus          Data:     All laboratory data related to this surgery reviewed  Hemoglobin   Date Value Ref Range Status   10/10/2018 11.6 (L) 11.7 - 15.7 g/dL Final   07/30/2018 10.5 (L) 11.7 - 15.7 g/dL Final   03/15/2018 10.5 (L) 11.7 - 15.7 g/dL Final   07/08/2016 11.6 (L) 11.7 - 15.7 g/dL Final     Comment:     Results confirmed by repeat test   Reviewed: OK with previous     04/29/2016 10.0 (L) 11.7 - 15.7 g/dL Final     Comment:     Reviewed: OK with previous     -    Khushbu Avalos DO

## 2018-10-11 NOTE — TELEPHONE ENCOUNTER
Form received from: Patient / [employer]    Form requesting following info/need: STD    UMBERTO needed?: No    Location of form: Margaret / Mik Melara    When completed the route for return: Patient

## 2018-10-11 NOTE — PLAN OF CARE
Data: Olimpia Bhakta transferred to [facility] via wheelchair at 0635. Baby transferred via parent's arms.  Action: Receiving unit notified of transfer: Yes. Patient and family notified of room change. Report given to day nurse at 0710. Belongings sent to receiving unit. Accompanied by Registered Nurse. Oriented patient to surroundings. Call light within reach. ID bands double-checked with receiving RN.  Response: Patient tolerated transfer and is stable.

## 2018-10-11 NOTE — PROVIDER NOTIFICATION
10/10/18 2245   Provider Notification   Provider Name/Title Beard   Method of Notification At Bedside   Request Evaluate in Person   Notification Reason SVE;Status Update;Membrane Status   Dr. Ochoa  at bedside to evaluate.  SVE and AROM performed with clear fluids. Will continue to monitor and update as needed.

## 2018-10-11 NOTE — ANESTHESIA POSTPROCEDURE EVALUATION
Patient: Olimpia Bhakta      S/P epidural for labor.   I or my partner was immediately available for management of this patient during epidural analgesia infusion.  VSS.  Doing well. Block resolved.  Neuro at baseline. Denies positional headache. Minimal side effects easily managed w/ PRN meds. No apparent anesthetic complications. No follow-up required.    Irineo Canela MD    Note:  Anesthesia Post Evaluation    Last vitals:  Vitals:    10/11/18 0524 10/11/18 0538 10/11/18 0553   BP: 128/65 126/72 127/73   Resp:      Temp:            Electronically Signed By: Irineo Canela MD  October 11, 2018  8:24 AM

## 2018-10-11 NOTE — PROVIDER NOTIFICATION
10/10/18 2234   Provider Notification   Provider Name/Title Beard   Method of Notification In Department   Notification Reason SVE;Status Update;Uterine Activity   MD to come discuss AROM with patient

## 2018-10-11 NOTE — PROGRESS NOTES
Public Health Nurse (PHN) attempted to stop by to discuss [facility] resources, patient unavailable at this time as  hearing screen was in process.

## 2018-10-11 NOTE — L&D DELIVERY NOTE
OB Vaginal Delivery Note    Olimpia Bhakta MRN# [id number]   Age: 28 year old YOB: 1990       GA: 38w4d  GP:   Labor Complications: None   EBL:    mL  QBL: 376 mL  Delivery Type: Vaginal, Spontaneous Delivery   ROM to Delivery Time: 5h 23m  [place] Weight:      1 Minute 5 Minute 10 Minute   Apgar Totals: 9    9                Delivery Details:  Olimpia Bhakta, a 28 year old  female delivered a viable infant with apgars of 9   and 9  . Patient was fully dilated and pushing after 8  hours 5  minutes in active labor. Delivery was via vaginal, spontaneous delivery  to a sterile field under epidural  anesthesia. Infant delivered in vertex  middle  occiput  anterior  position. Anterior and posterior shoulders delivered without difficulty. The cord was clamped, cut twice and 3 vessels  were noted. Cord blood was obtained in routine fashion with the following disposition: lab .      Cord complications: none   Placenta delivered at 10/11  4:13 AM . Placental disposition was Hospital disposal . Fundal massage performed and fundus found to be firm.     Episiotomy: none    Perineum, vagina, cervix were inspected, and the following lacerations were noted:   Perineal lacerations: 2nd            vaginal laceration noted         Any lacerations were repaired in the usual fashion using 3-0 chromic and 2-0 vicryl.    Excellent hemostasis was noted. Needle count correct. Infant and patient in delivery room in good and stable condition.         Labor Event Times    Labor onset date:  10/10/18 Onset time:   7:30 PM   Dilation complete date:  10/11/18 Complete time:   3:35 AM   Start pushing date/time:  10/11/2018 0400            Labor Length    1st Stage (hrs):  8 (min):  5   2nd Stage (hrs):  0 (min):  33   3rd Stage (hrs):  0 (min):  5      Labor Events     labor?:  No    steroids:  None   Labor Type:  Spontaneous, AROM   Predominate monitoring during 1st stage:  continuous  electronic fetal monitoring      Antibiotics received during labor?:  No      Rupture date/time: 10/10/18 2245   Rupture type:  Artificial Rupture of Membranes   Fluid color:  Clear   Fluid odor:  Normal      1:1 continuous labor support provided by?:  RN          Delivery/Placenta Date and Time    Delivery Date:  10/11/18 Delivery Time:   4:08 AM   Placenta Date/Time:  10/11/2018  4:13 AM   Oxytocin given at the time of delivery:  after delivery of placenta      Vaginal Counts    Initial count performed by 2 team members:   Two Team Members   Dr. Don Veliz RN          [employer]   Initial counts 2  5    Added to count  2     Final counts          Placed during labor Accounted for at the end of labor   NA NA   NA NA   NA NA      Final count performed by 2 team members:   Two Team Members   Dr. Don Veliz RN               Apgars    Living status:  Living    1 Minute 5 Minute 10 Minute 15 Minute 20 Minute   Skin color: 1  1       Heart rate: 2  2       Reflex irritability: 2  2       Muscle tone: 2  2       Respiratory effort: 2  2       Total: 9  9             Cord    Vessels:  3 Vessels Complications:  None   Cord Blood Disposition:  Lab Gases Sent?:  No          Resuscitation    Methods:  None         Skin to Skin and Feeding Plan    Skin to skin initiation date/time: 10/11/18 0408   Skin to skin with:  Mother   Skin to skin end date/time:        Labor Events and Shoulder Dystocia    Fetal Tracing Prior to Delivery:  Category 2   Shoulder dystocia present?:  Neg            Delivery (Maternal) (Provider to Complete) (178307)    Episiotomy:  None   Perineal lacerations:  2nd    Vaginal laceration?:  Yes Repaired?:  Yes   Cervical laceration?:  No          Mother's Information  Mother: Olimpia Bhakta #[id number]    Start of Mother's Information     IO Blood Loss  10/10/18 1930 - 10/11/18 0500    Mom's I/O Activity            End of Mother's  Information  Mother: Olimpia Bhakta #[id number]            Delivery - Provider to Complete ([id number])    Delivering clinician:  CONRAD OCHOA   Delivery Type (Choose the 1 that will go to the Birth History):  Vaginal, Spontaneous Delivery                           Placenta    Delayed Cord Clamping:  Done   Date/Time:  10/11/2018  4:13 AM   Removal:  Spontaneous   Disposition:  Hospital disposal      Anesthesia    Method:  Epidural   Cervical dilation at placement:  4-7         Presentation and Position    Presentation:  Vertex   Position:  Middle Occiput Anterior                    Conrad Ochoa MD, MD

## 2018-10-11 NOTE — PLAN OF CARE
Data: Patient presented to [facility] via ambulance: 10/10/2018  8:09 PM.  Reason for maternal/fetal assessment is uterine contractions. Patient reports increase in intensity of contractions. SVE 3.5/80/-2, minimal change. Patient is a .  Prenatal record reviewed. Pregnancy has been uncomplicated..  Gestational Age 38w3d. VSS. Fetal movement present. Patient denies leaking of vaginal fluid/rupture of membranes, vaginal bleeding, abdominal pain, pelvic pressure, nausea, vomiting, headache, visual disturbances, epigastric or URQ pain, significant edema. Support person is not present.   Action: Verbal consent for EFM. Triage assessment completed. Bill of rights reviewed.  Response: Patient verbalized agreement with plan. Will contact Dr Conrad Ochoa Md with update and further orders.

## 2018-10-11 NOTE — PLAN OF CARE
Dr Ochoa in department, updated of SVE and MDA at bedside placing epidural. Belen BELLAMY RN will update with further findings.

## 2018-10-11 NOTE — TELEPHONE ENCOUNTER
Patient calling stating she was discharge from Labor and Deliver an hour ago to rule out labor. Currently having contractions every hour. States feeling too weak to stand. Per guideline, advised patient call 911 and be transported to the hospital.     RN called [facility] Delivery to inform charge nurse patient is coming to the hospital.     Kristian Nye RN  [facility]       Reason for Disposition    Shock suspected (e.g., cold/pale/clammy skin, too weak to stand, low BP, rapid pulse)    Additional Information    Negative: Passed out (i.e., lost consciousness, collapsed and was not responding)    Protocols used: PREGNANCY - LABOR-ADULT-

## 2018-10-11 NOTE — PROVIDER NOTIFICATION
10/10/18 3201   Provider Notification   Provider Name/Title Dr. Molina   Method of Notification At Bedside     MDA here to place epidural

## 2018-10-11 NOTE — PROVIDER NOTIFICATION
10/11/18 0336   Provider Notification   Provider Name/Title Dr. Ochoa   Method of Notification Phone   Request Evaluate - Remote   Notification Reason SVE     OB updated. Patient complete and feeling pressure. Will start and pushing and call when ready for delivery.

## 2018-10-12 LAB — HGB BLD-MCNC: 9.9 G/DL (ref 11.7–15.7)

## 2018-10-12 PROCEDURE — 12000027 ZZH R&B OB

## 2018-10-12 PROCEDURE — 25000132 ZZH RX MED GY IP 250 OP 250 PS 637: Performed by: OBSTETRICS & GYNECOLOGY

## 2018-10-12 PROCEDURE — 85018 HEMOGLOBIN: CPT | Performed by: OBSTETRICS & GYNECOLOGY

## 2018-10-12 PROCEDURE — 25000128 H RX IP 250 OP 636: Performed by: OBSTETRICS & GYNECOLOGY

## 2018-10-12 PROCEDURE — 36415 COLL VENOUS BLD VENIPUNCTURE: CPT | Performed by: OBSTETRICS & GYNECOLOGY

## 2018-10-12 PROCEDURE — 90715 TDAP VACCINE 7 YRS/> IM: CPT | Performed by: OBSTETRICS & GYNECOLOGY

## 2018-10-12 PROCEDURE — 90686 IIV4 VACC NO PRSV 0.5 ML IM: CPT | Performed by: OBSTETRICS & GYNECOLOGY

## 2018-10-12 RX ORDER — FERROUS SULFATE 325(65) MG
325 TABLET ORAL 2 TIMES DAILY
Status: DISCONTINUED | OUTPATIENT
Start: 2018-10-12 | End: 2018-10-13 | Stop reason: HOSPADM

## 2018-10-12 RX ADMIN — IBUPROFEN 800 MG: 800 TABLET ORAL at 14:03

## 2018-10-12 RX ADMIN — IBUPROFEN 800 MG: 800 TABLET ORAL at 07:50

## 2018-10-12 RX ADMIN — INFLUENZA A VIRUS A/MICHIGAN/45/2015 X-275 (H1N1) ANTIGEN (FORMALDEHYDE INACTIVATED), INFLUENZA A VIRUS A/SINGAPORE/INFIMH-16-0019/2016 IVR-186 (H3N2) ANTIGEN (FORMALDEHYDE INACTIVATED), INFLUENZA B VIRUS B/PHUKET/3073/2013 ANTIGEN (FORMALDEHYDE INACTIVATED), AND INFLUENZA B VIRUS B/MARYLAND/15/2016 BX-69A ANTIGEN (FORMALDEHYDE INACTIVATED) 0.5 ML: 15; 15; 15; 15 INJECTION, SUSPENSION INTRAMUSCULAR at 11:17

## 2018-10-12 RX ADMIN — CLOSTRIDIUM TETANI TOXOID ANTIGEN (FORMALDEHYDE INACTIVATED), CORYNEBACTERIUM DIPHTHERIAE TOXOID ANTIGEN (FORMALDEHYDE INACTIVATED), BORDETELLA PERTUSSIS TOXOID ANTIGEN (GLUTARALDEHYDE INACTIVATED), BORDETELLA PERTUSSIS FILAMENTOUS HEMAGGLUTININ ANTIGEN (FORMALDEHYDE INACTIVATED), BORDETELLA PERTUSSIS PERTACTIN ANTIGEN, AND BORDETELLA PERTUSSIS FIMBRIAE 2/3 ANTIGEN 0.5 ML: 5; 2; 2.5; 5; 3; 5 INJECTION, SUSPENSION INTRAMUSCULAR at 11:16

## 2018-10-12 RX ADMIN — IBUPROFEN 800 MG: 800 TABLET ORAL at 01:28

## 2018-10-12 RX ADMIN — IBUPROFEN 800 MG: 800 TABLET ORAL at 20:14

## 2018-10-12 RX ADMIN — FERROUS SULFATE TAB 325 MG (65 MG ELEMENTAL FE) 325 MG: 325 (65 FE) TAB at 14:03

## 2018-10-12 RX ADMIN — FERROUS SULFATE TAB 325 MG (65 MG ELEMENTAL FE) 325 MG: 325 (65 FE) TAB at 20:14

## 2018-10-12 NOTE — PLAN OF CARE
Problem: Patient Care Overview  Goal: Plan of Care/Patient Progress Review  Outcome: Improving  VSS. Pt is voiding without difficulty. Tolerating regular diet well. Denied nausea and vomiting when asked. Independent in self cares and is able to care for infant. Bonding well with . Continue to monitor.

## 2018-10-12 NOTE — PLAN OF CARE
Problem: Patient Care Overview  Goal: Plan of Care/Patient Progress Review  Outcome: Improving  Pt able to get small periods of rest between cares w/  rooming-in for night.  States ordered pain medications making her more comfortable.  LLE varicosities noted.  Occasionally using abdominal binder.  FOB present; pt performing cares w/ his assist, or independently.  Continue to monitor.

## 2018-10-12 NOTE — PROGRESS NOTES
Yun Evans CNM Progress Note: Postpartum Day #1    2018  11:43 AM    SUBJECTIVE:  Patient is stable and is tolerating acitivity well  Baby is rooming in  Complications since 2 hours post delivery: None  Pain is well controlled.  Patient is not taking pain medications.  Breastfeeding status:initiated   Elimination:  She is voiding without difficulty.  She has not had a bowel movement  Denies heavy bleeding and passing large clots.  Feels good about birth experience.    OBJECTIVE:  /80  Pulse 62  Temp 98.9  F (37.2  C) (Oral)  Resp 16  LMP 2018 (Exact Date)  Breastfeeding? Unknown    Constitutional: healthy, alert and no distress    Breasts: Currently breastfeeding    Fundus: Uterine fundus is firm, non-tender and below the level of the umbilicus    Perineum: Perineum is intact and/or well approximated, minimal swelling    Lochia: Lochia is appropriate for the duration of time since delivery.     ASSESSMENT:  PPD #1    Doing well.  No excessive bleeding  Pain well-controlled.  Hemoglobin   Date Value Ref Range Status   10/12/2018 9.9 (L) 11.7 - 15.7 g/dL Final   ]      PLAN:  Continue routine care  Will ensure that patient continues prenatal vitamin, HGB 9.9, patient not symptomatic  Ambulation encouraged  Reviewed breastfeeding  Reviewed postpartum warning signs  Reviewed postpartum blues and postpartum depression warning signs  Anticipated discharge tomorrow        SERGIO MAXWELL CNM-student

## 2018-10-12 NOTE — PLAN OF CARE
Problem: Patient Care Overview  Goal: Plan of Care/Patient Progress Review  Outcome: Improving  PT independent with self and baby cares. She is breast and formula feeding. Pumping explained and she prefers manual pump but did show her electric pump and will encourage that as well. Ibu for pain. WIll monitor.

## 2018-10-13 VITALS
DIASTOLIC BLOOD PRESSURE: 63 MMHG | HEART RATE: 75 BPM | RESPIRATION RATE: 16 BRPM | SYSTOLIC BLOOD PRESSURE: 113 MMHG | TEMPERATURE: 99.2 F

## 2018-10-13 PROCEDURE — 25000132 ZZH RX MED GY IP 250 OP 250 PS 637: Performed by: OBSTETRICS & GYNECOLOGY

## 2018-10-13 RX ORDER — BREAST PUMP
1 EACH MISCELLANEOUS PRN
Qty: 1 EACH | Refills: 0 | Status: SHIPPED | OUTPATIENT
Start: 2018-10-13 | End: 2020-08-05

## 2018-10-13 RX ORDER — IBUPROFEN 600 MG/1
600 TABLET, FILM COATED ORAL EVERY 6 HOURS PRN
Qty: 60 TABLET | Refills: 0 | Status: SHIPPED | OUTPATIENT
Start: 2018-10-13 | End: 2020-08-05

## 2018-10-13 RX ADMIN — IBUPROFEN 800 MG: 800 TABLET ORAL at 01:53

## 2018-10-13 RX ADMIN — FERROUS SULFATE TAB 325 MG (65 MG ELEMENTAL FE) 325 MG: 325 (65 FE) TAB at 09:56

## 2018-10-13 RX ADMIN — ACETAMINOPHEN 650 MG: 325 TABLET, FILM COATED ORAL at 05:46

## 2018-10-13 RX ADMIN — SENNOSIDES AND DOCUSATE SODIUM 1 TABLET: 8.6; 5 TABLET ORAL at 05:30

## 2018-10-13 RX ADMIN — IBUPROFEN 800 MG: 800 TABLET ORAL at 09:57

## 2018-10-13 ASSESSMENT — ACTIVITIES OF DAILY LIVING (ADL)
RETIRED_EATING: 0-->INDEPENDENT
BATHING: 0-->INDEPENDENT
TRANSFERRING: 0-->INDEPENDENT
RETIRED_COMMUNICATION: 0-->UNDERSTANDS/COMMUNICATES WITHOUT DIFFICULTY
SWALLOWING: 0-->SWALLOWS FOODS/LIQUIDS WITHOUT DIFFICULTY
TOILETING: 0-->INDEPENDENT
AMBULATION: 0-->INDEPENDENT
DRESS: 0-->INDEPENDENT
COGNITION: 0 - NO COGNITION ISSUES REPORTED
FALL_HISTORY_WITHIN_LAST_SIX_MONTHS: NO

## 2018-10-13 NOTE — PLAN OF CARE
Problem: Patient Care Overview  Goal: Plan of Care/Patient Progress Review  Outcome: Adequate for Discharge Date Met: 10/13/18  Data: Vital signs within normal limits. Postpartum checks within normal limits - see flow record. Patient eating and drinking normally. Patient able to empty bladder independently and is up ambulating. No apparent signs of infection Perineal laceration healing well, using ice and tucks for comfort. Motherlove cream given for tender nipples.  Patient performing self cares and is able to care for infant.  Action: Patient medicated during the shift for pain. See MAR. Patient reassessed within 1 hour after each medication and pain was improved - patient stated she was comfortable. Patient education done about . See flow record.  Response: Positive attachment behaviors observed with infant. Support persons spouse present.   Plan: Anticipate discharge today 10/13 All questions answered,   , will take iron at home and has ibuprofen ordered to take home for pain. EPDS score 3. Seen by DR yaneli noel for discharge to home today, follow up in 6 weeks for routine postpartum check.

## 2018-10-13 NOTE — PLAN OF CARE
Problem: Patient Care Overview  Goal: Plan of Care/Patient Progress Review  Outcome: Improving  Pt. VSS. Pain managed with ibuprofen, ice packs, and tucks. Independent in infant and personal cares. Breastfeeding infant. Nipples sore, using mother's love. Attentive to infant needs and bonding well with infant. FOB at bedside, supportive.

## 2018-10-13 NOTE — PLAN OF CARE
Problem: Patient Care Overview  Goal: Plan of Care/Patient Progress Review  Outcome: Improving  Data: Vital signs within normal limits. Postpartum checks within normal limits - see flow record. Patient eating and drinking normally. Patient able to empty bladder independently and is up ambulating. No apparent signs of infection. Patient performing self cares and is able to care for infant.  Action: PRN Ibuprofen given for pain. See MAR. Patient reassessed within 1 hour after each medication and pain was improved - patient stated she was comfortable.   Response: Positive attachment behaviors observed with infant.  present and supportive.  Plan: Anticipate discharge on 10/13/2018.

## 2018-10-13 NOTE — DISCHARGE INSTRUCTIONS
Postpartum Vaginal Delivery Instructions   Schedule appointment in 6 weeks for postpartum check.  Lactation     Activity       Ask family and friends for help when you need it.    Do not place anything in your vagina for 6 weeks.    You are not restricted on other activities, but take it easy for a few weeks to allow your body to recover from delivery.  You are able to do any activities you feel up to that point.    No driving until you have stopped taking your pain medications (usually two weeks after delivery).     Call your health care provider if you have any of these symptoms:       Increased pain, swelling, redness, or fluid around your stiches from an episiotomy or perineal tear.    A fever above 100.4 F (38 C) with or without chills when placing a thermometer under your tongue.    You soak a sanitary pad with blood within 1 hour, or you see blood clots larger than a golf ball.    Bleeding that lasts more than 6 weeks.    Vaginal discharge that smells bad.    Severe pain, cramping or tenderness in your lower belly area.    A need to urinate more frequently (use the toilet more often), more urgently (use the toilet very quickly), or it burns when you urinate.    Nausea and vomiting.    Redness, swelling or pain around a vein in your leg.    Problems breastfeeding or a red or painful area on your breast.    Chest pain and cough or are gasping for air.    Problems coping with sadness, anxiety, or depression.  If you have any concerns about hurting yourself or the baby, call your provider immediately.     You have questions or concerns after you return home.     Keep your hands clean:  Always wash your hands before touching your perineal area and stitches.  This helps reduce your risk of infection.  If your hands aren't dirty, you may use an alcohol hand-rub to clean your hands. Keep your nails clean and short.

## 2018-10-13 NOTE — DISCHARGE SUMMARY
[facility] Obstetrics Post-Partum Progress Note          Assessment and Plan:    Assessment:   Post-partum day #2  Normal spontaneous vaginal delivery  L&D complications: None      Doing well.  No excessive bleeding  Pain well-controlled.      Plan:   Discharge later today  Follow up 6 weeks           Interval History:   Doing well.  Pain is well-controlled.  No fevers.  No history of foul-smelling vaginal discharge.  Good appetite.  Denies chest pain, shortness of breath, nausea or vomiting.  Vaginal bleeding is similar to a heavy menstrual flow.  Ambulatory.            Significant Problems:      Patient Active Problem List   Diagnosis     Eczema, dyshidrotic     Constipation     Migraine     Gastroesophageal reflux disease without esophagitis     Encounter for supervision of normal pregnancy     Indication for care in labor or delivery     Labor and delivery, indication for care                   Physical Exam:     All vitals stable  Patient Vitals for the past 12 hrs:   BP Temp Temp src Pulse Resp   10/13/18 0100 131/74 99.1  F (37.3  C) Oral 75 16     Uterine fundus is firm, non-tender and at the level of the umbilicus  Ext NT     Shawn Rice MD  10/13/2018 8:42 AM

## 2018-10-15 NOTE — TELEPHONE ENCOUNTER
Pt notified that forms were completed and at the [facility] OB clinic for .  Margaret Garcia ma

## 2018-10-17 ENCOUNTER — ALLIED HEALTH/NURSE VISIT (OUTPATIENT)
Dept: NURSING | Facility: CLINIC | Age: 28
End: 2018-10-17
Payer: COMMERCIAL

## 2018-10-17 ENCOUNTER — HOSPITAL ENCOUNTER (OUTPATIENT)
Dept: CT IMAGING | Facility: CLINIC | Age: 28
Discharge: HOME | End: 2018-10-17
Attending: OBSTETRICS & GYNECOLOGY | Admitting: OBSTETRICS & GYNECOLOGY
Payer: COMMERCIAL

## 2018-10-17 ENCOUNTER — OFFICE VISIT (OUTPATIENT)
Dept: OBGYN | Facility: CLINIC | Age: 28
End: 2018-10-17
Payer: COMMERCIAL

## 2018-10-17 ENCOUNTER — TELEPHONE (OUTPATIENT)
Dept: OBGYN | Facility: CLINIC | Age: 28
End: 2018-10-17

## 2018-10-17 VITALS
BODY MASS INDEX: 29.26 KG/M2 | SYSTOLIC BLOOD PRESSURE: 130 MMHG | HEART RATE: 60 BPM | DIASTOLIC BLOOD PRESSURE: 76 MMHG | WEIGHT: 160 LBS

## 2018-10-17 VITALS — SYSTOLIC BLOOD PRESSURE: 118 MMHG | DIASTOLIC BLOOD PRESSURE: 64 MMHG

## 2018-10-17 DIAGNOSIS — R51.9 HEADACHE IN PREGNANCY, POSTPARTUM: ICD-10-CM

## 2018-10-17 DIAGNOSIS — Z01.30 BLOOD PRESSURE CHECK: Primary | ICD-10-CM

## 2018-10-17 DIAGNOSIS — R51.9 HEADACHE IN PREGNANCY, POSTPARTUM: Primary | ICD-10-CM

## 2018-10-17 LAB
ALT SERPL W P-5'-P-CCNC: 18 U/L (ref 0–50)
AST SERPL W P-5'-P-CCNC: 24 U/L (ref 0–45)
BASOPHILS # BLD AUTO: 0 10E9/L (ref 0–0.2)
BASOPHILS NFR BLD AUTO: 0.3 %
BUN SERPL-MCNC: 23 MG/DL (ref 7–30)
CREAT SERPL-MCNC: 1.1 MG/DL (ref 0.52–1.04)
DIFFERENTIAL METHOD BLD: ABNORMAL
EOSINOPHIL # BLD AUTO: 0.1 10E9/L (ref 0–0.7)
EOSINOPHIL NFR BLD AUTO: 1.1 %
ERYTHROCYTE [DISTWIDTH] IN BLOOD BY AUTOMATED COUNT: 12.4 % (ref 10–15)
GFR SERPL CREATININE-BSD FRML MDRD: 58 ML/MIN/1.7M2
HCT VFR BLD AUTO: 31.7 % (ref 35–47)
HGB BLD-MCNC: 10.3 G/DL (ref 11.7–15.7)
LYMPHOCYTES # BLD AUTO: 1.8 10E9/L (ref 0.8–5.3)
LYMPHOCYTES NFR BLD AUTO: 24.1 %
MCH RBC QN AUTO: 30.9 PG (ref 26.5–33)
MCHC RBC AUTO-ENTMCNC: 32.5 G/DL (ref 31.5–36.5)
MCV RBC AUTO: 95 FL (ref 78–100)
MONOCYTES # BLD AUTO: 0.5 10E9/L (ref 0–1.3)
MONOCYTES NFR BLD AUTO: 7 %
NEUTROPHILS # BLD AUTO: 5 10E9/L (ref 1.6–8.3)
NEUTROPHILS NFR BLD AUTO: 67.5 %
PLATELET # BLD AUTO: 261 10E9/L (ref 150–450)
RBC # BLD AUTO: 3.33 10E12/L (ref 3.8–5.2)
WBC # BLD AUTO: 7.3 10E9/L (ref 4–11)

## 2018-10-17 PROCEDURE — 82565 ASSAY OF CREATININE: CPT | Performed by: OBSTETRICS & GYNECOLOGY

## 2018-10-17 PROCEDURE — 84460 ALANINE AMINO (ALT) (SGPT): CPT | Performed by: OBSTETRICS & GYNECOLOGY

## 2018-10-17 PROCEDURE — 84450 TRANSFERASE (AST) (SGOT): CPT | Performed by: OBSTETRICS & GYNECOLOGY

## 2018-10-17 PROCEDURE — 84520 ASSAY OF UREA NITROGEN: CPT | Performed by: OBSTETRICS & GYNECOLOGY

## 2018-10-17 PROCEDURE — 99207 ZZC NO CHARGE NURSE ONLY: CPT

## 2018-10-17 PROCEDURE — 84550 ASSAY OF BLOOD/URIC ACID: CPT | Performed by: OBSTETRICS & GYNECOLOGY

## 2018-10-17 PROCEDURE — 70450 CT HEAD/BRAIN W/O DYE: CPT

## 2018-10-17 PROCEDURE — 99213 OFFICE O/P EST LOW 20 MIN: CPT | Mod: 24 | Performed by: OBSTETRICS & GYNECOLOGY

## 2018-10-17 PROCEDURE — 36415 COLL VENOUS BLD VENIPUNCTURE: CPT | Performed by: OBSTETRICS & GYNECOLOGY

## 2018-10-17 PROCEDURE — 85025 COMPLETE CBC W/AUTO DIFF WBC: CPT | Performed by: OBSTETRICS & GYNECOLOGY

## 2018-10-17 RX ORDER — OXYCODONE HYDROCHLORIDE 5 MG/1
5 TABLET ORAL EVERY 6 HOURS PRN
Qty: 20 TABLET | Refills: 0 | Status: SHIPPED | OUTPATIENT
Start: 2018-10-17 | End: 2020-08-05

## 2018-10-17 NOTE — TELEPHONE ENCOUNTER
Pt's  calling. The patient delivered her baby on 10/11. He says she developed a severe headache yesterday and it is not getting better. Bilateral swelling in legs. No pain or warm spots in her legs. Has been taking 600mg ibuprofen every 8 hours. Rates pain 8/10, not improving but not getting worse. No history of high blood pressure. No upper abdominal pain. No vision changes. Instructed patient to come in for BP check to r/o postpartum hypertension/pre-eclampsia. Please advise next steps for patient.        Gertrude Golden RN

## 2018-10-17 NOTE — MR AVS SNAPSHOT
After Visit Summary   10/17/2018    Olimpia Bhakta    MRN: [id number]           Patient Information     Date Of Birth          1990        Visit Information        Provider Department      10/17/2018 3:30 PM Aubrey Houser MD [facility]        Today's Diagnoses     Headache in pregnancy, postpartum    -  1       Follow-ups after your visit        Your next 10 appointments already scheduled     Nov 19, 2018 10:00 AM CST   Post Partum with Aubrey Houser MD   [facility] ([facility])    [address]  [address]  [facility] [id number] [phone]              Future tests that were ordered for you today     Open Future Orders        Priority Expected Expires Ordered    CT Head w/o Contrast STAT  10/17/2019 10/17/2018            Who to contact     If you have questions or need follow up information about today's clinic visit or your schedule please contact [facility] directly at [phone].  Normal or non-critical lab and imaging results will be communicated to you by Moodswiinghart, letter or phone within 4 business days after the clinic has received the results. If you do not hear from us within 7 days, please contact the clinic through [email or web address] or phone. If you have a critical or abnormal lab result, we will notify you by phone as soon as possible.  Submit refill requests through [email or web address] or call your pharmacy and they will forward the refill request to us. Please allow 3 business days for your refill to be completed.          Additional Information About Your Visit        Moodswiinghart Information     [email or web address] gives you secure access to your electronic health record. If you see a primary care provider, you can also send messages to your care team and make appointments. If you have questions, please call your primary care clinic.  If you do not have a primary care provider, please call [phone] and they will assist you.         Care EveryWhere ID     This is your Care EveryWhere ID. This could be used by other organizations to access your [place] medical records  [id number]        Your Vitals Were     Pulse Last Period Breastfeeding? BMI (Body Mass Index)          60 01/14/2018 (Exact Date) Yes 29.26 kg/m2         Blood Pressure from Last 3 Encounters:   10/17/18 130/76   10/17/18 118/64   10/13/18 113/63    Weight from Last 3 Encounters:   10/17/18 160 lb (72.6 kg)   10/08/18 169 lb (76.7 kg)   10/01/18 166 lb (75.3 kg)              We Performed the Following     ALT     AST     CBC with platelets differential     Creatinine     Urea nitrogen     Uric acid          Today's Medication Changes          These changes are accurate as of 10/17/18  4:05 PM.  If you have any questions, ask your nurse or doctor.               Start taking these medicines.        Dose/Directions    oxyCODONE IR 5 MG tablet   Commonly known as:  ROXICODONE   Used for:  Headache in pregnancy, postpartum   Started by:  Aubrey Houser MD        Dose:  5 mg   Take 1 tablet (5 mg) by mouth every 6 hours as needed for pain   Quantity:  20 tablet   Refills:  0            Where to get your medicines      Some of these will need a paper prescription and others can be bought over the counter.  Ask your nurse if you have questions.     Bring a paper prescription for each of these medications     oxyCODONE IR 5 MG tablet               Information about OPIOIDS     PRESCRIPTION OPIOIDS: WHAT YOU NEED TO KNOW   We gave you an opioid (narcotic) pain medicine. It is important to manage your pain, but opioids are not always the best choice. You should first try all the other options your care team gave you. Take this medicine for as short a time (and as few doses) as possible.    Some activities can increase your pain, such as bandage changes or therapy sessions. It may help to take your pain medicine 30 to 60 minutes before these activities. Reduce your stress by  getting enough sleep, working on hobbies you enjoy and practicing relaxation or meditation. Talk to your care team about ways to manage your pain beyond prescription opioids.    These medicines have risks:    DO NOT drive when on new or higher doses of pain medicine. These medicines can affect your alertness and reaction times, and you could be arrested for driving under the influence (DUI). If you need to use opioids long-term, talk to your care team about driving.    DO NOT operate heavy machinery    DO NOT do any other dangerous activities while taking these medicines.    DO NOT drink any alcohol while taking these medicines.     If the opioid prescribed includes acetaminophen, DO NOT take with any other medicines that contain acetaminophen. Read all labels carefully. Look for the word  acetaminophen  or  Tylenol.  Ask your pharmacist if you have questions or are unsure.    You can get addicted to pain medicines, especially if you have a history of addiction (chemical, alcohol or substance dependence). Talk to your care team about ways to reduce this risk.    All opioids tend to cause constipation. Drink plenty of water and eat foods that have a lot of fiber, such as fruits, vegetables, prune juice, apple juice and high-fiber cereal. Take a laxative (Miralax, milk of magnesia, Colace, Senna) if you don t move your bowels at least every other day. Other side effects include upset stomach, sleepiness, dizziness, throwing up, tolerance (needing more of the medicine to have the same effect), physical dependence and slowed breathing.    Store your pills in a secure place, locked if possible. We will not replace any lost or stolen medicine. If you don t finish your medicine, please throw away (dispose) as directed by your pharmacist. The [place] [employer]ution [employer] has more information about safe disposal: https://www.pca.[facility].[place].us/living-green/managing-unwanted-medications         Primary Care Provider Office  Phone # Fax #    Aubrey Houser [phone]       [address] [facility]  [address]  [facility] [address]        Equal Access to Services     DARRON COOK : Alexandr meggan mccord roland Sosamantha, waaxda luqadaha, qaybta kaalmada pastor, chey rios laJamesonalvaro morgan. So [facility] [phone].    ATENCIÓN: Si habla español, tiene a webber disposición servicios gratuitos de asistencia lingüística. Llame al [phone].    We comply with applicable federal civil rights laws and [place] laws. We do not discriminate on the basis of race, color, national origin, age, disability, sex, sexual orientation, or gender identity.            Thank you!     Thank you for choosing [facility]  for your care. Our goal is always to provide you with excellent care. Hearing back from our patients is one way we can continue to improve our services. Please take a few minutes to complete the written survey that you may receive in the mail after your visit with us. Thank you!             Your Updated Medication List - Protect others around you: Learn how to safely use, store and throw away your medicines at www.disposemymeds.org.          This list is accurate as of 10/17/18  4:05 PM.  Always use your most recent med list.                   Brand Name Dispense Instructions for use Diagnosis    * breast pump Misc     1 each    1 each daily    Encounter for supervision of other normal pregnancy in third trimester       * breast pump Misc     1 each    1 each as needed    Labor and delivery, indication for care       CHILDRENS CHEWABLE VITAMINS PO           ferrous sulfate 325 (65 Fe) MG tablet    IRON    90 tablet    Take 1 tablet (325 mg) by mouth daily (with breakfast)    Encounter for supervision of other normal pregnancy in third trimester, Anemia       ibuprofen 600 MG tablet    ADVIL/MOTRIN    60 tablet    Take 1 tablet (600 mg) by mouth every 6 hours as needed for moderate pain    Labor and delivery, indication  for care       oxyCODONE IR 5 MG tablet    ROXICODONE    20 tablet    Take 1 tablet (5 mg) by mouth every 6 hours as needed for pain    Headache in pregnancy, postpartum       triamcinolone 0.1 % ointment    KENALOG    80 g    Apply sparingly to affected area three times daily for 14 days.    Vaginitis and vulvovaginitis       * Notice:  This list has 2 medication(s) that are the same as other medications prescribed for you. Read the directions carefully, and ask your doctor or other care provider to review them with you.

## 2018-10-17 NOTE — PROGRESS NOTES
SUBJECTIVE:  Olimpia Bhakta is an 28 year old  P2 woman who presents for new onset severe HA     -onset of L sided HA last PM; no visual changes, no motor changes or weakness          8/10 on pain scale          -using ibuprofen 800 mg/every 8 hours          -never has had migraine HA or similar HA     -she is 6 days postpartum vaginal delivery; did not have pre-eclampsia during labor or prior to discharge     -does c/o swelling          Past Medical History:   Diagnosis Date     Constipation      Eczema, dyshidrotic      Migraine      Past Surgical History:   Procedure Laterality Date     APPENDECTOMY       Current Outpatient Prescriptions   Medication     oxyCODONE IR (ROXICODONE) 5 MG tablet     ferrous sulfate (IRON) 325 (65 Fe) MG tablet     ibuprofen (ADVIL/MOTRIN) 600 MG tablet     Misc. Devices (BREAST PUMP) MISC     Misc. Devices (BREAST PUMP) MISC     Pediatric Multiple Vit-C-FA (CHILDRENS CHEWABLE VITAMINS PO)     triamcinolone (KENALOG) 0.1 % ointment     No current facility-administered medications for this visit.      Allergies   Allergen Reactions     Compazine [Prochlorperazine] Swelling     Swelling of tongue     Zantac [Ranitidine] Other (See Comments)     IV Zantac caused : Anxiety     Social History   Substance Use Topics     Smoking status: Never Smoker     Smokeless tobacco: Never Used     Alcohol use No       Review of Systems  CONSTITUTIONAL:NEGATIVE  EYES: NEGATIVE  : NEGATIVE  NEURO: see above    OBJECTIVE:  /76 (BP Location: Right arm, Patient Position: Chair, Cuff Size: Adult Regular)  Pulse 60  Wt 160 lb (72.6 kg)  LMP 2018 (Exact Date)  Breastfeeding? Yes  BMI 29.26 kg/m2   EXAM:  GENERAL APPEARANCE: healthy, alert and no distress  ABDOMEN: uterus at umbilicus  EOM; full  PERRLA  Upper body strength equal and symmetric  MSR: 1+; no clonus  One plus pedal edema    ASSESSMENT:  New onset HA     -evaluate for PIH and intracranial lesion    PLAN:  (O90.89,   R51) Headache in pregnancy, postpartum  (primary encounter diagnosis)  Plan: oxyCODONE IR (ROXICODONE) 5 MG tablet, CT Head         w/o Contrast, ALT, AST, Creatinine, Uric acid,         CBC with platelets differential, Urea nitrogen                Health Maintenance   Topic Date Due     MIGRAINE ACTION PLAN  07/08/2006     PHQ-2 Q1 YR  09/02/2017     PAP Q3 YR  01/22/2019     ADVANCE DIRECTIVE PLANNING Q5 YRS  10/11/2023     TETANUS IMMUNIZATION (SYSTEM ASSIGNED)  10/12/2028     HIV SCREEN (SYSTEM ASSIGNED)  Completed     INFLUENZA VACCINE  Completed

## 2018-10-18 LAB — URATE SERPL-MCNC: 4.7 MG/DL (ref 2.6–6)

## 2018-11-21 ENCOUNTER — PRENATAL OFFICE VISIT (OUTPATIENT)
Dept: OBGYN | Facility: CLINIC | Age: 28
End: 2018-11-21
Payer: COMMERCIAL

## 2018-11-21 VITALS
WEIGHT: 157 LBS | SYSTOLIC BLOOD PRESSURE: 120 MMHG | HEART RATE: 64 BPM | DIASTOLIC BLOOD PRESSURE: 70 MMHG | BODY MASS INDEX: 28.72 KG/M2

## 2018-11-21 DIAGNOSIS — Z01.419 ENCOUNTER FOR GYNECOLOGICAL EXAMINATION WITHOUT ABNORMAL FINDING: Primary | ICD-10-CM

## 2018-11-21 DIAGNOSIS — Z30.011 ENCOUNTER FOR INITIAL PRESCRIPTION OF CONTRACEPTIVE PILLS: ICD-10-CM

## 2018-11-21 PROBLEM — Z34.90 ENCOUNTER FOR SUPERVISION OF NORMAL PREGNANCY: Status: RESOLVED | Noted: 2018-10-08 | Resolved: 2018-11-21

## 2018-11-21 PROCEDURE — G0476 HPV COMBO ASSAY CA SCREEN: HCPCS | Performed by: OBSTETRICS & GYNECOLOGY

## 2018-11-21 PROCEDURE — 99207 ZZC POST PARTUM EXAM: CPT | Performed by: OBSTETRICS & GYNECOLOGY

## 2018-11-21 PROCEDURE — G0145 SCR C/V CYTO,THINLAYER,RESCR: HCPCS | Performed by: OBSTETRICS & GYNECOLOGY

## 2018-11-21 RX ORDER — OMEGA-3 FATTY ACIDS/FISH OIL 300-1000MG
200 CAPSULE ORAL EVERY 4 HOURS PRN
Qty: 120 CAPSULE | COMMUNITY
Start: 2018-11-21 | End: 2020-08-05

## 2018-11-21 RX ORDER — ACETAMINOPHEN AND CODEINE PHOSPHATE 120; 12 MG/5ML; MG/5ML
0.35 SOLUTION ORAL DAILY
Qty: 84 TABLET | Refills: 3 | Status: SHIPPED | OUTPATIENT
Start: 2018-11-21 | End: 2020-08-05

## 2018-11-21 ASSESSMENT — PATIENT HEALTH QUESTIONNAIRE - PHQ9: SUM OF ALL RESPONSES TO PHQ QUESTIONS 1-9: 3

## 2018-11-21 NOTE — NURSING NOTE
"Chief Complaint   Patient presents with     Post Partum Exam       Initial /70 (BP Location: Right arm, Patient Position: Chair, Cuff Size: Adult Regular)  Pulse 64  Wt 157 lb (71.2 kg)  LMP 2018 (Exact Date)  BMI 28.72 kg/m2 Estimated body mass index is 28.72 kg/(m^2) as calculated from the following:    Height as of 10/10/18: 5' 2\" (1.575 m).    Weight as of this encounter: 157 lb (71.2 kg).  BP completed using cuff size: regular    Questioned patient about current smoking habits.  Pt. has never smoked.          The following HM Due: NONE      Nurse assisted visit.  Margaret Garcia MA.               "

## 2018-11-21 NOTE — PROGRESS NOTES
SUBJECTIVE: Olimpia is here for a 6-week postpartum checkup.    Delivery date was 10/11/18. She had a  of a viable boy, weight 8 pounds 5.7 oz., with Breast fed complications.  Since delivery, she has been breast feeding.  She has no signs of infection, bleeding or other complications.  She is not pregnant.  We discussed contraceptions and she has chosen unsure.  She  has not had intercourse since delivery and complains of No discomfort. Patient screened for postpartum depression and complaints are NEGATIVE. Screening has also been completed for intimate partner violence.    EXAM:  Today's Depression Rating was   PHQ-9 SCORE 2018   Total Score 3     Nurse assisted visit.  Margaret Garcia MA.    GENERAL healthy, alert and no distress  GI: NEGATIVE  : NEGATIVE  PSYCH: NEGATIVE    ASSESSMENT:   Normal postpartum exam after .    PLAN:  Return as needed or at time of next expected pap, pelvic, or breast exam.            SUBJECTIVE:  Olimpia Bhakta is an 28 year old G:3 P2 woman who presents for PP check and pap smear        Past Medical History:   Diagnosis Date     Constipation      Eczema, dyshidrotic      Migraine      Past Surgical History:   Procedure Laterality Date     APPENDECTOMY       Current Outpatient Prescriptions   Medication     ferrous sulfate (IRON) 325 (65 Fe) MG tablet     ibuprofen (ADVIL/MOTRIN) 200 MG capsule     Pediatric Multiple Vit-C-FA (CHILDRENS CHEWABLE VITAMINS PO)     ibuprofen (ADVIL/MOTRIN) 600 MG tablet     Misc. Devices (BREAST PUMP) MISC     Misc. Devices (BREAST PUMP) MISC     oxyCODONE IR (ROXICODONE) 5 MG tablet     triamcinolone (KENALOG) 0.1 % ointment     No current facility-administered medications for this visit.      Allergies   Allergen Reactions     Compazine [Prochlorperazine] Swelling     Swelling of tongue     Zantac [Ranitidine] Other (See Comments)     IV Zantac caused : Anxiety     Social History   Substance Use Topics     Smoking status: Never  Smoker     Smokeless tobacco: Never Used     Alcohol use No       Review of Systems  CONSTITUTIONAL:NEGATIVE  EYES: NEGATIVE  ENT/MOUTH: NEGATIVE  RESP: NEGATIVE  CV: NEGATIVE  GI: NEGATIVE  : NEGATIVE  MUSCULOSKELATAL: NEGATIVE  INTEGUMENTARY/SKIN: NEGATIVE  BREAST: NEGATIVE  NEURO: NEGATIVE  ENDOCRINE: NEGATIVE  HEME/ALLERGY/IMMUNE: NEGATIVE  PSYCHIATRIC: NEGATIVE    OBJECTIVE:  /70 (BP Location: Right arm, Patient Position: Chair, Cuff Size: Adult Regular)  Pulse 64  Wt 157 lb (71.2 kg)  LMP 01/14/2018 (Exact Date)  BMI 28.72 kg/m2   EXAM:  GENERAL APPEARANCE: healthy, alert and no distress  ABDOMEN: soft, nontender, without hepatosplenomegaly or masses    Pelvic Exam:  Vulva: No external lesions, normal hair distribution, no adenopathy  Vagina: Moist, pink, no abnormal discharge, well rugated, no lesions  Cervix: Pap smear is taken, parous, smooth, pink, no visible lesions  Uterus: Normal size, anteverted, non-tender, mobile  Ovaries: No mass, non-tender, mobile      ASSESSMENT:  Satisfactory annual gyn exam      PLAN:  Pap and PP check      PE: reviewed health maintenance including diet, regular exercise and periodic exams.  Health Maintenance   Topic Date Due     MIGRAINE ACTION PLAN  07/08/2006     PHQ-2 Q1 YR  09/02/2017     PAP Q3 YR  01/22/2019     ADVANCE DIRECTIVE PLANNING Q5 YRS  10/11/2023     TETANUS IMMUNIZATION (SYSTEM ASSIGNED)  10/12/2028     HIV SCREEN (SYSTEM ASSIGNED)  Completed     INFLUENZA VACCINE  Completed

## 2018-11-21 NOTE — MR AVS SNAPSHOT
After Visit Summary   11/21/2018    Olimpia Bhakta    MRN: [id number]           Patient Information     Date Of Birth          1990        Visit Information        Provider Department      11/21/2018 3:00 PM Aubrey Houser MD [facility]        Today's Diagnoses     Encounter for gynecological examination without abnormal finding    -  1    Routine postpartum follow-up        Encounter for initial prescription of contraceptive pills           Follow-ups after your visit        Follow-up notes from your care team     Return in about 1 year (around 11/21/2019).      Who to contact     If you have questions or need follow up information about today's clinic visit or your schedule please contact [facility] directly at [phone].  Normal or non-critical lab and imaging results will be communicated to you by H2scanhart, letter or phone within 4 business days after the clinic has received the results. If you do not hear from us within 7 days, please contact the clinic through H2scanhart or phone. If you have a critical or abnormal lab result, we will notify you by phone as soon as possible.  Submit refill requests through PingMD or call your pharmacy and they will forward the refill request to us. Please allow 3 business days for your refill to be completed.          Additional Information About Your Visit        MyChart Information     PingMD gives you secure access to your electronic health record. If you see a primary care provider, you can also send messages to your care team and make appointments. If you have questions, please call your primary care clinic.  If you do not have a primary care provider, please call [phone] and they will assist you.        Care EveryWhere ID     This is your Care EveryWhere ID. This could be used by other organizations to access your [place] medical records  [id number]        Your Vitals Were     Pulse Last Period BMI (Body Mass  Index)             64 01/14/2018 (Exact Date) 28.72 kg/m2          Blood Pressure from Last 3 Encounters:   11/21/18 120/70   10/17/18 130/76   10/17/18 118/64    Weight from Last 3 Encounters:   11/21/18 157 lb (71.2 kg)   10/17/18 160 lb (72.6 kg)   10/08/18 169 lb (76.7 kg)              Today, you had the following     No orders found for display         Today's Medication Changes          These changes are accurate as of 11/21/18  3:25 PM.  If you have any questions, ask your nurse or doctor.               Start taking these medicines.        Dose/Directions    norethindrone 0.35 MG per tablet   Commonly known as:  MICRONOR   Used for:  Encounter for initial prescription of contraceptive pills        Dose:  0.35 mg   Take 1 tablet (0.35 mg) by mouth daily   Quantity:  84 tablet   Refills:  3            Where to get your medicines      These medications were sent to [facility] [address] [place] [address]     Phone:  [phone]     norethindrone 0.35 MG per tablet                Primary Care Provider Office Phone # Fax #    Aubrey Houser [phone]       [address] [address]        Equal Access to Services     DARRON COOK AH: Hadii emggan mccord hadasho Soomaali, waaxda luqadaha, qaybta kaalmada pastor, chey morgan. So [facility] [phone].    ATENCIÓN: Si habla español, tiene a webber disposición servicios gratuitos de asistencia lingüística. Tim al [phone].    We comply with applicable federal civil rights laws and [place] laws. We do not discriminate on the basis of race, color, national origin, age, disability, sex, sexual orientation, or gender identity.            Thank you!     Thank you for choosing [facility]  for your care. Our goal is always to provide you with excellent care. Hearing back from our patients is one  way we can continue to improve our services. Please take a few minutes to complete the written survey that you may receive in the mail after your visit with us. Thank you!             Your Updated Medication List - Protect others around you: Learn how to safely use, store and throw away your medicines at www.disposemymeds.org.          This list is accurate as of 11/21/18  3:25 PM.  Always use your most recent med list.                   Brand Name Dispense Instructions for use Diagnosis    * breast pump Misc     1 each    1 each daily    Encounter for supervision of other normal pregnancy in third trimester       * breast pump Misc     1 each    1 each as needed    Labor and delivery, indication for care       CHILDRENS CHEWABLE VITAMINS PO           ferrous sulfate 325 (65 Fe) MG tablet    IRON    90 tablet    Take 1 tablet (325 mg) by mouth daily (with breakfast)    Encounter for supervision of other normal pregnancy in third trimester, Anemia       * ibuprofen 600 MG tablet    ADVIL/MOTRIN    60 tablet    Take 1 tablet (600 mg) by mouth every 6 hours as needed for moderate pain    Labor and delivery, indication for care       * ibuprofen 200 MG capsule    ADVIL/MOTRIN    120 capsule    Take 1 capsule (200 mg) by mouth every 4 hours as needed for fever        norethindrone 0.35 MG per tablet    MICRONOR    84 tablet    Take 1 tablet (0.35 mg) by mouth daily    Encounter for initial prescription of contraceptive pills       oxyCODONE 5 MG tablet    ROXICODONE    20 tablet    Take 1 tablet (5 mg) by mouth every 6 hours as needed for pain    Headache in pregnancy, postpartum       triamcinolone 0.1 % ointment    KENALOG    80 g    Apply sparingly to affected area three times daily for 14 days.    Vaginitis and vulvovaginitis       * Notice:  This list has 4 medication(s) that are the same as other medications prescribed for you. Read the directions carefully, and ask your doctor or other care provider to review them  with you.

## 2018-11-21 NOTE — LETTER
December 4, 2018    Olimpia Bhakta  [address]  [place] [address]    Dear Olimpia,  We are happy to inform you that your PAP smear result from 11/21/18 is normal.  We are now able to do a follow up test on PAP smears. The DNA test is for HPV (Human Papilloma Virus). Cervical cancer is closely linked with certain types of HPV. Your results showed no evidence of high risk HPV.  Therefore we recommend you return in 5 years for your next pap smear and HPV test.  You will still need to return to the clinic every year for an annual exam and other preventive tests.  If you have additional questions regarding this result, please call our registered nurse, Guillermina at [phone].  Sincerely,    Aubrey Houser MD/[facility]

## 2018-11-26 LAB
COPATH REPORT: NORMAL
PAP: NORMAL

## 2018-11-28 LAB
FINAL DIAGNOSIS: NORMAL
HPV HR 12 DNA CVX QL NAA+PROBE: NEGATIVE
HPV16 DNA SPEC QL NAA+PROBE: NEGATIVE
HPV18 DNA SPEC QL NAA+PROBE: NEGATIVE
SPECIMEN DESCRIPTION: NORMAL
SPECIMEN SOURCE CVX/VAG CYTO: NORMAL

## 2018-12-08 NOTE — PROGRESS NOTES
Patient did not answer the door when Public Health Nurse attempted to stop by patient's room to discuss resources within [facility].   Other

## 2020-03-02 ENCOUNTER — HEALTH MAINTENANCE LETTER (OUTPATIENT)
Age: 30
End: 2020-03-02

## 2020-08-05 ENCOUNTER — OFFICE VISIT (OUTPATIENT)
Dept: INTERNAL MEDICINE | Facility: CLINIC | Age: 30
End: 2020-08-05
Payer: COMMERCIAL

## 2020-08-05 VITALS
HEIGHT: 65 IN | WEIGHT: 146.6 LBS | OXYGEN SATURATION: 100 % | SYSTOLIC BLOOD PRESSURE: 110 MMHG | RESPIRATION RATE: 15 BRPM | HEART RATE: 55 BPM | DIASTOLIC BLOOD PRESSURE: 70 MMHG | BODY MASS INDEX: 24.43 KG/M2 | TEMPERATURE: 97.3 F

## 2020-08-05 DIAGNOSIS — J39.2 THROAT DISORDER: Primary | ICD-10-CM

## 2020-08-05 DIAGNOSIS — Z13.6 CARDIOVASCULAR SCREENING; LDL GOAL LESS THAN 160: ICD-10-CM

## 2020-08-05 LAB
ALBUMIN SERPL-MCNC: 3.8 G/DL (ref 3.4–5)
ALP SERPL-CCNC: 62 U/L (ref 40–150)
ALT SERPL W P-5'-P-CCNC: 20 U/L (ref 0–50)
ANION GAP SERPL CALCULATED.3IONS-SCNC: 4 MMOL/L (ref 3–14)
AST SERPL W P-5'-P-CCNC: 14 U/L (ref 0–45)
BILIRUB SERPL-MCNC: 0.4 MG/DL (ref 0.2–1.3)
BUN SERPL-MCNC: 8 MG/DL (ref 7–30)
CALCIUM SERPL-MCNC: 8.6 MG/DL (ref 8.5–10.1)
CHLORIDE SERPL-SCNC: 108 MMOL/L (ref 94–109)
CHOLEST SERPL-MCNC: 107 MG/DL
CO2 SERPL-SCNC: 28 MMOL/L (ref 20–32)
CREAT SERPL-MCNC: 0.83 MG/DL (ref 0.52–1.04)
GFR SERPL CREATININE-BSD FRML MDRD: >90 ML/MIN/{1.73_M2}
GLUCOSE SERPL-MCNC: 74 MG/DL (ref 70–99)
HDLC SERPL-MCNC: 62 MG/DL
LDLC SERPL CALC-MCNC: 36 MG/DL
NONHDLC SERPL-MCNC: 45 MG/DL
POTASSIUM SERPL-SCNC: 4 MMOL/L (ref 3.4–5.3)
PROT SERPL-MCNC: 7.8 G/DL (ref 6.8–8.8)
SODIUM SERPL-SCNC: 140 MMOL/L (ref 133–144)
TRIGL SERPL-MCNC: 44 MG/DL
TSH SERPL DL<=0.005 MIU/L-ACNC: 0.8 MU/L (ref 0.4–4)

## 2020-08-05 PROCEDURE — 36415 COLL VENOUS BLD VENIPUNCTURE: CPT | Performed by: INTERNAL MEDICINE

## 2020-08-05 PROCEDURE — 84443 ASSAY THYROID STIM HORMONE: CPT | Performed by: INTERNAL MEDICINE

## 2020-08-05 PROCEDURE — 80061 LIPID PANEL: CPT | Performed by: INTERNAL MEDICINE

## 2020-08-05 PROCEDURE — 80053 COMPREHEN METABOLIC PANEL: CPT | Performed by: INTERNAL MEDICINE

## 2020-08-05 PROCEDURE — 99203 OFFICE O/P NEW LOW 30 MIN: CPT | Performed by: INTERNAL MEDICINE

## 2020-08-05 RX ORDER — CYCLOBENZAPRINE HCL 10 MG
10 TABLET ORAL 3 TIMES DAILY PRN
Qty: 21 TABLET | Refills: 0 | Status: CANCELLED | OUTPATIENT
Start: 2020-08-05

## 2020-08-05 RX ORDER — FAMOTIDINE 40 MG/1
40 TABLET, FILM COATED ORAL DAILY
Qty: 30 TABLET | Refills: 0 | Status: SHIPPED | OUTPATIENT
Start: 2020-08-05 | End: 2020-11-17

## 2020-08-05 ASSESSMENT — MIFFLIN-ST. JEOR: SCORE: 1375.85

## 2020-08-05 NOTE — PROGRESS NOTES
Subjective     Olimpia Bhakta is a 30 year old female who presents to clinic today for the following health issues:    HPI     New patient to me in clinic.    Patient states that she has had some atypical symptoms to her throat which is been going on for about 4 years.  The symptoms started after the birth of her child at that time.  At that time her obstetrician gave her some Zantac which she took and felt slightly improved her symptoms.  She apparently has not been on that medication for quite some time.  She gets no nausea or vomiting but does report a slight burning sensation at times, particularly with eating sweets.  She is currently not pregnant.  She reports that she has had an intolerance to Zantac but that was only in IV form and she has taken it orally without issue.  She reports no classic biliary colic-like symptoms.  She denies any thyroid disorder.  She also reports no dysphagia with solids or liquids.    Throat problem       Duration: 1 week     Description (location/character/radiation): pain in the front side of neck with swallowing, turning     Intensity:  mild    Accompanying signs and symptoms: burning    History (similar episodes/previous evaluation): Patient states she had throat irritation after excessive vomiting with her first pregnancy 4 years ago- has had pain on and off since then     Precipitating or alleviating factors: Worse after eating sweets     Therapies tried and outcome: Zantac- helped in the past         Patient Active Problem List   Diagnosis     Eczema, dyshidrotic     Constipation     Migraine     Gastroesophageal reflux disease without esophagitis     Past Surgical History:   Procedure Laterality Date     APPENDECTOMY         Social History     Tobacco Use     Smoking status: Never Smoker     Smokeless tobacco: Never Used   Substance Use Topics     Alcohol use: No     Alcohol/week: 0.0 standard drinks     Family History   Problem Relation Age of Onset      "Hypertension Mother          Current Outpatient Medications   Medication Sig Dispense Refill     famotidine (PEPCID) 40 MG tablet Take 1 tablet (40 mg) by mouth daily 30 tablet 0     Prenatal Vit-Fe Fumarate-FA (PRENATAL 19 PO)        Allergies   Allergen Reactions     Compazine [Prochlorperazine] Swelling     Swelling of tongue     Zantac [Ranitidine] Other (See Comments)     IV Zantac caused : Anxiety     BP Readings from Last 3 Encounters:   11/21/18 120/70   10/17/18 130/76   10/17/18 118/64    Wt Readings from Last 3 Encounters:   11/21/18 71.2 kg (157 lb)   10/17/18 72.6 kg (160 lb)   10/08/18 76.7 kg (169 lb)           Reviewed and updated as needed this visit by Provider         Review of Systems   CONSTITUTIONAL: NEGATIVE for fever, chills, change in weight  EYES: NEGATIVE for vision changes or irritation  ENT/MOUTH: NEGATIVE for ear, mouth and throat problems  RESP: NEGATIVE for significant cough or SOB  CV: NEGATIVE for chest pain, palpitations or peripheral edema  GI: NEGATIVE for nausea, abdominal pain, heartburn, or change in bowel habits  : NEGATIVE for frequency, dysuria, or hematuria  NEURO: NEGATIVE for weakness, dizziness or paresthesias  HEME: NEGATIVE for bleeding problems  PSYCHIATRIC: NEGATIVE for changes in mood or affect      Objective    /70   Pulse 55   Temp 97.3  F (36.3  C) (Temporal)   Resp 15   Ht 1.651 m (5' 5\")   Wt 66.5 kg (146 lb 9.6 oz)   LMP 07/24/2020   SpO2 100%   BMI 24.40 kg/m    Body mass index is 24.4 kg/m .  Physical Exam   GENERAL: healthy, alert and no distress  EYES: Eyes grossly normal to inspection, PERRL and conjunctivae and sclerae normal  HENT: ear canals and TM's normal, nose and mouth without ulcers or lesions  NECK: no adenopathy, no asymmetry, masses, or scars and thyroid normal to palpation without obvious nodular change although may be just slightly prominent.    RESP: lungs clear to auscultation - no rales, rhonchi or wheezes  CV: regular " rate and rhythm, normal S1 S2, no S3 or S4, no click or rub, no peripheral edema and peripheral pulses strong  MS: no gross musculoskeletal defects noted, no edema  NEURO: Normal strength and tone, mentation intact and speech normal  PSYCH: mentation appears normal, affect normal/bright        Assessment & Plan     1. Throat disorder  Suggest thyroid function profile and ultrasound for reassurance with trial of Pepcid 40 mg daily in substitution for Zantac.  If no response may need ENT assessment.  - TSH with free T4 reflex  - US Thyroid; Future  - OTOLARYNGOLOGY REFERRAL  - famotidine (PEPCID) 40 MG tablet; Take 1 tablet (40 mg) by mouth daily  Dispense: 30 tablet; Refill: 0    2. CARDIOVASCULAR SCREENING; LDL GOAL LESS THAN 160  Discussed with patient need to update labs and she has been fasting today thus we will do today.  - Lipid Profile  - Comprehensive metabolic panel     See Patient Instructions and she will follow-up if her symptoms do not improve and once test results are available    Return for if symptoms recur or worsen, f/u with routine sub-specialist, diagnostic test as ordered.    Rodger Encarnacion MD  [facility]

## 2020-08-07 ENCOUNTER — HOSPITAL ENCOUNTER (OUTPATIENT)
Dept: ULTRASOUND IMAGING | Facility: CLINIC | Age: 30
Discharge: HOME | End: 2020-08-07
Attending: INTERNAL MEDICINE | Admitting: INTERNAL MEDICINE
Payer: COMMERCIAL

## 2020-08-07 DIAGNOSIS — J39.2 THROAT DISORDER: ICD-10-CM

## 2020-08-07 PROCEDURE — 76536 US EXAM OF HEAD AND NECK: CPT

## 2020-08-10 ENCOUNTER — TRANSFERRED RECORDS (OUTPATIENT)
Dept: HEALTH INFORMATION MANAGEMENT | Facility: CLINIC | Age: 30
End: 2020-08-10

## 2020-08-25 ENCOUNTER — HOSPITAL ENCOUNTER (OUTPATIENT)
Dept: GENERAL RADIOLOGY | Facility: CLINIC | Age: 30
Discharge: HOME | End: 2020-08-25
Attending: OTOLARYNGOLOGY | Admitting: OTOLARYNGOLOGY
Payer: COMMERCIAL

## 2020-08-25 DIAGNOSIS — K21.9 LARYNGOPHARYNGEAL REFLUX DISEASE: ICD-10-CM

## 2020-08-25 PROCEDURE — 74220 X-RAY XM ESOPHAGUS 1CNTRST: CPT

## 2020-09-02 ENCOUNTER — TRANSFERRED RECORDS (OUTPATIENT)
Dept: HEALTH INFORMATION MANAGEMENT | Facility: CLINIC | Age: 30
End: 2020-09-02

## 2020-09-25 ENCOUNTER — OFFICE VISIT (OUTPATIENT)
Dept: OBGYN | Facility: CLINIC | Age: 30
End: 2020-09-25
Payer: COMMERCIAL

## 2020-09-25 VITALS — DIASTOLIC BLOOD PRESSURE: 72 MMHG | WEIGHT: 144.7 LBS | BODY MASS INDEX: 24.08 KG/M2 | SYSTOLIC BLOOD PRESSURE: 104 MMHG

## 2020-09-25 DIAGNOSIS — B37.31 CANDIDIASIS OF VAGINA: ICD-10-CM

## 2020-09-25 DIAGNOSIS — N89.8 VAGINAL DISCHARGE: Primary | ICD-10-CM

## 2020-09-25 LAB
SPECIMEN SOURCE: ABNORMAL
WET PREP SPEC: ABNORMAL

## 2020-09-25 PROCEDURE — 87210 SMEAR WET MOUNT SALINE/INK: CPT | Performed by: ADVANCED PRACTICE MIDWIFE

## 2020-09-25 PROCEDURE — 99213 OFFICE O/P EST LOW 20 MIN: CPT | Performed by: ADVANCED PRACTICE MIDWIFE

## 2020-09-25 RX ORDER — FLUCONAZOLE 150 MG/1
150 TABLET ORAL ONCE
Qty: 1 TABLET | Refills: 0 | Status: SHIPPED | OUTPATIENT
Start: 2020-09-25 | End: 2020-09-25

## 2020-09-25 NOTE — PROGRESS NOTES
"  SUBJECTIVE:                                                   Olimpia Rivas Obniko is a 30 year old who presents to clinic today for the following health issue(s):  Patient presents with:  Vaginal Problem: vaginal discharge X  2 months white, odor some itching         HPI:  Pt reports \"milky\" vaginal discharge that has a fishy odor.   Odor is worse after intercourse.   Reports mild itching but denies burning.   Pt is in a long term monogamous relationship and declined STD screening.     Patient's last menstrual period was 2020 (exact date).  Menstrual History: frequency: every 28-30 days  Patient is sexually active  .  Using none for contraception.   Health maintenance updated:  yes  STI infx testing offered:  declined    Last PHQ-9 score on record =   PHQ-9 SCORE 2018   PHQ-9 Total Score 3     Last GAD7 score on record = No flowsheet data found.     Problem list and histories reviewed & adjusted, as indicated.  Additional history: as documented.    Patient Active Problem List   Diagnosis     Eczema, dyshidrotic     Constipation     Migraine     Gastroesophageal reflux disease without esophagitis     Past Surgical History:   Procedure Laterality Date     APPENDECTOMY        Social History     Tobacco Use     Smoking status: Never Smoker     Smokeless tobacco: Never Used   Substance Use Topics     Alcohol use: No     Alcohol/week: 0.0 standard drinks      Problem (# of Occurrences) Relation (Name,Age of Onset)    Hypertension (1) Mother            Current Outpatient Medications   Medication Sig     fluconazole (DIFLUCAN) 150 MG tablet Take 1 tablet (150 mg) by mouth once for 1 dose     Prenatal Vit-Fe Fumarate-FA (PRENATAL 19 PO)      famotidine (PEPCID) 40 MG tablet Take 1 tablet (40 mg) by mouth daily (Patient not taking: Reported on 2020)     No current facility-administered medications for this visit.      Allergies   Allergen Reactions     Compazine [Prochlorperazine] Swelling     " Swelling of tongue     Zantac [Ranitidine] Other (See Comments)     IV Zantac caused : Anxiety       ROS:  CONSTITUTIONAL: NEGATIVE for fever, chills, change in weight  INTEGUMENTARU/SKIN: NEGATIVE for worrisome rashes, moles or lesions  EYES: NEGATIVE for vision changes or irritation  ENT: NEGATIVE for ear, mouth and throat problems  RESP: NEGATIVE for significant cough or SOB  BREAST: NEGATIVE for masses, tenderness or discharge  CV: NEGATIVE for chest pain, palpitations or peripheral edema  GI: NEGATIVE for nausea, abdominal pain, heartburn, or change in bowel habits   female: vaginal discharge   MUSCULOSKELETAL: NEGATIVE for significant arthralgias or myalgia  NEURO: NEGATIVE for weakness, dizziness or paresthesias  PSYCHIATRIC: NEGATIVE for changes in mood or affect    OBJECTIVE:     /72 (BP Location: Left arm, Patient Position: Chair, Cuff Size: Adult Regular)   Wt 65.6 kg (144 lb 11.2 oz)   LMP 08/26/2020 (Exact Date)   Breastfeeding No   BMI 24.08 kg/m    Body mass index is 24.08 kg/m .    PHYSICAL EXAM:  Constitutional:  Appearance: Well nourished, well developed alert, in no acute distress  Skin: General Inspection:  No rashes present, no lesions present, no areas of discoloration.  Neurologic:  Mental Status:  Oriented X3.  Normal strength and tone, sensory exam grossly normal, mentation intact and speech normal.    Psychiatric:  Mentation appears normal and affect normal/bright.  Pelvic Exam:  External Genitalia:     Normal appearance for age, no tenderness present, no inflammatory lesions present, color normal  Vagina:     Normal vaginal vault without central or paravaginal defects, scant clumpy white vaginal discharge present, no odor noted, no inflammatory lesions present, no masses present  Cervix:     Appearance healthy, no lesions present, nontender to palpation, no bleeding present  Anus:     Anus within normal limits, no hemorrhoids present  Pubic Hair:     Normal pubic hair  distribution for age  Genitalia and Groin:     No rashes present, no lesions present, no areas of discoloration, no masses present       In-Clinic Test Results:  Results for orders placed or performed in visit on 09/25/20 (from the past 24 hour(s))   Wet prep    Specimen: Vagina   Result Value Ref Range    Specimen Description Vagina     Wet Prep No Trichomonas seen  No clue cells seen       Wet Prep Few  Yeast seen   (A)     Wet Prep No yeast seen     Wet Prep Moderate  WBC'S seen          ASSESSMENT/PLAN:                                                        ICD-10-CM    1. Vaginal discharge  N89.8 Wet prep   2. Candidiasis of vagina  B37.3 fluconazole (DIFLUCAN) 150 MG tablet       PLAN:  1. Vaginitis prevention discussed and AVS info provided  2. Wet prep shows few yeast, will treat with Diflucan x1, counseled to call if her symptoms do not improve as second dose can be repeated in 72 hrs.     Follow up if symptoms persist or worsen  CYRUS Sexton, CNM

## 2020-09-25 NOTE — PATIENT INSTRUCTIONS
Vaginitis (Vaginal Irritation/Infection)    Vaginitis is very common!  The most common vaginal infections are bacterial vaginosis or yeast. These infections are not sexually transmitted but can be incredibly uncomfortable. Seek care from your midwife if signs or symptoms arise.     Normal vaginal discharge:      Is white, clear, thick or thin (it may change depending on where you are in your cycle)    Does not have a foul odor    The amount of discharge varies    Abnormal discharge/symptoms:       Itching in and around the vagina    Redness, pain or swelling    Discharge that is foamy, greenish, curd like, or bloody    Foul smelling odor    Pain when urinating or having sex    Fever    Causes of vaginal infections:      Good bacteria from the vagina have been destroyed by bad bacteria    Reaction to something in the vagina such as a tampon or scented/perfumed soaps or bubble bath    STI's    Sensitivities to soaps/detergents/dryer sheets, lubricants, etc.    Hormonal changes    Recent use of antibiotics     Infections can also occur after you've had intercourse with a new partner or if you have had frequent intercourse         Here is a list of suggestions that may help prevent/treat vaginal infections and will help maintain a healthy vaginal environment:      1.  Boosting your immune system so you can heal faster      Make sure you are getting adequate sleep    Drink 2-3 quarts of fluids per day, Cranberries or cranberry juice (unsweetened)    Eat more nuts, grains, raw veggies, yogurt, destinee, grapefruit    Decrease intake of refined sugar, red meat and alcohol    Echinacea - 3 times a day for chronic problem or every 2 hours for acute symptoms; use as directed on bottle          2.  Changing the vaginal environment to a more acid state       Soak in a warm bath tub with one cup of vinegar or lemon juice. Do not use scented soap, bubble bath, or oils.     Acidophilus capsules:  1 in your vagina at bedtime for 5-7  nights    Herbal sitz bath or berna-wash with:  TBSP tea tree oil or 2 TBS cider vinegar      3.  Increasing the good healthy bacteria      At each meal drink 1 tsp apple cider vinegar and 1 tsp honey in   cup warm water    Eat garlic daily, capsules or fresh.      Take probiotics 4-8 billion units/day      4.  Preventive measures      Wear cotton underwear, no thongs.  Do not wear tight clothes or pantyhose    Shower soon after working or change out of sweaty clothing     Do not wear underwear to bed.  The vaginal environment needs to breathe    Never douche or use vaginal , the vagina is self-cleaning!    Use white, unscented toilet paper.  Do not use baby wipes.  Wipe from front to back    Use only unscented tampons and pads, buy organic products if desired    Do not use perfumes/oils/lotions near your vagina or take bubble baths    Use only mild, unscented soaps around your vaginal area     Do not use fabric softeners/dryer sheets    Use gentle, unscented detergent, consider buying non-petroleum based detergents    Use only water based lubricants during sexual contact    Abstain from intercourse during times of infection    Alternative Treatment  Boric acid capsules one per vagina (not by mouth!!! Very toxic if taken orally) at bedtime for 5 days (or as suggested by your provider) may be an effective alternative treatment and also more effective for those with chronic yeast vaginitis. Boric acid is available at the pharmacy but must be purchased along with gelatin caps for insertion. It might also be available at a local compounding pharmacy. Boric acid is not safe for pregnant women. Discuss with your midwife if this treatment interests you.     If your symptoms do not resolve or if you have questions please call:     [facility]  [phone]

## 2020-09-25 NOTE — NURSING NOTE
"Chief Complaint   Patient presents with     Vaginal Problem     vaginal discharge X  2 months white, odor some itching          Initial /72 (BP Location: Left arm, Patient Position: Chair, Cuff Size: Adult Regular)   Wt 65.6 kg (144 lb 11.2 oz)   LMP 2020 (Exact Date)   Breastfeeding No   BMI 24.08 kg/m   Estimated body mass index is 24.08 kg/m  as calculated from the following:    Height as of 20: 1.651 m (5' 5\").    Weight as of this encounter: 65.6 kg (144 lb 11.2 oz).  BP completed using cuff size: regular    Questioned patient about current smoking habits.  Pt. has never smoked.          The following HM Due: NONE      Margaret Arceo CMA on 2020 at 8:57 AM     "

## 2020-11-17 ENCOUNTER — OFFICE VISIT (OUTPATIENT)
Dept: INTERNAL MEDICINE | Facility: CLINIC | Age: 30
End: 2020-11-17
Payer: COMMERCIAL

## 2020-11-17 VITALS
SYSTOLIC BLOOD PRESSURE: 123 MMHG | HEIGHT: 65 IN | TEMPERATURE: 98.8 F | OXYGEN SATURATION: 100 % | HEART RATE: 67 BPM | RESPIRATION RATE: 18 BRPM | BODY MASS INDEX: 24.39 KG/M2 | DIASTOLIC BLOOD PRESSURE: 75 MMHG | WEIGHT: 146.4 LBS

## 2020-11-17 DIAGNOSIS — N64.4 PAIN OF BOTH BREASTS: Primary | ICD-10-CM

## 2020-11-17 PROCEDURE — 99213 OFFICE O/P EST LOW 20 MIN: CPT | Performed by: NURSE PRACTITIONER

## 2020-11-17 ASSESSMENT — MIFFLIN-ST. JEOR: SCORE: 1374.95

## 2020-11-17 NOTE — PROGRESS NOTES
"Subjective     Olimpia Herrera is a 30 year old female who presents to clinic today for the following health issues:    HPI         Breast Concern  Onset/Duration: 1 year  Description:   Location: bilateral breasts  Pain or tenderness: YES  Redness:  no   Intensity: mild, moderate  Progression of Symptoms: intermittent  Accompanying Signs & Symptoms:  Any lumps in axillary region: no  Movable:  no   Nipple discharge: none  Changes in the skin or nipple: none  On Hormone therapy:  no   Does it change with menstrual cycle: YES  Previous history of similar problem: none  First degree relative with breast cancer: a negative family history for breast cancer.  Precipitating factors:           Worsened by: sometimes worse with menses  Alleviating factors:            Improved by: none  Therapies tried and outcome: None. LMP 11/27        Review of Systems   Constitutional, HEENT, cardiovascular, pulmonary, gi and gu systems are negative, except as otherwise noted.      Objective    Ht 1.651 m (5' 5\")   BMI 24.08 kg/m    Body mass index is 24.08 kg/m .  Physical Exam   GENERAL: healthy, alert and no distress  BREAST: no palpable axillary masses or adenopathy, fibrocystic changes bilateral, nipple discharge none, skin dimpling none  and tenderness bilateral breasts            Assessment & Plan     Pain of both breasts    - MA Diagnostic Digital Bilateral; Future        Limit caffeine, monitor sx, mammo pending    Nathalie Casanova NP  [facility]    "

## 2020-11-23 ENCOUNTER — HOSPITAL ENCOUNTER (OUTPATIENT)
Dept: ULTRASOUND IMAGING | Facility: CLINIC | Age: 30
End: 2020-11-23
Attending: NURSE PRACTITIONER
Payer: COMMERCIAL

## 2020-11-23 DIAGNOSIS — N64.4 PAIN OF BOTH BREASTS: ICD-10-CM

## 2020-11-23 PROCEDURE — 76642 ULTRASOUND BREAST LIMITED: CPT | Mod: 50

## 2020-12-20 ENCOUNTER — HEALTH MAINTENANCE LETTER (OUTPATIENT)
Age: 30
End: 2020-12-20

## 2021-02-27 ENCOUNTER — NURSE TRIAGE (OUTPATIENT)
Dept: NURSING | Facility: CLINIC | Age: 31
End: 2021-02-27

## 2021-02-27 NOTE — TELEPHONE ENCOUNTER
Patient is calling requesting appointment for fertility testing. Stating she has been attempting to conceive x 1 year.    Last menstrual cycle 1/22/21.    Patient denies any current symptoms.    Transferred to Central Scheduling to request clinic appointment.    Lupe Michaud RN  [facility]    COVID 19 Nurse Triage Plan/Patient Instructions    Please be aware that novel coronavirus (COVID-19) may be circulating in the community. If you develop symptoms such as fever, cough, or SOB or if you have concerns about the presence of another infection including coronavirus (COVID-19), please contact your health care provider or visit [email or web address].     Disposition/Instructions    In-Person Visit with provider recommended. Reference Visit Selection Guide.    Thank you for taking steps to prevent the spread of this virus.  o Limit your contact with others.  o Wear a simple mask to cover your cough.  o Wash your hands well and often.    Resources    [facility]: About COVID-19: [email or web address]    CDC: What to Do If You're Sick: www.cdc.gov/coronavirus/2019-ncov/about/steps-when-sick.html    CDC: Ending Home Isolation: www.cdc.gov/coronavirus/2019-ncov/hcp/disposition-in-home-patients.html     CDC: Caring for Someone: www.cdc.gov/coronavirus/2019-ncov/if-you-are-sick/care-for-someone.html     [facility]: Interim Guidance for Hospital Discharge to Home: [email or web address]/coronavirus/hcp/hospdischarge.pdf    [facility] clinical trials (COVID-19 research studies): [email or web address]-clinical-trials     Below are the COVID-19 hotlines at the [place] [facility] ([facility]). Interpreters are available.   o For health questions: Call 236-249-3421 or 1-598.958.3850 (7 a.m. to 7 p.m.)  o For questions about schools and childcare: Call 766-085-6842 or 1-761.536.1210 (7 a.m. to 7 p.m.)                     Reason for Disposition    Requesting regular  office appointment    Additional Information    Negative: [1] Caller is not with the adult (patient) AND [2] reporting urgent symptoms    Negative: Lab result questions    Negative: Medication questions    Negative: Caller can't be reached by phone    Negative: Caller has already spoken to PCP or another triager    Negative: RN needs further essential information from caller in order to complete triage    Protocols used: INFORMATION ONLY CALL-A-AH

## 2021-03-05 ENCOUNTER — OFFICE VISIT (OUTPATIENT)
Dept: OBGYN | Facility: CLINIC | Age: 31
End: 2021-03-05
Payer: COMMERCIAL

## 2021-03-05 VITALS
BODY MASS INDEX: 24.96 KG/M2 | HEART RATE: 71 BPM | SYSTOLIC BLOOD PRESSURE: 118 MMHG | WEIGHT: 150 LBS | DIASTOLIC BLOOD PRESSURE: 70 MMHG | OXYGEN SATURATION: 100 %

## 2021-03-05 DIAGNOSIS — R10.84 ABDOMINAL PAIN, GENERALIZED: ICD-10-CM

## 2021-03-05 DIAGNOSIS — N97.9 FEMALE INFERTILITY, SECONDARY: ICD-10-CM

## 2021-03-05 DIAGNOSIS — N64.4 BREAST PAIN: ICD-10-CM

## 2021-03-05 DIAGNOSIS — N89.8 VAGINAL IRRITATION: ICD-10-CM

## 2021-03-05 DIAGNOSIS — Z01.419 ENCOUNTER FOR GYNECOLOGICAL EXAMINATION WITHOUT ABNORMAL FINDING: Primary | ICD-10-CM

## 2021-03-05 LAB
SPECIMEN SOURCE: NORMAL
WET PREP SPEC: NORMAL

## 2021-03-05 PROCEDURE — 99213 OFFICE O/P EST LOW 20 MIN: CPT | Mod: 25 | Performed by: OBSTETRICS & GYNECOLOGY

## 2021-03-05 PROCEDURE — 99395 PREV VISIT EST AGE 18-39: CPT | Performed by: OBSTETRICS & GYNECOLOGY

## 2021-03-05 PROCEDURE — 87210 SMEAR WET MOUNT SALINE/INK: CPT | Performed by: OBSTETRICS & GYNECOLOGY

## 2021-03-05 RX ORDER — CLOMIPHENE CITRATE 50 MG/1
50 TABLET ORAL DAILY
Qty: 5 TABLET | Refills: 3 | Status: SHIPPED | OUTPATIENT
Start: 2021-03-05 | End: 2021-06-17

## 2021-03-05 NOTE — NURSING NOTE
"Chief Complaint   Patient presents with     Physical     abd pain        Initial /70   Pulse 71   Wt 68 kg (150 lb)   LMP 2021   SpO2 100%   BMI 24.96 kg/m   Estimated body mass index is 24.96 kg/m  as calculated from the following:    Height as of 20: 1.651 m (5' 5\").    Weight as of this encounter: 68 kg (150 lb).  BP completed using cuff size: regular    Questioned patient about current smoking habits.  Pt. has never smoked.          The following HM Due: NONE      The following patient reported/Care Every where data was sent to:  P ABSTRACT QUALITY INITIATIVES [[id number]]        Amy Mcghee [facility]                 "

## 2021-03-05 NOTE — PROGRESS NOTES
Annual pelvic exam  Abdominal pain, lower  Vaginal irritation  Secondary infertility  Breast pain    Ms. Olimpia Hardyumma Obioma 30 year old P2 (hx of vaginal deliveries, as well as two spontaneous miscarriages) presents initially for her annual pelvic exam. In addition, she has other concerns.   1) Abdominal pain, lower  -- has been ongoing for about few weeks to a month  -- denies bowel and bladder habit issues  -- located bilaterally in the lower pelvis; character of pain is described as dull   -- denies fevers, chills  2) Vaginal irritation  -- concurrent with vaginal discharge  -- denies vaginal odor  -- also has been ongoing for about few weeks to a month  3) Secondary infertility  -- has been trying to get pregnant with her  for the last 3 years  -- she conceived her first two boys with her current ; interestingly, they conceived with Clomid therapy  -- patient reports cycles are 30-31 day intervals, menses is generally light in flow, denies dysmenorrhea  -- denies intermenstrual bleeding, postcoital bleeding and dyspareunia  4) Breast pain  -- states that it occurs just prior to the onset of her menses  -- pain is located on the lateral superior aspect of her left breast  -- notes that left breast is larger than right   -- has pain in the right breast but not as intense as the left breast     Past Medical History:   Diagnosis Date     Constipation      Eczema, dyshidrotic      Migraine        Past Surgical History:   Procedure Laterality Date     APPENDECTOMY         Current Outpatient Medications   Medication     Prenatal Vit-Fe Fumarate-FA (PRENATAL 19 PO)     No current facility-administered medications for this visit.        Allergies   Allergen Reactions     Compazine [Prochlorperazine] Swelling     Swelling of tongue     Zantac [Ranitidine] Other (See Comments)     IV Zantac caused : Anxiety       Social History     Tobacco Use     Smoking status: Never Smoker     Smokeless tobacco: Never  Used   Substance Use Topics     Alcohol use: No     Alcohol/week: 0.0 standard drinks     Drug use: No       Family History   Problem Relation Age of Onset     Hypertension Mother        C: NEGATIVE for fever, chills, change in weight  HEENT: NEGATIVE for visual changes, runny nose, epistaxis, ear pain, tinnitus, tooth ache, sore throat, difficulty with swallowing, sinus pain  R: NEGATIVE for significant cough or SOB  CV: NEGATIVE for chest pain, palpitations or peripheral edema  BREAST: NEGATIVE For soreness, pain, lumps or nipple discharge  GI: NEGATIVE for nausea, abdominal pain, heartburn, or change in bowel habits  : NEGATIVE for frequency, dysuria, hematuria, vaginal discharge, or irregular bleeding  Neuro: NEGATIVE for numbness, tingling, focal weakness, or headache  INT: NEGATIVE for rashes, lesions or pruritis   PSYCH: NEGATIVE for anxiety, depression, or halluciinations    Exam:   /70   Pulse 71   Wt 68 kg (150 lb)   LMP 02/27/2021   SpO2 100%   BMI 24.96 kg/m    General Appearance: Well nourished, well developed female, NAD, AOx3  Neurological: Mental Status Normal and Station and Gait Normal   Skin: Normal skin turgor  HEET: Atraumatic, normocephalic, EOMI  Neck: Supple,no adenopathy,thyroid normal  Lungs: Good respiratory effort  Breast:: normal without suspicious masses, skin changes or axillary nodes, symmetric fibrous changes in both upper outer quadrants.  Abdomen: Soft, NT, ND, no masses  Pelvic: Normal external female genitalia.  No external lesions, normal hair distribution, no adenopathy. Speculum exam reveals vaginal epithelium well rugated with no abnormal discharge. Cervix appears smooth, pink, with no visible lesions. Bimanual exam reveals normal size uterus, anteverted, non-tender, and mobile. No adnexal masses or tenderness. No cervical motion tenderness.   Extremities: No clubbing, no cyanosis and no edema    A/P:   1) Annual pelvic exam  -- benign pelvic exam    2) Abdominal  pain, lower  -- unclear etiology  -- pelvic ultrasound ordered to assess for any gyn structural pathology that could be contributing to her pain ie enlarged ovarian cysts, enlarged uterine fibroids    3) Vaginal irritation, discharge  -- wet prep collected  -- patient declines GC/CT screening given monogamous relationship and no hx of STIs    4) Secondary infertility  -- unclear etiology  -- but given that in the past she conceived with Clomid therapy, felt that it would reasonable to prescribe her it for this condition  -- counseled patient on use of Clomid on day#5-day#9 (currently on day#7 of her cycle, so she would have to wait until her next menses resumed before taking Clomid), instructed then to take ovulation predictor kits starting day#12 of her cycle and if positive to engage in natural conception; if no menses by day#35, then perform pregnancy test, and if negative, then to call me to have provera prescribed to induce menses   -- patient conveys understanding of instructions     5) Breast pain  -- cyclical in nature and likely menstrual related  -- breast exam benign   -- reassurance provided to patient  -- counseled patient on use of supportive bra given unequal pain demonstrated from left compared to right which appears to be in line with her left being larger than her right    Total time spent was 45 minutes on the date of the encounter in chart review, patient visit, review of tests, documentation and counseling on the above medical conditions       Tanisha Oviedo MD  [facility]

## 2021-03-10 ENCOUNTER — ANCILLARY PROCEDURE (OUTPATIENT)
Dept: ULTRASOUND IMAGING | Facility: CLINIC | Age: 31
End: 2021-03-10
Attending: OBSTETRICS & GYNECOLOGY
Payer: COMMERCIAL

## 2021-03-10 DIAGNOSIS — R10.84 ABDOMINAL PAIN, GENERALIZED: ICD-10-CM

## 2021-03-10 PROCEDURE — 76830 TRANSVAGINAL US NON-OB: CPT | Performed by: OBSTETRICS & GYNECOLOGY

## 2021-03-10 PROCEDURE — 76856 US EXAM PELVIC COMPLETE: CPT | Performed by: OBSTETRICS & GYNECOLOGY

## 2021-03-11 ENCOUNTER — OFFICE VISIT (OUTPATIENT)
Dept: OBGYN | Facility: CLINIC | Age: 31
End: 2021-03-11
Payer: COMMERCIAL

## 2021-03-11 VITALS — SYSTOLIC BLOOD PRESSURE: 122 MMHG | BODY MASS INDEX: 25.08 KG/M2 | DIASTOLIC BLOOD PRESSURE: 60 MMHG | WEIGHT: 150.7 LBS

## 2021-03-11 DIAGNOSIS — N97.9 FEMALE INFERTILITY, SECONDARY: ICD-10-CM

## 2021-03-11 DIAGNOSIS — Z79.899 MEDICATION MANAGEMENT: Primary | ICD-10-CM

## 2021-03-11 PROCEDURE — 99214 OFFICE O/P EST MOD 30 MIN: CPT | Performed by: OBSTETRICS & GYNECOLOGY

## 2021-03-11 NOTE — RESULT ENCOUNTER NOTE
Inform patient that her pelvic ultrasound returned normal. There is no gynecological cause for her abdominal pain.     Tanisha Oviedo MD

## 2021-03-11 NOTE — PROGRESS NOTES
Secondary infertility    Ms Rose Rivas Obioma returns to discuss concerns regarding Clomid use.   She last saw me 3/5 for among various concerns secondary infertility and history of having conceived on Clomid in the past (one that lead to a viable male infant and the other the lead to a miscarriage). She at that time was requesting Clomid refill as she has tried to give it 3 years now to conceive naturally. She had conceived naturally with their last child, who is now [age] years of age.   At any rate, she is concerned about whether Clomid causes cancer which is why she is hesitant to start it.     /60   Wt 68.4 kg (150 lb 11.2 oz)   LMP 02/27/2021   BMI 25.08 kg/m    General Appearance: NAD    A/P: Medication management, Secondary infertility   -- assured patient of no clinical significant association between Clomid use and malignancy  -- reviewed mechanism of action of Clomid and looked over adverse reactions as listed on UpToDate; it lists neoplasm as less than 1% risk  -- patient feels reassured  -- informed patient about having refilled her Clomid prescription and again reviewed use of medication (ie day#5-day#9 with ovulation predictor kit testing starting day#11)     Total time spent was 30 minutes on the date of the encounter in chart review, patient visit, review of tests, documentation and counseling on the above medical condition.    Tanisha Oviedo MD  [facility]

## 2021-03-11 NOTE — NURSING NOTE
"Chief Complaint   Patient presents with     Consult     for fertility, patient has been trying to concieve for 2+ years, patient has 2 children, but used clomid.        Initial /60   Wt 68.4 kg (150 lb 11.2 oz)   LMP 2021   BMI 25.08 kg/m   Estimated body mass index is 25.08 kg/m  as calculated from the following:    Height as of 20: 1.651 m (5' 5\").    Weight as of this encounter: 68.4 kg (150 lb 11.2 oz).  BP completed using cuff size: regular    Questioned patient about current smoking habits.  Pt. has never smoked.          The following HM Due: NONE    Zandra Mckeon CMA               "

## 2021-03-18 ENCOUNTER — TELEPHONE (OUTPATIENT)
Dept: OBGYN | Facility: CLINIC | Age: 31
End: 2021-03-18

## 2021-03-18 DIAGNOSIS — R12 HEARTBURN: Primary | ICD-10-CM

## 2021-03-18 RX ORDER — FAMOTIDINE 20 MG/1
20 TABLET, FILM COATED ORAL 2 TIMES DAILY
Qty: 90 TABLET | Refills: 1 | Status: ON HOLD | OUTPATIENT
Start: 2021-03-18 | End: 2021-11-01

## 2021-03-18 NOTE — TELEPHONE ENCOUNTER
Patient is calling for refill of Zantac, not on current med list but she states she has taken it on and off for the past 4 years or so.  She has used tablet and liquid, doesn't care which one is prescribed.  She gets heartburn after eating anything.  She does not know at this time if she is pregnant.  ALISA Abrams R.N.

## 2021-03-18 NOTE — TELEPHONE ENCOUNTER
Since Zantac is off the shelves now, I will prescribe her the alternative medication but an H2 blocker nonetheless like the Zantac. It will be Pepcid.     Tanisha Oviedo MD

## 2021-06-17 ENCOUNTER — PRENATAL OFFICE VISIT (OUTPATIENT)
Dept: NURSING | Facility: CLINIC | Age: 31
End: 2021-06-17
Payer: COMMERCIAL

## 2021-06-17 DIAGNOSIS — Z34.90 SUPERVISION OF NORMAL PREGNANCY: Primary | ICD-10-CM

## 2021-06-17 PROCEDURE — 99207 PR NO CHARGE NURSE ONLY: CPT

## 2021-06-17 SDOH — ECONOMIC STABILITY: INCOME INSECURITY: HOW HARD IS IT FOR YOU TO PAY FOR THE VERY BASICS LIKE FOOD, HOUSING, MEDICAL CARE, AND HEATING?: NOT ASKED

## 2021-06-17 SDOH — ECONOMIC STABILITY: TRANSPORTATION INSECURITY
IN THE PAST 12 MONTHS, HAS LACK OF TRANSPORTATION KEPT YOU FROM MEETINGS, WORK, OR FROM GETTING THINGS NEEDED FOR DAILY LIVING?: NOT ASKED

## 2021-06-17 SDOH — ECONOMIC STABILITY: TRANSPORTATION INSECURITY
IN THE PAST 12 MONTHS, HAS THE LACK OF TRANSPORTATION KEPT YOU FROM MEDICAL APPOINTMENTS OR FROM GETTING MEDICATIONS?: NOT ASKED

## 2021-06-17 SDOH — ECONOMIC STABILITY: FOOD INSECURITY: WITHIN THE PAST 12 MONTHS, YOU WORRIED THAT YOUR FOOD WOULD RUN OUT BEFORE YOU GOT MONEY TO BUY MORE.: NOT ASKED

## 2021-06-17 SDOH — ECONOMIC STABILITY: FOOD INSECURITY: WITHIN THE PAST 12 MONTHS, THE FOOD YOU BOUGHT JUST DIDN'T LAST AND YOU DIDN'T HAVE MONEY TO GET MORE.: NOT ASKED

## 2021-06-17 NOTE — NURSING NOTE
NPN nurse visit done over the phone. Pt will be given NPN folder and book at her upcoming appt.   Discussed optional screening available to assess chromosomal anomalies. Questions answered. Pt advised to call the clinic if she has any questions or concerns related to her pregnancy. Prenatal labs will be obtained at her upcoming appt. New prenatal visit scheduled on 6/30 with Christiana    7w4d    Lab Results   Component Value Date    PAP NIL 11/21/2018           Patient supplied answers from flow sheet for:  Prenatal OB Questionnaire.  Past Medical History  Have you ever recieved care for your mental health? : No  Have you ever been in a major accident or suffered serious trauma?: No  Within the last year, has anyone hit, slapped, kicked or otherwise hurt you?: No  In the last year, has anyone forced you to have sex when you didn't want to?: No    Past Medical History 2   Have you ever received a blood transfusion?: No  Would you accept a blood transfusion if was medically recommended?: Yes  Does anyone in your home smoke?: No   Is your blood type Rh negative?: No  Have you ever ?: (!) Yes  Have you been hospitalized for a nonsurgical reason excluding normal delivery?: No  Have you ever had an abnormal pap smear?: No    Past Medical History (Continued)  Do you have a history of abnormalities of the uterus?: No  Did your mother take FELECIA or any other hormones when she was pregnant with you?: No  Do you have any other problems we have not asked about which you feel may be important to this pregnancy?: No    Sandra CUELLAR RN

## 2021-06-18 ENCOUNTER — TELEPHONE (OUTPATIENT)
Dept: OBGYN | Facility: CLINIC | Age: 31
End: 2021-06-18

## 2021-06-18 ENCOUNTER — HOSPITAL ENCOUNTER (EMERGENCY)
Facility: CLINIC | Age: 31
Discharge: HOME | End: 2021-06-18
Attending: EMERGENCY MEDICINE | Admitting: EMERGENCY MEDICINE
Payer: COMMERCIAL

## 2021-06-18 ENCOUNTER — APPOINTMENT (OUTPATIENT)
Dept: ULTRASOUND IMAGING | Facility: CLINIC | Age: 31
End: 2021-06-18
Attending: PHYSICIAN ASSISTANT
Payer: COMMERCIAL

## 2021-06-18 VITALS
OXYGEN SATURATION: 100 % | DIASTOLIC BLOOD PRESSURE: 73 MMHG | RESPIRATION RATE: 16 BRPM | TEMPERATURE: 97 F | HEART RATE: 63 BPM | SYSTOLIC BLOOD PRESSURE: 117 MMHG

## 2021-06-18 DIAGNOSIS — R10.32 LLQ ABDOMINAL PAIN: ICD-10-CM

## 2021-06-18 DIAGNOSIS — Z34.91 NORMAL IUP (INTRAUTERINE PREGNANCY) ON PRENATAL ULTRASOUND, FIRST TRIMESTER: ICD-10-CM

## 2021-06-18 DIAGNOSIS — N83.202 LEFT OVARIAN CYST: ICD-10-CM

## 2021-06-18 LAB
ABO + RH BLD: NORMAL
ABO + RH BLD: NORMAL
ALBUMIN UR-MCNC: NEGATIVE MG/DL
ANION GAP SERPL CALCULATED.3IONS-SCNC: 4 MMOL/L (ref 3–14)
APPEARANCE UR: ABNORMAL
B-HCG SERPL-ACNC: ABNORMAL IU/L (ref 0–5)
BASOPHILS # BLD AUTO: 0 10E9/L (ref 0–0.2)
BASOPHILS NFR BLD AUTO: 0.3 %
BILIRUB UR QL STRIP: NEGATIVE
BLD GP AB SCN SERPL QL: NORMAL
BLOOD BANK CMNT PATIENT-IMP: NORMAL
BUN SERPL-MCNC: 9 MG/DL (ref 7–30)
CALCIUM SERPL-MCNC: 9.3 MG/DL (ref 8.5–10.1)
CHLORIDE SERPL-SCNC: 105 MMOL/L (ref 94–109)
CO2 SERPL-SCNC: 25 MMOL/L (ref 20–32)
COLOR UR AUTO: ABNORMAL
CREAT SERPL-MCNC: 0.8 MG/DL (ref 0.52–1.04)
DIFFERENTIAL METHOD BLD: NORMAL
EOSINOPHIL # BLD AUTO: 0 10E9/L (ref 0–0.7)
EOSINOPHIL NFR BLD AUTO: 0.3 %
ERYTHROCYTE [DISTWIDTH] IN BLOOD BY AUTOMATED COUNT: 13.1 % (ref 10–15)
GFR SERPL CREATININE-BSD FRML MDRD: >90 ML/MIN/{1.73_M2}
GLUCOSE SERPL-MCNC: 64 MG/DL (ref 70–99)
GLUCOSE UR STRIP-MCNC: NEGATIVE MG/DL
HCT VFR BLD AUTO: 35.7 % (ref 35–47)
HGB BLD-MCNC: 11.7 G/DL (ref 11.7–15.7)
HGB UR QL STRIP: NEGATIVE
IMM GRANULOCYTES # BLD: 0 10E9/L (ref 0–0.4)
IMM GRANULOCYTES NFR BLD: 0.3 %
KETONES UR STRIP-MCNC: NEGATIVE MG/DL
LEUKOCYTE ESTERASE UR QL STRIP: NEGATIVE
LIPASE SERPL-CCNC: 106 U/L (ref 73–393)
LYMPHOCYTES # BLD AUTO: 1.8 10E9/L (ref 0.8–5.3)
LYMPHOCYTES NFR BLD AUTO: 26 %
MCH RBC QN AUTO: 30.2 PG (ref 26.5–33)
MCHC RBC AUTO-ENTMCNC: 32.8 G/DL (ref 31.5–36.5)
MCV RBC AUTO: 92 FL (ref 78–100)
MONOCYTES # BLD AUTO: 0.6 10E9/L (ref 0–1.3)
MONOCYTES NFR BLD AUTO: 8 %
NEUTROPHILS # BLD AUTO: 4.5 10E9/L (ref 1.6–8.3)
NEUTROPHILS NFR BLD AUTO: 65.1 %
NITRATE UR QL: NEGATIVE
NRBC # BLD AUTO: 0 10*3/UL
NRBC BLD AUTO-RTO: 0 /100
PH UR STRIP: 7.5 PH (ref 5–7)
PLATELET # BLD AUTO: 209 10E9/L (ref 150–450)
POTASSIUM SERPL-SCNC: 3.8 MMOL/L (ref 3.4–5.3)
RBC # BLD AUTO: 3.88 10E12/L (ref 3.8–5.2)
RBC #/AREA URNS AUTO: 0 /HPF (ref 0–2)
SODIUM SERPL-SCNC: 134 MMOL/L (ref 133–144)
SOURCE: ABNORMAL
SP GR UR STRIP: 1.02 (ref 1–1.03)
SPECIMEN EXP DATE BLD: NORMAL
UROBILINOGEN UR STRIP-MCNC: NORMAL MG/DL (ref 0–2)
WBC # BLD AUTO: 6.9 10E9/L (ref 4–11)
WBC #/AREA URNS AUTO: 1 /HPF (ref 0–5)

## 2021-06-18 PROCEDURE — 250N000013 HC RX MED GY IP 250 OP 250 PS 637: Performed by: PHYSICIAN ASSISTANT

## 2021-06-18 PROCEDURE — 258N000003 HC RX IP 258 OP 636: Performed by: PHYSICIAN ASSISTANT

## 2021-06-18 PROCEDURE — 250N000011 HC RX IP 250 OP 636: Performed by: PHYSICIAN ASSISTANT

## 2021-06-18 PROCEDURE — 86901 BLOOD TYPING SEROLOGIC RH(D): CPT | Performed by: EMERGENCY MEDICINE

## 2021-06-18 PROCEDURE — 96374 THER/PROPH/DIAG INJ IV PUSH: CPT

## 2021-06-18 PROCEDURE — 83690 ASSAY OF LIPASE: CPT | Performed by: EMERGENCY MEDICINE

## 2021-06-18 PROCEDURE — 81001 URINALYSIS AUTO W/SCOPE: CPT | Performed by: EMERGENCY MEDICINE

## 2021-06-18 PROCEDURE — 96375 TX/PRO/DX INJ NEW DRUG ADDON: CPT

## 2021-06-18 PROCEDURE — 76801 OB US < 14 WKS SINGLE FETUS: CPT

## 2021-06-18 PROCEDURE — 86900 BLOOD TYPING SEROLOGIC ABO: CPT | Performed by: EMERGENCY MEDICINE

## 2021-06-18 PROCEDURE — 85025 COMPLETE CBC W/AUTO DIFF WBC: CPT | Performed by: EMERGENCY MEDICINE

## 2021-06-18 PROCEDURE — 84702 CHORIONIC GONADOTROPIN TEST: CPT | Performed by: EMERGENCY MEDICINE

## 2021-06-18 PROCEDURE — 86850 RBC ANTIBODY SCREEN: CPT | Performed by: EMERGENCY MEDICINE

## 2021-06-18 PROCEDURE — 80048 BASIC METABOLIC PNL TOTAL CA: CPT | Performed by: EMERGENCY MEDICINE

## 2021-06-18 PROCEDURE — 96361 HYDRATE IV INFUSION ADD-ON: CPT

## 2021-06-18 PROCEDURE — 99285 EMERGENCY DEPT VISIT HI MDM: CPT | Mod: 25

## 2021-06-18 RX ORDER — ONDANSETRON 4 MG/1
4 TABLET, ORALLY DISINTEGRATING ORAL EVERY 6 HOURS PRN
Qty: 10 TABLET | Refills: 0 | Status: ON HOLD | OUTPATIENT
Start: 2021-06-18 | End: 2021-11-01

## 2021-06-18 RX ORDER — ACETAMINOPHEN 500 MG
1000 TABLET ORAL ONCE
Status: COMPLETED | OUTPATIENT
Start: 2021-06-18 | End: 2021-06-18

## 2021-06-18 RX ORDER — DIPHENHYDRAMINE HYDROCHLORIDE 50 MG/ML
25 INJECTION INTRAMUSCULAR; INTRAVENOUS ONCE
Status: COMPLETED | OUTPATIENT
Start: 2021-06-18 | End: 2021-06-18

## 2021-06-18 RX ORDER — METOCLOPRAMIDE HYDROCHLORIDE 5 MG/ML
5 INJECTION INTRAMUSCULAR; INTRAVENOUS ONCE
Status: COMPLETED | OUTPATIENT
Start: 2021-06-18 | End: 2021-06-18

## 2021-06-18 RX ADMIN — ACETAMINOPHEN 1000 MG: 500 TABLET, FILM COATED ORAL at 17:24

## 2021-06-18 RX ADMIN — DIPHENHYDRAMINE HYDROCHLORIDE 25 MG: 50 INJECTION, SOLUTION INTRAMUSCULAR; INTRAVENOUS at 17:34

## 2021-06-18 RX ADMIN — SODIUM CHLORIDE 1000 ML: 9 INJECTION, SOLUTION INTRAVENOUS at 17:24

## 2021-06-18 RX ADMIN — METOCLOPRAMIDE HYDROCHLORIDE 5 MG: 5 INJECTION INTRAMUSCULAR; INTRAVENOUS at 17:24

## 2021-06-18 NOTE — ED PROVIDER NOTES
History   Chief Complaint:  Abdominal Pain       HPI   Olimpia Herrera is a 7 week pregnant 30 year old female who presents with left lower quadrant abdominal pain.  She states this started last night and is worse when she lies on that side.  She has had some nausea and vomited associated with it.  No fever, and her bowel movements have been normal without blood diarrhea or constipation.  No dysuria or hematuria.  She has not had any vaginal bleeding or discharge.  She has not yet had an ultrasound performed this pregnancy.  No assisted reproductive therapy    Review of Systems   All other systems reviewed and are negative.      Allergies:  Compazine  Zantac    Medications:  Famotidine    Past Medical History:    Anemia  Eczema, dyshidrotic  Migraines  GERD     Past Surgical History:    Appendectomy    Family History:    Mother: Hypertension    Social History:  The patient was unaccompanied to the ER  No alcohol use    Physical Exam     Patient Vitals for the past 24 hrs:   BP Temp Temp src Pulse Resp SpO2   06/18/21 1504 124/77 97  F (36.1  C) Temporal 72 16 99 %       Physical Exam  General: Awake, alert, pleasant, non-toxic.  Head:  Scalp is NC/AT  Eyes:  Conjunctiva normal, PERRL  ENT:  The external nose and ears are normal.   Neck:  Normal range of motion without rigidity.  CV:  Regular rate and rhythm    No pathologic murmur, rubs, or gallops.  Resp:  Breath sounds are clear bilaterally.  No crackles, wheezes, rhonchi, stridor.    Non-labored, no retractions or accessory muscle use  Abdomen: Abdomen is soft, no distension, no tenderness, no masses. No CVA tenderness.  MS:  No lower extremity edema or asymmetric calf swelling.  Skin:  Warm and dry, No rash or lesions noted. 2+ peripheral pulses in all extremities  Neuro: Alert and oriented x3.  No gross motor deficits.  No facial asymmetry.  Psych: Awake. Alert. Normal affect. Appropriate interactions.    Emergency Department Course   Imaging:  US OB 1st  Trimester W Transvaginal W Doppler   Final Result   IMPRESSION:    1.  Single living intrauterine gestation at 8 weeks 0 days, EDC 2022. Clinical EDC 2022.   2.  Left adnexal corpus luteum cyst of pregnancy without evidence for torsion                Laboratory:  Labs Ordered and Resulted from Time of ED Arrival Up to the Time of Departure from the ED   ROUTINE UA WITH MICROSCOPIC - Abnormal; Notable for the following components:       Result Value    pH Urine 7.5 (*)     All other components within normal limits   BASIC METABOLIC PANEL - Abnormal; Notable for the following components:    Glucose 64 (*)     All other components within normal limits   HCG QUANTITATIVE PREGNANCY - Abnormal; Notable for the following components:    HCG Quantitative Serum 73,758 (*)     All other components within normal limits   CBC WITH PLATELETS DIFFERENTIAL   LIPASE   PERIPHERAL IV CATHETER   ABO/RH TYPE AND SCREEN     Emergency Department Course:    Reviewed:  I reviewed nursing notes, vitals and past medical history    Assessments:   I obtained history and examined the patient as noted above.    I re-checked the patient.  She feels better and passed PO challenge with juice and food.  Interventions:  Medications   acetaminophen (TYLENOL) tablet 1,000 mg (1,000 mg Oral Given 21)   0.9% sodium chloride BOLUS (1,000 mLs Intravenous New Bag 21)   metoclopramide (REGLAN) injection 5 mg (5 mg Intravenous Given 21)   diphenhydrAMINE (BENADRYL) injection 25 mg (25 mg Intravenous Given 21)       Disposition:  The patient was discharged to home.       Impression & Plan     Medical Decision Makin-year-old 7-week pregnant female presents with left lower quadrant abdominal pain which began last night.  On examination she is well-appearing with normal vitals.  Work-up here demonstrates evidence of intrauterine pregnancy and ultrasound, as well as small left adnexal cyst.  No  evidence of torsion and I feel intermittent torsion is very unlikely given small size. Very low suspicion for heterotopic preg, she does not have any vaginal bleeding and is Rh+ and no indication for RhoGam.  Her blood work shows normal white blood cell count, hemoglobin, platelets, electrolytes, lipase.  UA is not suggestive of infection.  Abdominal exam is benign and nontender and in conjunction with normal laboratory work-up I think the likelihood of intra-abdominal catastrophe such as diverticulitis, bowel obstruction, abscess, is very unlikely.      Her glucose was slightly low though she has not eaten yet today since breakfast and she was given juice and food and felt better.  She is tolerating p.o. and would like to go home.  After discussion of risks and benefits we will prescribe Zofran as she did not tolerate Reglan very well here and had brief EPS reaction she will call her OB tomorrow to discuss follow-up.  She will return if new or worsening pain, fever, development of vaginal bleeding, or other new or worsening symptoms.      Diagnosis:    ICD-10-CM    1. Normal IUP (intrauterine pregnancy) on prenatal ultrasound, first trimester  Z34.91    2. Left ovarian cyst  N83.202    3. LLQ abdominal pain  R10.32        Discharge Medications:  New Prescriptions    ONDANSETRON (ZOFRAN ODT) 4 MG ODT TAB    Take 1 tablet (4 mg) by mouth every 6 hours as needed for nausea or vomiting        Horace Mixon PA-C  06/18/21 1941

## 2021-06-18 NOTE — ED TRIAGE NOTES
7 weeks pregnant. C/O LLQ pain began today without vaginal bleeding. Pt states pain worsens when lying on R side. ABC in tact. A/OX4

## 2021-06-18 NOTE — TELEPHONE ENCOUNTER
"Patient callinw5d  G3.P2  C/o pain LLQ that started last night.  Had difficulty sleeping. Unable to lie on left side.  Pain is getting worse.  Pain 10 on a 1-10 scale.  No bleeding. No temp.  Denies UTI symptoms or constipation  \"feeling cold\"    Recommend pt go to ER for evaluation.  Message sent to midwife on call.  Martha Vinson RN    "

## 2021-06-19 ENCOUNTER — HOSPITAL ENCOUNTER (EMERGENCY)
Facility: CLINIC | Age: 31
Discharge: HOME | End: 2021-06-20
Attending: PHYSICIAN ASSISTANT | Admitting: PHYSICIAN ASSISTANT
Payer: COMMERCIAL

## 2021-06-19 DIAGNOSIS — R19.7 VOMITING AND DIARRHEA: ICD-10-CM

## 2021-06-19 DIAGNOSIS — R11.10 VOMITING AND DIARRHEA: ICD-10-CM

## 2021-06-19 LAB
ALBUMIN SERPL-MCNC: 3.8 G/DL (ref 3.4–5)
ALP SERPL-CCNC: 37 U/L (ref 40–150)
ALT SERPL W P-5'-P-CCNC: 21 U/L (ref 0–50)
ANION GAP SERPL CALCULATED.3IONS-SCNC: 1 MMOL/L (ref 3–14)
AST SERPL W P-5'-P-CCNC: 11 U/L (ref 0–45)
BASOPHILS # BLD AUTO: 0 10E9/L (ref 0–0.2)
BASOPHILS NFR BLD AUTO: 0.3 %
BILIRUB SERPL-MCNC: 0.3 MG/DL (ref 0.2–1.3)
BUN SERPL-MCNC: 8 MG/DL (ref 7–30)
CALCIUM SERPL-MCNC: 9 MG/DL (ref 8.5–10.1)
CHLORIDE SERPL-SCNC: 107 MMOL/L (ref 94–109)
CO2 SERPL-SCNC: 29 MMOL/L (ref 20–32)
CREAT SERPL-MCNC: 0.74 MG/DL (ref 0.52–1.04)
DIFFERENTIAL METHOD BLD: ABNORMAL
EOSINOPHIL # BLD AUTO: 0 10E9/L (ref 0–0.7)
EOSINOPHIL NFR BLD AUTO: 0.3 %
ERYTHROCYTE [DISTWIDTH] IN BLOOD BY AUTOMATED COUNT: 12.9 % (ref 10–15)
GFR SERPL CREATININE-BSD FRML MDRD: >90 ML/MIN/{1.73_M2}
GLUCOSE SERPL-MCNC: 89 MG/DL (ref 70–99)
HCT VFR BLD AUTO: 32.4 % (ref 35–47)
HGB BLD-MCNC: 10.7 G/DL (ref 11.7–15.7)
IMM GRANULOCYTES # BLD: 0 10E9/L (ref 0–0.4)
IMM GRANULOCYTES NFR BLD: 0.3 %
LIPASE SERPL-CCNC: 85 U/L (ref 73–393)
LYMPHOCYTES # BLD AUTO: 1.7 10E9/L (ref 0.8–5.3)
LYMPHOCYTES NFR BLD AUTO: 28.4 %
MCH RBC QN AUTO: 29.6 PG (ref 26.5–33)
MCHC RBC AUTO-ENTMCNC: 33 G/DL (ref 31.5–36.5)
MCV RBC AUTO: 90 FL (ref 78–100)
MONOCYTES # BLD AUTO: 0.5 10E9/L (ref 0–1.3)
MONOCYTES NFR BLD AUTO: 8.1 %
NEUTROPHILS # BLD AUTO: 3.7 10E9/L (ref 1.6–8.3)
NEUTROPHILS NFR BLD AUTO: 62.6 %
NRBC # BLD AUTO: 0 10*3/UL
NRBC BLD AUTO-RTO: 0 /100
PLATELET # BLD AUTO: 201 10E9/L (ref 150–450)
POTASSIUM SERPL-SCNC: 3.6 MMOL/L (ref 3.4–5.3)
PROT SERPL-MCNC: 7.4 G/DL (ref 6.8–8.8)
RBC # BLD AUTO: 3.62 10E12/L (ref 3.8–5.2)
SODIUM SERPL-SCNC: 137 MMOL/L (ref 133–144)
WBC # BLD AUTO: 6 10E9/L (ref 4–11)

## 2021-06-19 PROCEDURE — 258N000003 HC RX IP 258 OP 636: Performed by: PHYSICIAN ASSISTANT

## 2021-06-19 PROCEDURE — 80053 COMPREHEN METABOLIC PANEL: CPT | Performed by: PHYSICIAN ASSISTANT

## 2021-06-19 PROCEDURE — 85025 COMPLETE CBC W/AUTO DIFF WBC: CPT | Performed by: PHYSICIAN ASSISTANT

## 2021-06-19 PROCEDURE — 96361 HYDRATE IV INFUSION ADD-ON: CPT

## 2021-06-19 PROCEDURE — 99284 EMERGENCY DEPT VISIT MOD MDM: CPT | Mod: 25

## 2021-06-19 PROCEDURE — 250N000011 HC RX IP 250 OP 636: Performed by: PHYSICIAN ASSISTANT

## 2021-06-19 PROCEDURE — 83690 ASSAY OF LIPASE: CPT | Performed by: PHYSICIAN ASSISTANT

## 2021-06-19 PROCEDURE — 96374 THER/PROPH/DIAG INJ IV PUSH: CPT

## 2021-06-19 RX ORDER — ONDANSETRON 2 MG/ML
4 INJECTION INTRAMUSCULAR; INTRAVENOUS ONCE
Status: COMPLETED | OUTPATIENT
Start: 2021-06-19 | End: 2021-06-19

## 2021-06-19 RX ADMIN — ONDANSETRON 4 MG: 2 INJECTION INTRAMUSCULAR; INTRAVENOUS at 23:39

## 2021-06-19 RX ADMIN — SODIUM CHLORIDE 1000 ML: 9 INJECTION, SOLUTION INTRAVENOUS at 23:39

## 2021-06-19 ASSESSMENT — ENCOUNTER SYMPTOMS
DIARRHEA: 1
VOMITING: 1
DYSURIA: 0
NAUSEA: 1

## 2021-06-19 NOTE — DISCHARGE INSTRUCTIONS
Discharge Instructions  Ovarian Cyst    Abdominal (belly) pain can be caused by many things. Your provider today has found that you have a cyst on the ovary. Women in their reproductive years form cysts every month, but only cause pain if they are very large, or if they rupture and release blood or fluid. Fortunately, they rarely require surgery or hospitalization. The pain from a ruptured cyst usually gets gradually better, and should be much better within a few days. If there is a large cyst, it will usually go away within 1-2 months, but needs to be watched to be sure it does go away, since sometimes a large cyst can become a cancer. There can be complications of a cyst, or other problems that cannot be found right away, so it is very important that you follow up as directed.      Generally, every Emergency Department visit should have a follow-up clinic visit with either a primary or a specialty clinic/provider. Please follow-up as instructed by your emergency provider today.    Return to the Emergency Department right away if:  Your pain becomes much worse or constant  You get an oral temperature above 100.4 F or as directed by your provider.  You have frequent vomiting  You faint, or feel very weak.  You have new symptoms or anything that worries you.    What can I do to help myself?  Take any medication prescribed by your provider.  You may use Tylenol  (acetaminophen) or Advil , Motrin  (ibuprofen) for pain. Be sure to read and follow the package directions, and ask your provider if you have questions.  Avoid sex for several days, because it will probably be painful.    If you were given a prescription for medicine here today, be sure to read all of the information (including the package insert) that comes with your prescription.  This will include important information about the medicine, its side effects, and any warnings that you need to know about.  The pharmacist who fills the prescription can provide  more information and answer questions you may have about the medicine.  If you have questions or concerns that the pharmacist cannot address, please call or return to the Emergency Department.     Remember that you can always come back to the Emergency Department if you are not able to see your regular provider in the amount of time listed above, if you get any new symptoms, or if there is anything that worries you.  Discharge Instructions  Abdominal Pain    Abdominal pain (belly pain) can be caused by many things. Your evaluation today does not show the exact cause for your pain. Your provider today has decided that it is unlikely your pain is due to a life threatening problem, or a problem requiring surgery or hospital admission. Sometimes those problems cannot be found right away, so it is very important that you follow up as directed.  Sometimes only the changes which occur over time allow the cause of your pain to be found.    Generally, every Emergency Department visit should have a follow-up clinic visit with either a primary or a specialty clinic/provider. Please follow-up as instructed by your emergency provider today. With abdominal pain, we often recommend very close follow-up, such as the following day.    ADULTS:  Return to the Emergency Department right away if:    You get an oral temperature above 102oF or as directed by your provider.  You have blood in your stools. This may be bright red or appear as black, tarry stools.    You keep vomiting (throwing up) or cannot drink liquids.  You see blood when you vomit.   You cannot have a bowel movement or you cannot pass gas.  Your stomach gets bloated or bigger.  Your skin or the whites of your eyes look yellow.  You faint.  You have bloody, frequent or painful urination (peeing).  You have new symptoms or anything that worries you.    CHILDREN:  Return to the Emergency Department right away if your child has any of the above-listed symptoms or the  following:    Pushes your hand away or screams/cries when his/her belly is touched.  You notice your child is very fussy or weak.  Your child is very tired and is too tired to eat or drink.  Your child is dehydrated.  Signs of dehydration can be:  Significant change in the amount of wet diapers/urine.  Your infant or child starts to have dry mouth and lips, or no saliva (spit) or tears.    PREGNANT WOMEN:  Return to the Emergency Department right away if you have any of the above-listed symptoms or the following:    You have bleeding, leaking fluid or passing tissue from the vagina.  You have worse pain or cramping, or pain in your shoulder or back.  You have vomiting that will not stop.  You have a temperature of 100oF or more.  Your baby is not moving as much as usual.  You faint.  You get a bad headache with or without eye problems and abdominal pain.  You have a seizure.  You have unusual discharge from your vagina and abdominal pain.    Abdominal pain is pretty common during pregnancy.  Your pain may or may not be related to your pregnancy. You should follow-up closely with your OB provider so they can evaluate you and your baby.  Until you follow-up with your regular provider, do the following:     Avoid sex and do not put anything in your vagina.  Drink clear fluids.  Only take medications approved by your provider.    MORE INFORMATION:    Appendicitis:  A possible cause of abdominal pain in any person who still has their appendix is acute appendicitis. Appendicitis is often hard to diagnose.  Testing does not always rule out early appendicitis or other causes of abdominal pain. Close follow-up with your provider and re-evaluations may be needed to figure out the reason for your abdominal pain.    Follow-up:  It is very important that you make an appointment with your clinic and go to the appointment.  If you do not follow-up with your primary provider, it may result in missing an important development which  "could result in permanent injury or disability and/or lasting pain.  If there is any problem keeping your appointment, call your provider or return to the Emergency Department.    Medications:  Take your medications as directed by your provider today.  Before using over-the-counter medications, ask your provider and make sure to take the medications as directed.  If you have any questions about medications, ask your provider.    Diet:  Resume your normal diet as much as possible, but do not eat fried, fatty or spicy foods while you have pain.  Do not drink alcohol or have caffeine.  Do not smoke tobacco.    Probiotics: If you have been given an antibiotic, you may want to also take a probiotic pill or eat yogurt with live cultures. Probiotics have \"good bacteria\" to help your intestines stay healthy. Studies have shown that probiotics help prevent diarrhea (loose stools) and other intestine problems (including C. diff infection) when you take antibiotics. You can buy these without a prescription in the pharmacy section of the store.     If you were given a prescription for medicine here today, be sure to read all of the information (including the package insert) that comes with your prescription.  This will include important information about the medicine, its side effects, and any warnings that you need to know about.  The pharmacist who fills the prescription can provide more information and answer questions you may have about the medicine.  If you have questions or concerns that the pharmacist cannot address, please call or return to the Emergency Department.       Remember that you can always come back to the Emergency Department if you are not able to see your regular provider in the amount of time listed above, if you get any new symptoms, or if there is anything that worries you.    "

## 2021-06-20 VITALS
OXYGEN SATURATION: 97 % | RESPIRATION RATE: 16 BRPM | SYSTOLIC BLOOD PRESSURE: 105 MMHG | TEMPERATURE: 98.1 F | DIASTOLIC BLOOD PRESSURE: 52 MMHG | HEART RATE: 72 BPM

## 2021-06-20 ASSESSMENT — ENCOUNTER SYMPTOMS: FEVER: 0

## 2021-06-20 NOTE — ED PROVIDER NOTES
History   Chief Complaint:  Nausea, Vomiting, & Diarrhea       The history is provided by the patient.      Olimpia Herrera is a 8 week pregnant 30 year old female who presents with nausea, vomiting, and diarrhea. The patient reported to the ED yesterday for abdominal pain with some associated nausea and vomiting. The patient reports to the ED today for continued symptoms though notes her pain is somewhat better. The patient reports she is vomiting up blood once but not since. Patient denies blood in stool and fever. The patient denies exposure to other ill people.  No vaginal bleeding.  She had US yesterday which showed live IUP.  No dysuria.  No fever. No headache, vision changes or chest pain.      Review of Systems   Constitutional: Negative for fever.   Gastrointestinal: Positive for diarrhea, nausea and vomiting.   Genitourinary: Negative for dysuria.   All other systems reviewed and are negative.    Allergies:  Compazine  Zantac    Medications:  famotidine  ondansetron     Past Medical History:    Anemia  Constipation  Eczema  Migraine  Varicella  GERD    Past Surgical History:    Appendectomy     Family History:    Hypertension    Social History:  Patient presents alone.   No alcohol use    Physical Exam     Patient Vitals for the past 24 hrs:   BP Temp Temp src Pulse Resp SpO2   06/20/21 0100 -- -- -- -- -- 100 %   06/20/21 0030 99/55 -- -- 78 -- 100 %   06/20/21 0015 114/66 -- -- 60 -- 100 %   06/20/21 0000 117/69 -- -- 61 -- 98 %   06/19/21 2345 126/77 -- -- 61 -- 100 %   06/19/21 2245 122/69 -- -- 64 -- --   06/19/21 2226 135/80 98.1  F (36.7  C) Oral 65 20 98 %       Physical Exam  General: Awake, alert, pleasant, non-toxic.  Head:  Scalp is NC/AT  Eyes:  Conjunctiva normal, PERRL  ENT:  The external nose and ears are normal.   Neck:  Normal range of motion without rigidity.  CV:  Regular rate and rhythm    No pathologic murmur, rubs, or gallops.  Resp:  Breath sounds are clear bilaterally.  No  crackles, wheezes, rhonchi, stridor.    Non-labored, no retractions or accessory muscle use  Abdomen: Abdomen is soft, no distension, no tenderness, no masses. No CVA tenderness.  MS:  No lower extremity edema or asymmetric calf swelling. Normal ROM in all joints without effusions.    No midline cervical, thoracic, or lumbar tenderness  Skin:  Warm and dry, No rash or lesions noted. 2+ peripheral pulses in all extremities  Neuro: Alert and oriented x3.  No gross motor deficits.  No facial asymmetry.  Psych: Awake. Alert. Normal affect. Appropriate interactions.      Emergency Department Course     Laboratory:  CBC: WBC 6.0, HGB 10.7 (L),    CMP: Anion gap 1 (L) o/w WNL (Creatinine 0.74)   Lipase (2253): 85    Emergency Department Course:    Reviewed:  I reviewed nursing notes, vitals, past medical history and care everywhere    Assessments:  2343 I obtained history and examined the patient as noted above.   0103 I rechecked the patient and explained findings. The patient feels better and is tolerating PO. The patient is safe for discharge.      Interventions:  2339 NS 1ml IV  2339 Zofran 4mg IV    Disposition:  The patient was discharged to home.       Impression & Plan     Medical Decision Making:  Olimpia Herrera is a 30 year old female who presents with ongoing vomiting and diarrhea for the last 3 days.  She was seen yesterday for similar symptoms with some associated abdominal pain though notes the pain is actually better today.  Ultrasound yesterday showed single live intrauterine pregnancy and she is not having any vaginal bleeding.  Labs today are relatively unchanged with no leukocytosis, and lipase LFTs, electrolytes, kidney function reassuring.  Did report a single episode of hematemesis likely due to Jennifer-Hall tear, no bloody stools, hemoglobin essentially stable, BUN normal, again no further episodes and very low suspicion for more serious upper GI bleed.  Abdominal exam remains benign and  nontender and I think symptoms are very unlikely to represent intra-abdominal catastrophe such as appendicitis, diverticulitis, abscess, bowel obstruction, cholecystitis, ovarian torsion, pancreatitis etc.  Her UA yesterday was not suggestive of infection.  Likely nausea and vomiting in pregnancy with possible superimposed gastroenteritis.  No further episodes of vomiting here in the ED, feels improved and comfortable after Zofran and a bag of fluids and tolerating p.o.  She will follow up with OB on Monday.  She will return for new or worsening symptoms, returning or increasing pain, fever, vaginal bleeding etc.    Diagnosis:    ICD-10-CM    1. Vomiting and diarrhea  R11.10     R19.7        Discharge Medications:  Current Discharge Medication List          Scribe Disclosure:  I, Ledy Broussard, am serving as a scribe at 11:25 PM on 6/19/2021 to document services personally performed by Horace Mixon PA-C based on my observations and the provider's statements to me.            Horace Mixon PA-C  06/20/21 0222

## 2021-06-20 NOTE — ED TRIAGE NOTES
Pt states n/v/d for over 3 days. Pt states seen here for same one day ago and not improving despite taking Rx. ABCs intact GCS 15

## 2021-06-21 ENCOUNTER — HOSPITAL ENCOUNTER (OUTPATIENT)
Facility: CLINIC | Age: 31
Setting detail: OBSERVATION
Discharge: HOME | End: 2021-06-22
Attending: EMERGENCY MEDICINE | Admitting: OBSTETRICS & GYNECOLOGY
Payer: COMMERCIAL

## 2021-06-21 DIAGNOSIS — K22.6 MALLORY-WEISS TEAR: ICD-10-CM

## 2021-06-21 DIAGNOSIS — O21.0 HYPEREMESIS GRAVIDARUM: ICD-10-CM

## 2021-06-21 DIAGNOSIS — K92.0 HEMATEMESIS WITH NAUSEA: ICD-10-CM

## 2021-06-21 PROCEDURE — 81001 URINALYSIS AUTO W/SCOPE: CPT | Performed by: EMERGENCY MEDICINE

## 2021-06-21 PROCEDURE — 99285 EMERGENCY DEPT VISIT HI MDM: CPT | Mod: 25

## 2021-06-21 PROCEDURE — 80053 COMPREHEN METABOLIC PANEL: CPT | Performed by: EMERGENCY MEDICINE

## 2021-06-21 PROCEDURE — 85025 COMPLETE CBC W/AUTO DIFF WBC: CPT | Performed by: EMERGENCY MEDICINE

## 2021-06-21 PROCEDURE — C9803 HOPD COVID-19 SPEC COLLECT: HCPCS

## 2021-06-22 VITALS
WEIGHT: 160.3 LBS | DIASTOLIC BLOOD PRESSURE: 65 MMHG | BODY MASS INDEX: 29.5 KG/M2 | TEMPERATURE: 97.9 F | SYSTOLIC BLOOD PRESSURE: 113 MMHG | HEART RATE: 65 BPM | RESPIRATION RATE: 16 BRPM | HEIGHT: 62 IN | OXYGEN SATURATION: 100 %

## 2021-06-22 PROBLEM — K22.6 MALLORY-WEISS TEAR: Status: ACTIVE | Noted: 2021-06-22

## 2021-06-22 PROBLEM — O21.0 HYPEREMESIS GRAVIDARUM: Status: ACTIVE | Noted: 2021-06-22

## 2021-06-22 LAB
ALBUMIN SERPL-MCNC: 4.3 G/DL (ref 3.4–5)
ALBUMIN UR-MCNC: 20 MG/DL
ALP SERPL-CCNC: 41 U/L (ref 40–150)
ALT SERPL W P-5'-P-CCNC: 40 U/L (ref 0–50)
ANION GAP SERPL CALCULATED.3IONS-SCNC: 4 MMOL/L (ref 3–14)
APPEARANCE UR: CLEAR
AST SERPL W P-5'-P-CCNC: 30 U/L (ref 0–45)
BASOPHILS # BLD AUTO: 0 10E9/L (ref 0–0.2)
BASOPHILS NFR BLD AUTO: 0.2 %
BILIRUB SERPL-MCNC: 0.4 MG/DL (ref 0.2–1.3)
BILIRUB UR QL STRIP: NEGATIVE
BUN SERPL-MCNC: 8 MG/DL (ref 7–30)
CALCIUM SERPL-MCNC: 9.6 MG/DL (ref 8.5–10.1)
CHLORIDE SERPL-SCNC: 105 MMOL/L (ref 94–109)
CO2 SERPL-SCNC: 27 MMOL/L (ref 20–32)
COLOR UR AUTO: YELLOW
CREAT SERPL-MCNC: 0.78 MG/DL (ref 0.52–1.04)
DIFFERENTIAL METHOD BLD: NORMAL
EOSINOPHIL # BLD AUTO: 0 10E9/L (ref 0–0.7)
EOSINOPHIL NFR BLD AUTO: 0.3 %
ERYTHROCYTE [DISTWIDTH] IN BLOOD BY AUTOMATED COUNT: 13.1 % (ref 10–15)
GFR SERPL CREATININE-BSD FRML MDRD: >90 ML/MIN/{1.73_M2}
GLUCOSE SERPL-MCNC: 84 MG/DL (ref 70–99)
GLUCOSE UR STRIP-MCNC: NEGATIVE MG/DL
HCT VFR BLD AUTO: 35.1 % (ref 35–47)
HGB BLD-MCNC: 11.9 G/DL (ref 11.7–15.7)
HGB UR QL STRIP: NEGATIVE
IMM GRANULOCYTES # BLD: 0 10E9/L (ref 0–0.4)
IMM GRANULOCYTES NFR BLD: 0.3 %
INR PPP: 1.1 (ref 0.86–1.14)
KETONES UR STRIP-MCNC: >150 MG/DL
LABORATORY COMMENT REPORT: NORMAL
LEUKOCYTE ESTERASE UR QL STRIP: NEGATIVE
LYMPHOCYTES # BLD AUTO: 1.6 10E9/L (ref 0.8–5.3)
LYMPHOCYTES NFR BLD AUTO: 27.8 %
MCH RBC QN AUTO: 30.5 PG (ref 26.5–33)
MCHC RBC AUTO-ENTMCNC: 33.9 G/DL (ref 31.5–36.5)
MCV RBC AUTO: 90 FL (ref 78–100)
MONOCYTES # BLD AUTO: 0.4 10E9/L (ref 0–1.3)
MONOCYTES NFR BLD AUTO: 6.1 %
MUCOUS THREADS #/AREA URNS LPF: PRESENT /LPF
NEUTROPHILS # BLD AUTO: 3.8 10E9/L (ref 1.6–8.3)
NEUTROPHILS NFR BLD AUTO: 65.3 %
NITRATE UR QL: NEGATIVE
NRBC # BLD AUTO: 0 10*3/UL
NRBC BLD AUTO-RTO: 0 /100
PH UR STRIP: 5.5 PH (ref 5–7)
PLATELET # BLD AUTO: 218 10E9/L (ref 150–450)
POTASSIUM SERPL-SCNC: 3.9 MMOL/L (ref 3.4–5.3)
PROT SERPL-MCNC: 8.2 G/DL (ref 6.8–8.8)
RBC # BLD AUTO: 3.9 10E12/L (ref 3.8–5.2)
RBC #/AREA URNS AUTO: 1 /HPF (ref 0–2)
SARS-COV-2 RNA RESP QL NAA+PROBE: NEGATIVE
SODIUM SERPL-SCNC: 136 MMOL/L (ref 133–144)
SOURCE: ABNORMAL
SP GR UR STRIP: 1.03 (ref 1–1.03)
SPECIMEN SOURCE: NORMAL
SQUAMOUS #/AREA URNS AUTO: <1 /HPF (ref 0–1)
UROBILINOGEN UR STRIP-MCNC: NORMAL MG/DL (ref 0–2)
WBC # BLD AUTO: 5.9 10E9/L (ref 4–11)
WBC #/AREA URNS AUTO: 3 /HPF (ref 0–5)

## 2021-06-22 PROCEDURE — G0378 HOSPITAL OBSERVATION PER HR: HCPCS

## 2021-06-22 PROCEDURE — C9113 INJ PANTOPRAZOLE SODIUM, VIA: HCPCS | Performed by: EMERGENCY MEDICINE

## 2021-06-22 PROCEDURE — 258N000003 HC RX IP 258 OP 636: Performed by: EMERGENCY MEDICINE

## 2021-06-22 PROCEDURE — 250N000013 HC RX MED GY IP 250 OP 250 PS 637: Performed by: EMERGENCY MEDICINE

## 2021-06-22 PROCEDURE — 250N000011 HC RX IP 250 OP 636: Performed by: EMERGENCY MEDICINE

## 2021-06-22 PROCEDURE — 96361 HYDRATE IV INFUSION ADD-ON: CPT

## 2021-06-22 PROCEDURE — 99213 OFFICE O/P EST LOW 20 MIN: CPT | Performed by: OBSTETRICS & GYNECOLOGY

## 2021-06-22 PROCEDURE — 96375 TX/PRO/DX INJ NEW DRUG ADDON: CPT

## 2021-06-22 PROCEDURE — 36415 COLL VENOUS BLD VENIPUNCTURE: CPT | Performed by: EMERGENCY MEDICINE

## 2021-06-22 PROCEDURE — 85610 PROTHROMBIN TIME: CPT | Performed by: EMERGENCY MEDICINE

## 2021-06-22 PROCEDURE — 250N000011 HC RX IP 250 OP 636: Performed by: OBSTETRICS & GYNECOLOGY

## 2021-06-22 PROCEDURE — 87635 SARS-COV-2 COVID-19 AMP PRB: CPT | Performed by: EMERGENCY MEDICINE

## 2021-06-22 PROCEDURE — 96374 THER/PROPH/DIAG INJ IV PUSH: CPT

## 2021-06-22 RX ORDER — HYDROXYZINE HYDROCHLORIDE 50 MG/1
50 TABLET, FILM COATED ORAL EVERY 6 HOURS PRN
Status: DISCONTINUED | OUTPATIENT
Start: 2021-06-22 | End: 2021-06-22 | Stop reason: HOSPADM

## 2021-06-22 RX ORDER — PROMETHAZINE HYDROCHLORIDE 25 MG/1
25 TABLET ORAL ONCE
Status: DISCONTINUED | OUTPATIENT
Start: 2021-06-22 | End: 2021-06-22

## 2021-06-22 RX ORDER — CALCIUM CARBONATE 500 MG/1
500 TABLET, CHEWABLE ORAL DAILY PRN
Status: DISCONTINUED | OUTPATIENT
Start: 2021-06-22 | End: 2021-06-22 | Stop reason: HOSPADM

## 2021-06-22 RX ORDER — ONDANSETRON 2 MG/ML
4 INJECTION INTRAMUSCULAR; INTRAVENOUS EVERY 8 HOURS PRN
Status: DISCONTINUED | OUTPATIENT
Start: 2021-06-22 | End: 2021-06-22 | Stop reason: HOSPADM

## 2021-06-22 RX ORDER — ACETAMINOPHEN 325 MG/1
650 TABLET ORAL EVERY 4 HOURS PRN
Status: DISCONTINUED | OUTPATIENT
Start: 2021-06-22 | End: 2021-06-22 | Stop reason: HOSPADM

## 2021-06-22 RX ORDER — METOCLOPRAMIDE HYDROCHLORIDE 5 MG/ML
10 INJECTION INTRAMUSCULAR; INTRAVENOUS EVERY 6 HOURS
Status: DISCONTINUED | OUTPATIENT
Start: 2021-06-22 | End: 2021-06-22 | Stop reason: HOSPADM

## 2021-06-22 RX ORDER — DEXTROSE, SODIUM CHLORIDE, SODIUM LACTATE, POTASSIUM CHLORIDE, AND CALCIUM CHLORIDE 5; .6; .31; .03; .02 G/100ML; G/100ML; G/100ML; G/100ML; G/100ML
INJECTION, SOLUTION INTRAVENOUS CONTINUOUS
Status: DISCONTINUED | OUTPATIENT
Start: 2021-06-22 | End: 2021-06-22 | Stop reason: HOSPADM

## 2021-06-22 RX ORDER — PROMETHAZINE HYDROCHLORIDE 25 MG/1
25 SUPPOSITORY RECTAL ONCE
Status: COMPLETED | OUTPATIENT
Start: 2021-06-22 | End: 2021-06-22

## 2021-06-22 RX ORDER — PYRIDOXINE HCL (VITAMIN B6) 25 MG
25 TABLET ORAL 3 TIMES DAILY
Qty: 90 TABLET | Refills: 0 | Status: ON HOLD | OUTPATIENT
Start: 2021-06-22 | End: 2021-11-01

## 2021-06-22 RX ORDER — PROMETHAZINE HYDROCHLORIDE 12.5 MG/1
12.5 SUPPOSITORY RECTAL EVERY 6 HOURS PRN
Qty: 24 SUPPOSITORY | Refills: 1 | Status: ON HOLD | OUTPATIENT
Start: 2021-06-22 | End: 2021-11-01

## 2021-06-22 RX ORDER — ONDANSETRON 2 MG/ML
4 INJECTION INTRAMUSCULAR; INTRAVENOUS
Status: DISCONTINUED | OUTPATIENT
Start: 2021-06-22 | End: 2021-06-22

## 2021-06-22 RX ORDER — SODIUM CHLORIDE 9 MG/ML
1000 INJECTION, SOLUTION INTRAVENOUS CONTINUOUS
Status: DISCONTINUED | OUTPATIENT
Start: 2021-06-22 | End: 2021-06-22

## 2021-06-22 RX ORDER — ACETAMINOPHEN 325 MG/1
325-650 TABLET ORAL EVERY 6 HOURS PRN
Status: ON HOLD | COMMUNITY
End: 2021-11-01

## 2021-06-22 RX ORDER — LIDOCAINE 40 MG/G
CREAM TOPICAL
Status: DISCONTINUED | OUTPATIENT
Start: 2021-06-22 | End: 2021-06-22 | Stop reason: HOSPADM

## 2021-06-22 RX ORDER — METOCLOPRAMIDE HYDROCHLORIDE 5 MG/ML
10 INJECTION INTRAMUSCULAR; INTRAVENOUS ONCE
Status: DISCONTINUED | OUTPATIENT
Start: 2021-06-22 | End: 2021-06-22

## 2021-06-22 RX ADMIN — PANTOPRAZOLE SODIUM 40 MG: 40 INJECTION, POWDER, FOR SOLUTION INTRAVENOUS at 03:29

## 2021-06-22 RX ADMIN — SODIUM CHLORIDE 1000 ML: 9 INJECTION, SOLUTION INTRAVENOUS at 00:34

## 2021-06-22 RX ADMIN — PROMETHAZINE HYDROCHLORIDE 25 MG: 25 SUPPOSITORY RECTAL at 03:42

## 2021-06-22 RX ADMIN — ONDANSETRON 4 MG: 2 INJECTION INTRAMUSCULAR; INTRAVENOUS at 01:44

## 2021-06-22 RX ADMIN — SODIUM CHLORIDE, SODIUM LACTATE, POTASSIUM CHLORIDE, CALCIUM CHLORIDE AND DEXTROSE MONOHYDRATE: 5; 600; 310; 30; 20 INJECTION, SOLUTION INTRAVENOUS at 10:22

## 2021-06-22 RX ADMIN — SODIUM CHLORIDE, SODIUM LACTATE, POTASSIUM CHLORIDE, CALCIUM CHLORIDE AND DEXTROSE MONOHYDRATE: 5; 600; 310; 30; 20 INJECTION, SOLUTION INTRAVENOUS at 03:32

## 2021-06-22 ASSESSMENT — MIFFLIN-ST. JEOR: SCORE: 1390.37

## 2021-06-22 ASSESSMENT — ENCOUNTER SYMPTOMS
BLOOD IN STOOL: 0
NAUSEA: 1
ABDOMINAL PAIN: 0
SHORTNESS OF BREATH: 0
VOMITING: 1

## 2021-06-22 NOTE — PLAN OF CARE
ROOM # 213    Living Situation (if not independent, order SW consult): Home with family  Facility name:  : -[facility]    Activity level at baseline:Ind  Activity level on admit: Ind      Patient registered to observation; given Patient Bill of Rights; given the opportunity to ask questions about observation status and their plan of care.  Patient has been oriented to the observation room, bathroom and call light is in place.    Discussed discharge goals and expectations with patient/family.

## 2021-06-22 NOTE — PHARMACY-ADMISSION MEDICATION HISTORY
Admission medication history interview status for this patient is complete. See [facility] admission navigator for allergy information, prior to admission medications and immunization status.     Medication history interview done, indicate source(s): Patient  Medication history resources (including written lists, pill bottles, clinic record):Online-OR dispensing records  Pharmacy: [id number] [place], [place] - [address] [place] [address]        Changes made to PTA medication list:  Added: Tylenol  Deleted: None  Changed: None    Actions taken by pharmacist (provider contacted, etc): left a sticky note for MD     Additional medication history information:None    Medication reconciliation/reorder completed by provider prior to medication history?  N    Prior to Admission medications    Medication Sig Last Dose Taking? Auth Provider   acetaminophen (TYLENOL) 325 MG tablet Take 325-650 mg by mouth every 6 hours as needed for mild pain  at PRN Yes Unknown, Entered By History   famotidine (PEPCID) 20 MG tablet Take 1 tablet (20 mg) by mouth 2 times daily 6/21/2021 at Unknown time Yes Tanisha Oviedo MD   ondansetron (ZOFRAN ODT) 4 MG ODT tab Take 1 tablet (4 mg) by mouth every 6 hours as needed for nausea or vomiting  at PRN Yes Horace Mixon PA-C   Prenatal Vit-Fe Fumarate-FA (PRENATAL 19 PO) Take 1 tablet by mouth daily  6/21/2021 at Unknown time Yes Reported, Patient         
No

## 2021-06-22 NOTE — ED PROVIDER NOTES
"  History     Chief Complaint:    Hematemesis      HPI   Olimpia Herrera is a 30 year old  female at 8w1d who presents with nausea and vomiting for the past 4 days with inability to keep any food/fluid down.  Yesterday and today, she noticed flecks of bright right blood in her vomit.  She denies any yuliet blood vomiting or bloody/black stools.  She has burning in her chest from the vomiting but denies any chest pain, shortness of breath, abdominal pain, vaginal bleeding/discharge, urinary symptoms.  She has tried Zofran (last dose 2 PM) without relief.  She has had issues with hyperemesis with her previous pregnancies.    Review of Systems   Respiratory: Negative for shortness of breath.    Cardiovascular: Negative for chest pain.        Burning in chest   Gastrointestinal: Positive for nausea and vomiting (with blood). Negative for abdominal pain and blood in stool.   Genitourinary: Negative.  Negative for vaginal bleeding and vaginal discharge.   All other systems reviewed and are negative.        Allergies:  Compazine [Prochlorperazine]  Zantac [Ranitidine]    Medications:    Pepcid  Zofran  Prenatal    Past Medical History:    Anemia  Constipation  Eczema  Migraines  Varicella  GERD    Past Surgical History:    Appendectomy    Family History:    Mother: hypertension    Social History:  Patient arrives alone.    Physical Exam     Patient Vitals for the past 24 hrs:   BP Temp Temp src Pulse Resp SpO2 Height Weight   21 0727 107/53 98.5  F (36.9  C) Oral 51 16 100 % -- --   21 0614 126/66 99.3  F (37.4  C) Oral 58 16 100 % 1.575 m (5' 2\") 72.7 kg (160 lb 4.8 oz)   21 0600 -- -- -- -- -- 100 % -- --   21 0545 -- -- -- -- -- 100 % -- --   21 0530 -- -- -- -- -- 100 % -- --   21 0515 -- -- -- -- -- 100 % -- --   21 0500 -- -- -- -- -- 100 % -- --   21 0445 -- -- -- -- -- 100 % -- --   21 -- -- -- -- -- 100 % -- --   210 125/75 -- -- -- -- -- -- " --   06/22/21 0330 -- -- -- -- -- 100 % -- --   06/22/21 0320 -- -- -- -- -- 100 % -- --   06/22/21 0310 -- -- -- -- -- 100 % -- --   06/22/21 0300 -- -- -- -- -- 100 % -- --   06/22/21 0245 -- -- -- -- -- 100 % -- --   06/22/21 0237 121/74 -- -- 64 -- 100 % -- --   06/22/21 0215 -- -- -- -- -- 100 % -- --   06/22/21 0200 -- -- -- -- -- 99 % -- --   06/21/21 2210 127/74 98.3  F (36.8  C) Oral 57 16 99 % -- --       Physical Exam  General: Alert, no acute distress  HEENT:  Dry mucous membranes.  Conjunctiva normal.   CV:  RRR, no m/r/g, skin warm and well perfused  Pulm:  CTAB, no wheezes/ronchi/rales.  No acute distress, breathing comfortably  GI:  Soft, nontender, nondistended.  No rebound or guarding.  Normal bowel sounds  MSK:  Moving all extremities.  No focal areas of edema, erythema, or tenderness  Skin:  WWP, no rashes, no lower extremity edema, skin color normal, no diaphoresis    Emergency Department Course       Imaging:  No orders to display       Laboratory:  Asymptomatic COVID19 Virus PCR by nasopharyngeal swab: negative  INR: 1.10    CBC: WBC 5.9, HGB 11.9,    CMP: AWNL (Creatinine 0.78)     UA with microscopic: Urineketon >150, Protein albumin 20, Mucous present o/w WNL       Procedures:  None    Emergency Department Course:    Reviewed:    I reviewed nursing notes, vitals, past history and care everywhere    Assessments:   I obtained history and examined the patient as noted above.    I rechecked the patient and explained findings.       Consults:   I consulted Dr. He of the OB/GYN service and discussed patient presentation, laboratory results, plan of care.         Interventions:    Medications   dextrose 5% in lactated ringers infusion ( Intravenous New Bag 6/22/21 0332)   lidocaine 1 % 1 mL (has no administration in time range)   lidocaine (LMX4) cream (has no administration in time range)   sodium chloride (PF) 0.9% PF flush 3 mL (has no administration in time range)   sodium chloride  (PF) 0.9% PF flush 3 mL (3 mLs Intracatheter Not Given 21 0635)   NO Rho (D) immune globulin (RhoGam) needed - mother Rh POSITIVE (has no administration in time range)   dextrose 5% in lactated ringers infusion (has no administration in time range)   acetaminophen (TYLENOL) tablet 650 mg (has no administration in time range)   hydrOXYzine (ATARAX) tablet 50 mg (has no administration in time range)   metoclopramide (REGLAN) injection 10 mg (10 mg Intravenous Not Given 21 0636)   calcium carbonate (TUMS) chewable tablet 500 mg (has no administration in time range)   ondansetron (ZOFRAN) injection 4 mg (has no administration in time range)   0.9% sodium chloride BOLUS (0 mLs Intravenous Stopped 21 0336)   pantoprazole (PROTONIX) IV push injection 40 mg (40 mg Intravenous Given 21 0329)   promethazine (PHENERGAN) Suppository 25 mg (25 mg Rectal Given 21 0342)       Disposition:  The patient was admitted to the hospital under the care of Dr. He.    Impression & Plan        Medical Decision Making:  Olimpia Herrera is a 30 year old female  at 8w1d who presents to the ER for evaluation of persistent nausea/vomiting over the last 4 days despite Zofran.  Also noted small flecks of bright red blood in her vomit.  She is vitally stable.  Denies any vaginal bleeding or cramping.  Lab studies remarkable for urine ketones.  Electrolytes kidney function are normal.  Mild generalized abdominal tenderness likely from nausea/vomiting.  No focal tenderness to suggest appendicitis, diverticulitis, surgical abdomen or intra-abdominal catastrophe.  No indication for imaging at this time.  Unfortunately, despite the above interventions, patient remained unable to tolerate oral liquid.  Suspect slight Jennifer-Hall tear causing hematemesis.  No signs to suggest [place].  Discussed case with Dr. He of OB/GYN service and patient will be admitted under observation status for ongoing symptomatic care for  hyperemesis gravidarum.  Patient remained stable in the ER.    Covid-19  Olimpia Herrera was evaluated during a global COVID-19 pandemic, which necessitated consideration that the patient might be at risk for infection with the SARS-CoV-2 virus that causes COVID-19.   Applicable protocols for evaluation were followed during the patient's care.   COVID-19 was considered as part of the patient's evaluation. The plan for testing is:  a test was obtained during this visit.      Diagnosis:    ICD-10-CM    1. Hyperemesis gravidarum  O21.0 UA with Microscopic     INR     Asymptomatic SARS-CoV-2 COVID-19 Virus (Coronavirus) by PCR   2. Jennifer-Hall tear  K22.6    3. Hematemesis with nausea  K92.0        Discharge Medications:  Current Discharge Medication List            Scribe Disclosure:  Chiki JASSO MD, am serving as a scribe at 11:59 PM on 6/21/2021 to document services personally performed by Chiki Andrews MD based on my observations and the provider's statements to me.      Chiki Andrews MD  06/22/21 6039

## 2021-06-22 NOTE — PLAN OF CARE
PRIMARY DIAGNOSIS: Nausea/Vomiting    OUTPATIENT/OBSERVATION GOALS TO BE MET BEFORE DISCHARGE  1. Orthostatic performed: No    2. Tolerating PO fluid and/or antibiotics (if applicable): Yes    3. Nausea/Vomiting/Diarrhea symptoms improved: Yes    4. Pain status: States her neck is sore from vomiting    5. Return to near baseline physical activity: Yes    Discharge Planner Nurse   Safe discharge environment identified: Yes  Barriers to discharge: Yes       Entered by: Tyler Dyson 06/22/2021     Pt alert and oriented x4. She states she has soreness in her neck from vomiting. Has been sleeping this morning. Denies nausea now. Has eaten ice chips. Up indep. D5 w/ LR running. Will cont supportive cares.  Please review provider order for any additional goals.   Nurse to notify provider when observation goals have been met and patient is ready for discharge.

## 2021-06-22 NOTE — DISCHARGE SUMMARY
[facility] Antepartum History and Physical    Olimpia Herrera MRN# [id number]   Age: 30 year old YOB: 1990     Date of Admission:  2021    Primary care provider: not listed. Plans CNM care this pregnancy.           Chief Complaint:   Olimpia Herrera is a 30 year old female who is  pregnant female at 8w2d  being admitted for hyperemesis and dehydration, for observation. Four days of nausea and vomiting at home, history HG in other pregnancies, requiring IVF on multiple occasions. Usually is better by 20-24 weeks. Using Zofran as needed at home, not on vit B6, did not like the way she felt with Reglan in the ED last night. Phenergan suppository was helpful.     After 8 hours, she feels better. Asking to eat, tolerated food her partner brought in. No vomiting. Throat feels sore from vomiting.          Pregnancy history:     OBSTETRIC HISTORY:    OB History    Para Term  AB Living   4 2 2 0 1 2   SAB TAB Ectopic Multiple Live Births   1 0 0 0 2      # Outcome Date GA Lbr Christiano/2nd Weight Sex Delivery Anes PTL Lv   4 Current            3 Term 10/11/18 38w4d 08:05 / 00:33 3.79 kg (8 lb 5.7 oz) M Vag-Spont EPI N FERNANDO      Name: LUIS MANUEL PALM      Apgar1: 9  Apgar5: 9   2 Term 16 39w3d 05:01 / 00:32 3.155 kg (6 lb 15.3 oz) M Vag-Spont EPI N FERNANDO      Name: NERY HERRERA      Apgar1: 9  Apgar5: 9   1 SAB 08/13/15               EDC: Estimated Date of Delivery: 2022    Prenatal Labs:   Lab Results   Component Value Date    ABO A 2021    RH Pos 2021    AS Neg 2021    HEPBANG Nonreactive 03/15/2018    CHPCRT Negative 2018    GCPCRT Negative 2018    TREPAB Negative 03/15/2018    HGB 11.9 2021       GBS Status:   Lab Results   Component Value Date    GBS Negative 2018       Active Problem List  Patient Active Problem List   Diagnosis     Eczema, dyshidrotic     Constipation     Migraine     Gastroesophageal  "reflux disease without esophagitis     Hyperemesis gravidarum     Jennifer-Hall tear       Medication Prior to Admission  Medications Prior to Admission   Medication Sig Dispense Refill Last Dose     acetaminophen (TYLENOL) 325 MG tablet Take 325-650 mg by mouth every 6 hours as needed for mild pain    at PRN     famotidine (PEPCID) 20 MG tablet Take 1 tablet (20 mg) by mouth 2 times daily 90 tablet 1 6/21/2021 at Unknown time     ondansetron (ZOFRAN ODT) 4 MG ODT tab Take 1 tablet (4 mg) by mouth every 6 hours as needed for nausea or vomiting 10 tablet 0  at PRN     Prenatal Vit-Fe Fumarate-FA (PRENATAL 19 PO) Take 1 tablet by mouth daily    6/21/2021 at Unknown time   .        Maternal Past Medical History:     Past Medical History:   Diagnosis Date     Anemia      Constipation      Eczema, dyshidrotic      Migraine      Migraines      Varicella                        Family History:   I have reviewed this patient's family history            Social History:   I have reviewed this patient's social history and commented on significant items within the HPI         Review of Systems:   The Review of Systems is negative other than noted in the HPI          Physical Exam:     Vitals:    06/22/21 0600 06/22/21 0614 06/22/21 0727 06/22/21 1120   BP:  126/66 107/53 112/61   BP Location:  Left arm Left arm Left arm   Pulse:  58 51 60   Resp:  16 16 16   Temp:  99.3  F (37.4  C) 98.5  F (36.9  C) 98.9  F (37.2  C)   TempSrc:  Oral Oral Oral   SpO2: 100% 100% 100% 100%   Weight:  72.7 kg (160 lb 4.8 oz)     Height:  1.575 m (5' 2\")       Gen: resting comfortably, in no acute distress  CV: regular rate, well perfused  Pulm: non-labored breathing, no cough  Abd: gravid, soft, no ttp, no rebound  Skin: warm and dry, no rashes/lesions  Psych: appropriate affect  Neuro: A+Ox3   Ultrasound Exam:last done on 6/18, essentially unremarkable. Size consistent with dates, miller intrauterine pregnancy.                 Results for " orders placed or performed during the hospital encounter of 21 (from the past 24 hour(s))   CBC + differential   Result Value Ref Range    WBC 5.9 4.0 - 11.0 10e9/L    RBC Count 3.90 3.8 - 5.2 10e12/L    Hemoglobin 11.9 11.7 - 15.7 g/dL    Hematocrit 35.1 35.0 - 47.0 %    MCV 90 78 - 100 fl    MCH 30.5 26.5 - 33.0 pg    MCHC 33.9 31.5 - 36.5 g/dL    RDW 13.1 10.0 - 15.0 %    Platelet Count 218 150 - 450 10e9/L    Diff Method Automated Method     % Neutrophils 65.3 %    % Lymphocytes 27.8 %    % Monocytes 6.1 %    % Eosinophils 0.3 %    % Basophils 0.2 %    % Immature Granulocytes 0.3 %    Nucleated RBCs 0 0 /100    Absolute Neutrophil 3.8 1.6 - 8.3 10e9/L    Absolute Lymphocytes 1.6 0.8 - 5.3 10e9/L    Absolute Monocytes 0.4 0.0 - 1.3 10e9/L    Absolute Eosinophils 0.0 0.0 - 0.7 10e9/L    Absolute Basophils 0.0 0.0 - 0.2 10e9/L    Abs Immature Granulocytes 0.0 0 - 0.4 10e9/L    Absolute Nucleated RBC 0.0    Comprehensive metabolic panel   Result Value Ref Range    Sodium 136 133 - 144 mmol/L    Potassium 3.9 3.4 - 5.3 mmol/L    Chloride 105 94 - 109 mmol/L    Carbon Dioxide 27 20 - 32 mmol/L    Anion Gap 4 3 - 14 mmol/L    Glucose 84 70 - 99 mg/dL    Urea Nitrogen 8 7 - 30 mg/dL    Creatinine 0.78 0.52 - 1.04 mg/dL    GFR Estimate >90 >60 mL/min/[1.73_m2]    GFR Estimate If Black >90 >60 mL/min/[1.73_m2]    Calcium 9.6 8.5 - 10.1 mg/dL    Bilirubin Total 0.4 0.2 - 1.3 mg/dL    Albumin 4.3 3.4 - 5.0 g/dL    Protein Total 8.2 6.8 - 8.8 g/dL    Alkaline Phosphatase 41 40 - 150 U/L    ALT 40 0 - 50 U/L    AST 30 0 - 45 U/L   INR   Result Value Ref Range    INR 1.10 0.86 - 1.14   Asymptomatic SARS-CoV-2 COVID-19 Virus (Coronavirus) by PCR    Specimen: Nasopharyngeal   Result Value Ref Range    SARS-CoV-2 Virus Specimen Source Nasopharyngeal     SARS-CoV-2 PCR Result NEGATIVE     SARS-CoV-2 PCR Comment (Note)            Assessment:   Olimpia Herrera is a  at 8w2d admitted with hyperemesis and dehydration. Now  improved status post IVF, zofran, phenergan.          Plan:   1. Discharge to home with prescription for vitamin B6 and phenergan.  2. Discussed measures for minimizing nausea and vomiting of pregnancy at home.  3. Has appointment with CNM for 6/30, will call the clinic if she is feeling poorly prior to this. Ultimate goal is not to need the ED. May need addition of further outpatient meds, or to have infusion therapy scheduled as an outpatient.    All discussed in detail. She is in agreement with this plan.    Funmilayo Rogers MD  Obstetrics and Gynecology

## 2021-06-22 NOTE — PLAN OF CARE
"PRIMARY DIAGNOSIS: Nausea/Vomiting    OUTPATIENT/OBSERVATION GOALS TO BE MET BEFORE DISCHARGE  1. Orthostatic performed: No    2. Tolerating PO fluid and/or antibiotics (if applicable): Yes    3. Nausea/Vomiting/Diarrhea symptoms improved: Yes    4. Pain status: States her neck/throat is sore from vomiting    5. Return to near baseline physical activity: Yes    Discharge Planner Nurse   Safe discharge environment identified: Yes  Barriers to discharge: Yes       Entered by: Tyler Dyson 06/22/2021     Pt alert and oriented x4. Denies nausea. No vomiting today. Has eaten regular diet. Up indep. D5 w/ LR running. Will cont supportive cares. Possible discharge home today.  /65 (BP Location: Left arm)   Pulse 65   Temp 97.9  F (36.6  C) (Oral)   Resp 16   Ht 1.575 m (5' 2\")   Wt 72.7 kg (160 lb 4.8 oz)   LMP 04/25/2021 (Exact Date)   SpO2 100%   BMI 29.32 kg/m      Please review provider order for any additional goals.   Nurse to notify provider when observation goals have been met and patient is ready for discharge.  "

## 2021-06-22 NOTE — DISCHARGE INSTRUCTIONS
For nausea and vomiting in pregnancy:     Start by eating a little something before getting out of bed in the morning and continue with snacks every 2 hours throughout the day. Be sure to drink plenty of water. It is helpful to take small sips of water, about a tablespoon at a time, every 10-15 minutes rather than drinking a lot at one time.    You may use pyridoxine (vitamin B6), 25 mg by mouth every 6-8 hours. Start using phenergan suppositories as needed, up to 4 times a day or every 6 hours. Continue to use your Zofran as you were prior to admission.    If your symptoms are worse prior to your scheduled appointment, please contact the clinic and we can try other options.     Contact the [facility]YN clinic at [phone] to schedule appointments for clinic visits and ultrasounds, or with questions or concerns that you do not wish to or cannot address through [employer].

## 2021-06-22 NOTE — ED NOTES
Pt put on call light d/t IV being painful. Nurse flushed IV and it worked well. Told patient it worked well but patient continued to moan and insisted nurse took IV out. Pt refusing new IV at this time. Nurse pulled IV per patient's request.

## 2021-06-22 NOTE — PLAN OF CARE
Patient's After Visit Summary was reviewed with patient and/or spouse.   Patient verbalized understanding of After Visit Summary, recommended follow up and was given an opportunity to ask questions.   Discharge medications sent home with patient/family: yes   Discharged with spouse    AVS reviewed w/ pt and questions answered.  OBSERVATION patient END time: 1400

## 2021-06-22 NOTE — ED TRIAGE NOTES
Patient presents with vomiting in pregnancy. Currently 8 weeks pregnant. Reports vomiting since Thursday. Was seen here for this on Saturday and sent home with zofran, but reports this is not helping. States that she started vomiting up bright red blood yesterday and today has been having clots in her emesis. Difficulty handling secretions.

## 2021-06-22 NOTE — ED NOTES
[facility]  ED Nurse Handoff Report    Olimpia Herrera is a 30 year old female   ED Chief complaint: Hematemesis  . ED Diagnosis:   Final diagnoses:   Hyperemesis gravidarum   Jennifer-Hall tear     Allergies:   Allergies   Allergen Reactions     Compazine [Prochlorperazine] Swelling     Swelling of tongue     Zantac [Ranitidine] Other (See Comments)     IV Zantac caused : Anxiety       Code Status: Full Code  Activity level - Baseline/Home:  Independent. Activity Level - Current:   Stand by Assist. Lift room needed: No. Bariatric: No   Needed: No   Isolation: No. Infection: Not Applicable.     Vital Signs:   Vitals:    21 0320 21 0330 21 0430 21 0440   BP:   125/75    Pulse:       Resp:       Temp:       TempSrc:       SpO2: 100% 100%  100%       Cardiac Rhythm:  ,      Pain level:    Patient confused: No. Patient Falls Risk: No.   Elimination Status: Has voided   Patient Report - Initial Complaint: Patient presents with vomiting in pregnancy. Currently 8 weeks pregnant. Reports vomiting since Thursday. Was seen here for this on Saturday and sent home with zofran, but reports this is not helping. States that she started vomiting up bright red blood yesterday and today has been having clots in her emesis. Difficulty handling secretions.    Focused Assessment:  Olimpia Herrera is a 30 year old  female at 8w1d who presents with nausea and vomiting for the past 4 days with inability to keep any food/fluid down.  Yesterday and today, she noticed flecks of bright right blood in her vomit.  She denies any yuliet blood vomiting or bloody/black stools.  She has burning in her chest from the vomiting but denies any chest pain, shortness of breath, abdominal pain, vaginal bleeding/discharge, urinary symptoms.  She has tried Zofran (last dose 2 PM) without relief.  She has had issues with hyperemesis with her previous pregnancies.  Tests Performed:   No orders to display      . Abnormal Results:   Labs Ordered and Resulted from Time of ED Arrival Up to the Time of Departure from the ED   ROUTINE UA WITH MICROSCOPIC - Abnormal; Notable for the following components:       Result Value    Ketones Urine >150 (*)     Protein Albumin Urine 20 (*)     Mucous Urine Present (*)     All other components within normal limits   CBC WITH PLATELETS DIFFERENTIAL   COMPREHENSIVE METABOLIC PANEL   INR   SARS-COV-2 (COVID-19) VIRUS RT-PCR   PERIPHERAL IV CATHETER   ASSIGN ATTENDING PROVIDER   OBSERVATION GOALS   ASSESS   VITAL SIGNS   VITAL SIGNS   OBTAIN   INTAKE AND OUTPUT   NOTIFY PROVIDER   DISCHARGE INSTRUCTIONS   DISCHARGE PLANNING   PERIPHERAL IV CATHETER   ACTIVITY     .   Treatments provided: see MAR  Family Comments: patient on the phone  OBS brochure/video discussed/provided to patient:  Yes  ED Medications:   Medications   dextrose 5% in lactated ringers infusion ( Intravenous New Bag 6/22/21 0332)   lidocaine 1 % 1 mL (has no administration in time range)   lidocaine (LMX4) cream (has no administration in time range)   sodium chloride (PF) 0.9% PF flush 3 mL (has no administration in time range)   sodium chloride (PF) 0.9% PF flush 3 mL (has no administration in time range)   NO Rho (D) immune globulin (RhoGam) needed - mother Rh POSITIVE (has no administration in time range)   dextrose 5% in lactated ringers infusion (has no administration in time range)   acetaminophen (TYLENOL) tablet 650 mg (has no administration in time range)   hydrOXYzine (ATARAX) tablet 50 mg (has no administration in time range)   metoclopramide (REGLAN) injection 10 mg (has no administration in time range)   calcium carbonate (TUMS) chewable tablet 500 mg (has no administration in time range)   ondansetron (ZOFRAN) injection 4 mg (has no administration in time range)   0.9% sodium chloride BOLUS (0 mLs Intravenous Stopped 6/22/21 0336)   pantoprazole (PROTONIX) IV push injection 40 mg (40 mg Intravenous Given  6/22/21 0329)   promethazine (PHENERGAN) Suppository 25 mg (25 mg Rectal Given 6/22/21 0342)     Drips infusing:  Yes  For the majority of the shift, the patient's behavior Yellow. Interventions performed were support and reassurance.    Sepsis treatment initiated: No     Patient tested for COVID 19 prior to admission: YES    ED Nurse Name/Phone Number: Leigh Woo RN,   4:43 AM    RECEIVING UNIT ED HANDOFF REVIEW    Above ED Nurse Handoff Report was reviewed: Yes  Reviewed by: Catrachita Pitts RN on June 22, 2021 at 5:58 AM

## 2021-06-22 NOTE — H&P
[facility] Antepartum History and Physical    Olimpia Herrera MRN# [id number]   Age: 30 year old YOB: 1990     Date of Admission:  2021    Primary care provider: not listed. Plans CNM care this pregnancy.           Chief Complaint:   Olimpia Herrera is a 30 year old female who is  pregnant female at 8w2d  being admitted for hyperemesis and dehydration, for observation. Four days of nausea and vomiting at home, history HG in other pregnancies, requiring IVF on multiple occasions. Usually is better by 20-24 weeks. Using Zofran as needed at home, not on vit B6, did not like the way she felt with Reglan in the ED last night. Phenergan suppository was helpful.     After 8 hours, she feels better. Asking to eat, tolerated food her partner brought in. No vomiting. Throat feels sore from vomiting.          Pregnancy history:     OBSTETRIC HISTORY:    OB History    Para Term  AB Living   4 2 2 0 1 2   SAB TAB Ectopic Multiple Live Births   1 0 0 0 2      # Outcome Date GA Lbr Christiano/2nd Weight Sex Delivery Anes PTL Lv   4 Current            3 Term 10/11/18 38w4d 08:05 / 00:33 3.79 kg (8 lb 5.7 oz) M Vag-Spont EPI N FERNANDO      Name: LUIS MANUEL PALM      Apgar1: 9  Apgar5: 9   2 Term 16 39w3d 05:01 / 00:32 3.155 kg (6 lb 15.3 oz) M Vag-Spont EPI N FERNANDO      Name: NERY HERRERA      Apgar1: 9  Apgar5: 9   1 SAB 08/13/15               EDC: Estimated Date of Delivery: 2022    Prenatal Labs:   Lab Results   Component Value Date    ABO A 2021    RH Pos 2021    AS Neg 2021    HEPBANG Nonreactive 03/15/2018    CHPCRT Negative 2018    GCPCRT Negative 2018    TREPAB Negative 03/15/2018    HGB 11.9 2021       GBS Status:   Lab Results   Component Value Date    GBS Negative 2018       Active Problem List  Patient Active Problem List   Diagnosis     Eczema, dyshidrotic     Constipation     Migraine     Gastroesophageal  "reflux disease without esophagitis     Hyperemesis gravidarum     Jennifer-Hall tear       Medication Prior to Admission  Medications Prior to Admission   Medication Sig Dispense Refill Last Dose     acetaminophen (TYLENOL) 325 MG tablet Take 325-650 mg by mouth every 6 hours as needed for mild pain    at PRN     famotidine (PEPCID) 20 MG tablet Take 1 tablet (20 mg) by mouth 2 times daily 90 tablet 1 6/21/2021 at Unknown time     ondansetron (ZOFRAN ODT) 4 MG ODT tab Take 1 tablet (4 mg) by mouth every 6 hours as needed for nausea or vomiting 10 tablet 0  at PRN     Prenatal Vit-Fe Fumarate-FA (PRENATAL 19 PO) Take 1 tablet by mouth daily    6/21/2021 at Unknown time   .        Maternal Past Medical History:     Past Medical History:   Diagnosis Date     Anemia      Constipation      Eczema, dyshidrotic      Migraine      Migraines      Varicella                        Family History:   I have reviewed this patient's family history            Social History:   I have reviewed this patient's social history and commented on significant items within the HPI         Review of Systems:   The Review of Systems is negative other than noted in the HPI          Physical Exam:     Vitals:    06/22/21 0600 06/22/21 0614 06/22/21 0727 06/22/21 1120   BP:  126/66 107/53 112/61   BP Location:  Left arm Left arm Left arm   Pulse:  58 51 60   Resp:  16 16 16   Temp:  99.3  F (37.4  C) 98.5  F (36.9  C) 98.9  F (37.2  C)   TempSrc:  Oral Oral Oral   SpO2: 100% 100% 100% 100%   Weight:  72.7 kg (160 lb 4.8 oz)     Height:  1.575 m (5' 2\")       Gen: resting comfortably, in no acute distress  CV: regular rate, well perfused  Pulm: non-labored breathing, no cough  Abd: gravid, soft, no ttp, no rebound  Skin: warm and dry, no rashes/lesions  Psych: appropriate affect  Neuro: A+Ox3   Ultrasound Exam:last done on 6/18, essentially unremarkable. Size consistent with dates, miller intrauterine pregnancy.                 Results for " orders placed or performed during the hospital encounter of 21 (from the past 24 hour(s))   CBC + differential   Result Value Ref Range    WBC 5.9 4.0 - 11.0 10e9/L    RBC Count 3.90 3.8 - 5.2 10e12/L    Hemoglobin 11.9 11.7 - 15.7 g/dL    Hematocrit 35.1 35.0 - 47.0 %    MCV 90 78 - 100 fl    MCH 30.5 26.5 - 33.0 pg    MCHC 33.9 31.5 - 36.5 g/dL    RDW 13.1 10.0 - 15.0 %    Platelet Count 218 150 - 450 10e9/L    Diff Method Automated Method     % Neutrophils 65.3 %    % Lymphocytes 27.8 %    % Monocytes 6.1 %    % Eosinophils 0.3 %    % Basophils 0.2 %    % Immature Granulocytes 0.3 %    Nucleated RBCs 0 0 /100    Absolute Neutrophil 3.8 1.6 - 8.3 10e9/L    Absolute Lymphocytes 1.6 0.8 - 5.3 10e9/L    Absolute Monocytes 0.4 0.0 - 1.3 10e9/L    Absolute Eosinophils 0.0 0.0 - 0.7 10e9/L    Absolute Basophils 0.0 0.0 - 0.2 10e9/L    Abs Immature Granulocytes 0.0 0 - 0.4 10e9/L    Absolute Nucleated RBC 0.0    Comprehensive metabolic panel   Result Value Ref Range    Sodium 136 133 - 144 mmol/L    Potassium 3.9 3.4 - 5.3 mmol/L    Chloride 105 94 - 109 mmol/L    Carbon Dioxide 27 20 - 32 mmol/L    Anion Gap 4 3 - 14 mmol/L    Glucose 84 70 - 99 mg/dL    Urea Nitrogen 8 7 - 30 mg/dL    Creatinine 0.78 0.52 - 1.04 mg/dL    GFR Estimate >90 >60 mL/min/[1.73_m2]    GFR Estimate If Black >90 >60 mL/min/[1.73_m2]    Calcium 9.6 8.5 - 10.1 mg/dL    Bilirubin Total 0.4 0.2 - 1.3 mg/dL    Albumin 4.3 3.4 - 5.0 g/dL    Protein Total 8.2 6.8 - 8.8 g/dL    Alkaline Phosphatase 41 40 - 150 U/L    ALT 40 0 - 50 U/L    AST 30 0 - 45 U/L   INR   Result Value Ref Range    INR 1.10 0.86 - 1.14   Asymptomatic SARS-CoV-2 COVID-19 Virus (Coronavirus) by PCR    Specimen: Nasopharyngeal   Result Value Ref Range    SARS-CoV-2 Virus Specimen Source Nasopharyngeal     SARS-CoV-2 PCR Result NEGATIVE     SARS-CoV-2 PCR Comment (Note)            Assessment:   Olimpia Herrera is a  at 8w2d admitted with hyperemesis and dehydration. Now  improved status post IVF, zofran, phenergan.          Plan:   1. Discharge to home with prescription for vitamin B6 and phenergan.  2. Discussed measures for minimizing nausea and vomiting of pregnancy at home.  3. Has appointment with CNM for 6/30, will call the clinic if she is feeling poorly prior to this. Ultimate goal is not to need the ED. May need addition of further outpatient meds, or to have infusion therapy scheduled as an outpatient.    All discussed in detail. She is in agreement with this plan.    Funmilayo Rogers MD  Obstetrics and Gynecology

## 2021-06-23 ENCOUNTER — TELEPHONE (OUTPATIENT)
Dept: INTERNAL MEDICINE | Facility: CLINIC | Age: 31
End: 2021-06-23

## 2021-06-23 NOTE — TELEPHONE ENCOUNTER
IP F/U    Date: 6/29/21  Diagnosis: Hyperemesis Gravidarum  Is patient active in care coordination? No  Was patient in TCU? No    Next 5 appointments (look out 90 days)    Jun 30, 2021 10:15 AM  New Prenatal with CYRUS Soler CNM  [facility] [place] ([facility] ) [address]  [address]  [facility] [address] [phone]

## 2021-06-30 ENCOUNTER — PRENATAL OFFICE VISIT (OUTPATIENT)
Dept: OBGYN | Facility: CLINIC | Age: 31
End: 2021-06-30
Payer: COMMERCIAL

## 2021-06-30 VITALS
SYSTOLIC BLOOD PRESSURE: 122 MMHG | BODY MASS INDEX: 27.84 KG/M2 | WEIGHT: 151.3 LBS | HEIGHT: 62 IN | DIASTOLIC BLOOD PRESSURE: 78 MMHG

## 2021-06-30 DIAGNOSIS — Z11.3 ROUTINE SCREENING FOR STI (SEXUALLY TRANSMITTED INFECTION): ICD-10-CM

## 2021-06-30 DIAGNOSIS — Z34.81 ENCOUNTER FOR SUPERVISION OF OTHER NORMAL PREGNANCY, FIRST TRIMESTER: Primary | ICD-10-CM

## 2021-06-30 LAB
ABO + RH BLD: NORMAL
ABO + RH BLD: NORMAL
BLD GP AB SCN SERPL QL: NORMAL
BLOOD BANK CMNT PATIENT-IMP: NORMAL
ERYTHROCYTE [DISTWIDTH] IN BLOOD BY AUTOMATED COUNT: 13.2 % (ref 10–15)
HBA1C MFR BLD: 5.5 % (ref 0–5.6)
HCT VFR BLD AUTO: 32.5 % (ref 35–47)
HGB BLD-MCNC: 11 G/DL (ref 11.7–15.7)
MCH RBC QN AUTO: 30.2 PG (ref 26.5–33)
MCHC RBC AUTO-ENTMCNC: 33.8 G/DL (ref 31.5–36.5)
MCV RBC AUTO: 89 FL (ref 78–100)
PLATELET # BLD AUTO: 188 10E9/L (ref 150–450)
RBC # BLD AUTO: 3.64 10E12/L (ref 3.8–5.2)
SPECIMEN EXP DATE BLD: NORMAL
WBC # BLD AUTO: 6.1 10E9/L (ref 4–11)

## 2021-06-30 PROCEDURE — 86803 HEPATITIS C AB TEST: CPT | Performed by: ADVANCED PRACTICE MIDWIFE

## 2021-06-30 PROCEDURE — 87591 N.GONORRHOEAE DNA AMP PROB: CPT | Performed by: ADVANCED PRACTICE MIDWIFE

## 2021-06-30 PROCEDURE — 87340 HEPATITIS B SURFACE AG IA: CPT | Performed by: ADVANCED PRACTICE MIDWIFE

## 2021-06-30 PROCEDURE — 87491 CHLMYD TRACH DNA AMP PROBE: CPT | Performed by: ADVANCED PRACTICE MIDWIFE

## 2021-06-30 PROCEDURE — 99207 PR FIRST OB VISIT: CPT | Performed by: ADVANCED PRACTICE MIDWIFE

## 2021-06-30 PROCEDURE — 36415 COLL VENOUS BLD VENIPUNCTURE: CPT | Performed by: ADVANCED PRACTICE MIDWIFE

## 2021-06-30 PROCEDURE — 86901 BLOOD TYPING SEROLOGIC RH(D): CPT | Performed by: ADVANCED PRACTICE MIDWIFE

## 2021-06-30 PROCEDURE — 86780 TREPONEMA PALLIDUM: CPT | Mod: 90 | Performed by: ADVANCED PRACTICE MIDWIFE

## 2021-06-30 PROCEDURE — 85027 COMPLETE CBC AUTOMATED: CPT | Performed by: ADVANCED PRACTICE MIDWIFE

## 2021-06-30 PROCEDURE — 87086 URINE CULTURE/COLONY COUNT: CPT | Performed by: ADVANCED PRACTICE MIDWIFE

## 2021-06-30 PROCEDURE — 86762 RUBELLA ANTIBODY: CPT | Performed by: ADVANCED PRACTICE MIDWIFE

## 2021-06-30 PROCEDURE — 83036 HEMOGLOBIN GLYCOSYLATED A1C: CPT | Performed by: ADVANCED PRACTICE MIDWIFE

## 2021-06-30 PROCEDURE — 86900 BLOOD TYPING SEROLOGIC ABO: CPT | Performed by: ADVANCED PRACTICE MIDWIFE

## 2021-06-30 PROCEDURE — 86850 RBC ANTIBODY SCREEN: CPT | Performed by: ADVANCED PRACTICE MIDWIFE

## 2021-06-30 PROCEDURE — 99000 SPECIMEN HANDLING OFFICE-LAB: CPT | Performed by: ADVANCED PRACTICE MIDWIFE

## 2021-06-30 PROCEDURE — 86787 VARICELLA-ZOSTER ANTIBODY: CPT | Performed by: ADVANCED PRACTICE MIDWIFE

## 2021-06-30 PROCEDURE — 87389 HIV-1 AG W/HIV-1&-2 AB AG IA: CPT | Performed by: ADVANCED PRACTICE MIDWIFE

## 2021-06-30 RX ORDER — PROMETHAZINE HYDROCHLORIDE 25 MG/1
SUPPOSITORY RECTAL
COMMUNITY
Start: 2021-06-28 | End: 2021-06-30

## 2021-06-30 ASSESSMENT — MIFFLIN-ST. JEOR: SCORE: 1349.54

## 2021-06-30 NOTE — NURSING NOTE
"Chief Complaint   Patient presents with     Prenatal Care     New OB, 9 weeks and 3 days       Initial /78 (BP Location: Left arm, Cuff Size: Adult Regular)   Ht 1.575 m (5' 2\")   Wt 68.6 kg (151 lb 4.8 oz)   LMP 2021 (Exact Date)   BMI 27.67 kg/m   Estimated body mass index is 27.67 kg/m  as calculated from the following:    Height as of this encounter: 1.575 m (5' 2\").    Weight as of this encounter: 68.6 kg (151 lb 4.8 oz).  BP completed using cuff size: regular    Questioned patient about current smoking habits.  Pt. has never smoked.          The following HM Due: NONE         "

## 2021-06-30 NOTE — PATIENT INSTRUCTIONS
Thank you for coming to see the Midwives at the   [facility]      We will notify you about your labs that were drawn today once we get the results back or if you have [employer] they will be posted there as well      We will call you personally with results that require further discussion      If any referrals were ordered today you should be getting a call in the next week or you may need to call the number listed with your referral to schedule.            If you need any refills of medications please call your pharmacy and they will contact us      If you have any questions or concerns before your next visit, you can reach the nurse midwife on call by calling the clinic number at [phone].      If you  wish to schedule another appointment, please call our office at [phone]. You can also make appointments through [employer]        If you have a medical emergency please call 320.    Because you are pregnant, we have additional resources for you:      You may call our consulting RN's during normal business hours for non-urgent questions about your pregnancy.      After hours you may also page the midwife on call for urgent questions or issues by calling the triage line at [phone].  There is always a midwife on call 24 hours a day.    Prenatal Reminders:    Before 14 weeks: Dating ultrasound, Genetic testing       This ultrasound helps us determine your dates accurately. Verifi can be drawn anytime after 10 weeks of gestation  16 weeks: Optional genetic testing (quad screen) or single AFP       This testing helps understand your baby's risk for some genetic abnormalities.  20 weeks:  Screening ultrasound (fetal survey)       This testing will look for early growth abnormalities, and may tell the baby's sex if you wish to find out.  28 weeks: One hour sugar test (GCT), hemoglobin and platelets       This test helps identify diabetes of pregnancy or gestational diabetes.  We also look      at the iron in  your blood and how well your blood clots.  28 - 36 weeks: Tetanus shot (Tdap)       This shot helps protect you and your baby from tetanus and whooping cough.  36 weeks and later: Group B Strep test (GBS)       This test helps predict if you need antibiotics in labor to prevent infection in your baby.  Anytime September to April:  Flu shot       This shot helps protect you and your family from the flu.  This is especially important during pregnancy        Any time during or after your pregnancy you may experience increased depression and/or mood changes.    We are here to support you. Please contact us if you are:    Feeling anxious    Overwhelmed or sad     Trouble sleeping    Crying uncontrollably    Trouble caring for yourself or baby.    The typical schedule after your first visit today you can expect:     Visit 2 - 12-16 weeks  Visit 3 - 20 weeks  Visit 4 - 24 weeks  Visit 5 - 28 weeks  Visit 6 - 32 weeks  Visit 7 - 34 weeks  Visit 8 - 36 weeks  Weekly after 36 weeks until delivery.    If anything comes up between your visits or you have concerns please don't hesitate to contact us.    Secure access to your medical record:  Use [employer] (secure email communication and access to your chart) to send your primary care provider a message or make an appointment. Ask someone on your Team how to sign up for [employer]. To log on to [employer] or for more information in [employer] please visit the website at [email or web address].       Certified Nurse Midwife (CNM) Team  CYRUS Arita, SERGIO Ernst, CYRUS, CYRUS Merritt, SERGIO Sharpe, CYRUS, SERGIO Wootenjotereza, APRN, SERGIO Muhammad, APRN, CNM    Again, thank you for choosing the midwives at [facility].  We are excited to be a part of your pregnancy. Please let us know how we can best partner with you to improve your and your family's health.      Remedies for nausea and vomiting with pregnancy      Eat small frequent meals every 2-3  hours if possible.       Avoid food at extremes of temperature and drinks with carbonation.      Eat foods that appeal to you, avoiding fats and spicy foods.      Avoid liquids with foods.  Drink liquids 30-60 minutes before or after eating and sip slowly.      Bread, pasta, crackers, potatoes, and rice tend to be tolerated the best.      Don't worry about what you eat in the first 3 months, it is more important that you can eat and keep it down.       Try flat ginger ale or ginger tea      Before rising in the morning, eat a small amount of crackers or dry toast.      Peppermint tea      Ginger is a herbal remedy for nausea and you can use it in any form.  There are ginger tablets you can purchase.  The dose 1000 mg a day in divided doses.       You may also try doxylamine (Unisom) 12.5 mg three times a day which is a sleeping medication along with Vitamin B6 25 mg three times a day.  This combination takes up to a week to work so give it some time.       Benadryl (diphenhydramine) 25-50 mg every 8 hour or Dramamine (dimenhydrinate)  mg by mouth every 4-6 hours. Both of these medication may cause some drowsiness      Other things that may help include an Accupressure band and acupuncture      If these methods fail there are many prescriptions that we can try    If you begin to vomit more than 5 or 6 times a day and feel that you are unable to keep anything down, call the [facility] [phone]    Over-the-counter (OTC) medications during pregnancy    Make sure to follow package directions for dosing and information unless otherwise noted on the list.    Morning sickness/nausea:      Unisom (doxylamine) 12.5 mg (1/2 tab) and Vitamin B6 25-50 mg three times daily. Unisom may cause some drowsiness as it is typically used for sleep. The combination of these medications can be very effective but they can also be taken separately    Dramamine (Dimenhydrinate) 25-50 mg every 4-6 hours as needed    Benadryl  (diphenhydramine) 25-50 mg every 4-6 hours     Tiffanie tablets 1000 mg per day in divided doses    Constipation:      Colace (Docusate sodium)    Metamucil    Citrucel    Milk of magnesia    Fibercon    Miralax (if all other methods have failed)    Diarrhea:    Imodium (loperamide)    Heartburn:      Zantac (ranitidine)    Pepcid (famotidine)    Prilosec (omeprazole)    Antacids-Tums, Maalox (liquid or tablets), Rolaids  Pepto Bismol and Amarilis Davison are NOT RECOMMENDED for use during pregnancy because they contain aspirin    Hemorrhoids:      Tucks pads/ointment    Anusol/Anusol-HC    Preparation H    Gas Pain  Simethicone (Gas-X, Mylanta Gas, Mylicon)      Cough, cold, and congestion:      Robitussin (dextromethorphan) and Robitussin DM (dextromethorphan and guaifenesin)    Cough drops/zinc lozenges    Mucinex    Sudafed (after 16 weeks gestation)    Vicks Vapo rub  Cough medicine with alcohol like Nyquil is not recommended during pregnancy    Headache:      Acetaminophen (Tylenol) 650 mg every 4-6 hours or 1000 mg every 6 hours, do not exceed 4000 mg in 24 hours   Ibuprofen (Advil/Motrin) and Naproxen (Aleve) are not recommended during the 1st or 3rd trimester of pregnancy    Allergy:      Benadryl (diphenhydramine)    Zyrtec (cetirizine)    Claritin (loratadine)    Rash/Itching:      Benadryl lotion    Cortaid cream (Hydrocortisone cream)    Benadryl (diphenhydramine)    Vaginal yeast infection:      Monistat 3 or 7 day    Gyne-Lotrimin    Acne:      Benzoyl Peroxide    Salicylic acid     If you have questions or concerns about any medications that are available OTC or you are unsure if something is safe call:    Yun Evans  [phone]      Fish Safety During Pregnancy    Often time's women worry about eating fish during pregnancy. Fish can be totally safe to eat during pregnancy.However, some fish may contain contaminants that could harm you or your baby if you eat certain types of fish or eat fish too  often. Below is a list of which types of fish to eat, how often to eat fish, and which types of fish to avoid while pregnant.    Reasons to eat fish:    Fish is a great source of protein, vitamins, and minerals.    The oils found in fish are important to unborn and breast fed babies    Eating fish may play a role in the prevention of heart disease in adults       Fish to EAT while pregnant   2 servings per week of any of these types of fish    Catfish (farm raised)  Cod    Crab    Delgado    Flatfish   Oysters    Dagmar   [place] ([place]     Sardines   Scallops    Shrimp   Tilapia   1 serving per week of any of these fish    Canned LIGHT tuna     [place] caught-        [place]    Crappie   [place]    Yellow Perch   1 serving per MONTH of these types of fish    Canned WHITE tuna  [place]    Grouper   Halibut    Tide[place]    [place] Roughy    Tuna steak   [place] caught-    Bass    Catfish    [place] smaller than 20 inches    [place] smaller than 30 inches         Servings of fish should be based on your weight, 1 oz for every 20 lbs of body weight. For example: 130 lb person can safely eat 7 oz     150 lb person can safely eat 8 oz     170 lb person can safely eat 9 oz      Make sure to space out meals with fish throughout the month, don't eat all your fish meals for the month within a few days.       Fish to Avoid while Pregnant:      Shark   French Mackerel    Swordfish  Raw Sushi-any type of fish    Tile fish         [place] Caught:      [place] longer than 20 in    [place] longer than 30 in    Musky      Contaminants:      Mercury comes from air pollution and small amounts of mercury can damage the brain that is just starting to form or grow. Too much mercury may affect a child's behavior and lead to learning problems later in life. Too much mercury in adults and older children may cause tingling, numbness in hands and feet, or vision changes    PCBs a man-made substance once used in  electrical transformers but was banned in 1967 although it can still be found in the [place]. A baby who are exposed to PCBs during pregnancy may have a lower birth weight, reduced head size, and delayed physical development. Exposure to PCBs may also cause cancer    PFOS is a man-made chemical to make products that resist heat, oil, stains, and grease. Studies in lab animals exposed to low levels of PFOS showed decreas HDL (good cholesterol) and changes in thyroid hormone levels. The concern about PFOS is with long-term exposure such as consuming large amounts over a long period of time     Mercury and PFOS cannot be removed through cooking or cleaning. By removing fat when cleaning and cooking you can reduce PCBs.      For more information and up to date information about fish safety in [place] please contact the [place] Department of Health ([facility]) or the Department of Natural Resources (DNR):    [email or web address].  DNR: [phone]  [facility]: [phone]

## 2021-06-30 NOTE — PROGRESS NOTES
Olimpia Herrera is a 30 year old  [place],  who is not a previous CNM patient. She presents for a new OB Visit. This was a planned pregnancy, though somewhat a surprise as she and her  had been TTC for two years. FOB is Sharon who is in good health.  GUSTAVO IS actively involved in relationship and this pregnancy.    She has not had bleeding since her LMP.    She denies abdominal pain since her LMP.    She HAS had nausea with vomiting. She was evaluated in ED 21 and admitted for observation of N/V and discharged with Vitamin B6 and phenergan suppositories. She is taking these daily as directed as well as Zofran bid prn. This regimen is offering her adequate relief and enabling her to tolerate PO. She is eating small, frequent snacks/meals and is staying hydrated. She does have a history of HG with her previous pregnancies requiring IV infusions. Fortunately, symptoms have historically resolved by the second trimester.    Any personal or family history of blood clots? No  History of sickle cell anemia or trait? No         Patient's last menstrual period was 2021 (exact date)..  Estimated Date of Delivery: 2022 Ultrasound consistent with LMP.    MENSTRUAL HISTORY    frequency: every 25-36 days   Last PAP:  18, NIL  History of abnormal Pap?  No    Health maintenance updated:  yes        Current medications are:    Current Outpatient Medications:      acetaminophen (TYLENOL) 325 MG tablet, Take 325-650 mg by mouth every 6 hours as needed for mild pain, Disp: , Rfl:      famotidine (PEPCID) 20 MG tablet, Take 1 tablet (20 mg) by mouth 2 times daily, Disp: 90 tablet, Rfl: 1     ondansetron (ZOFRAN ODT) 4 MG ODT tab, Take 1 tablet (4 mg) by mouth every 6 hours as needed for nausea or vomiting, Disp: 10 tablet, Rfl: 0     Prenatal Vit-Fe Fumarate-FA (PRENATAL 19 PO), Take 1 tablet by mouth daily , Disp: , Rfl:      promethazine (PHENERGAN) 12.5 MG Suppository, Place 1 suppository  (12.5 mg) rectally every 6 hours as needed for nausea, Disp: 24 suppository, Rfl: 1     vitamin B6 (VITAMIN B6) 25 MG tablet, Take 1 tablet (25 mg) by mouth 3 times daily, Disp: 90 tablet, Rfl: 0     INFECTION HISTORY  HIV: No  Hepatitis B: No  Hepatitis C: No  Tuberculosis: No  Genital Herpes self: no  Herpes partner:  no  Chlamydia:  no  Gonorrhea:  no  HPV: No  BV:  No  Syphilis:  No  Chicken Pox:  No       OB HISTORY  OB History    Para Term  AB Living   4 2 2 0 1 2   SAB TAB Ectopic Multiple Live Births   1 0 0 0 2      # Outcome Date GA Lbr Christiano/2nd Weight Sex Delivery Anes PTL Lv   4 Current            3 Term 10/11/18 38w4d 08:05  00:33 3.79 kg (8 lb 5.7 oz) M Vag-Spont EPI N FERNANDO      Name: LUIS MANUEL PALM      Apgar1: 9  Apgar5: 9   2 Term 16 39w3d 05:01 / 00:32 3.155 kg (6 lb 15.3 oz) M Vag-Spont EPI N FERNANDO      Name: NERY WAGNER      Apgar1: 9  Apgar5: 9   1 SAB 08/13/15               History of GDM: No  PTL : No  History of HTN in pregnancy: No  Thrombocytopenia: No  Shoulder dystocia: No  Vacuum Extraction: No  PPH: No   3rd of 4th degree laceration: No.   Other complications: No    PERSONAL HISTORY  Exercise Habits:  Enjoys walking; limited activity lately due to N/V  Employment: [employer], working in medical assembly prior to Covid   Travel plans:  are none planned.   Diet: follows a balanced nutrition diet, has adequate calcium intake and takes daily vitamins  Abuse concerns? No  Hgb A1c screen:  BMI > 30: No, 1st degree family DM: No, History of GDM: No, PCOS: No, High risk ethnicity: Yes    Social History     Socioeconomic History     Marital status:      Spouse name: Sharon     Number of children: 2     Years of education: Not on file     Highest education level: Not on file   Occupational History     Occupation:    Social Needs     Financial resource strain: Not on file     Food insecurity     Worry: Not on file     Inability: Not on file      Transportation needs     Medical: Not on file     Non-medical: Not on file   Tobacco Use     Smoking status: Never Smoker     Smokeless tobacco: Never Used   Substance and Sexual Activity     Alcohol use: No     Alcohol/week: 0.0 standard drinks     Drug use: No     Sexual activity: Yes     Partners: Male   Lifestyle     Physical activity     Days per week: Not on file     Minutes per session: Not on file     Stress: Not on file   Relationships     Social connections     Talks on phone: Not on file     Gets together: Not on file     Attends Taoism service: Not on file     Active member of club or organization: Not on file     Attends meetings of clubs or organizations: Not on file     Relationship status: Not on file     Intimate partner violence     Fear of current or ex partner: Not on file     Emotionally abused: Not on file     Physically abused: Not on file     Forced sexual activity: Not on file   Other Topics Concern     Not on file   Social History Narrative     Not on file       She  reports that she has never smoked. She has never used smokeless tobacco.    STD testing offered?  Accepted  Last PHQ-9 score on record =   PHQ-9 SCORE 11/21/2018   PHQ-9 Total Score 3     Last GAD7 score on record = No flowsheet data found.   EPDS=10    Referral/Meds needed? no    Reports mood changes/mild struggle with emotions due to early pregnancy symptoms. States similar experience with her other pregnancies and is not concerned about her mental well-being at this time. She denies any previous postpartum mood disorders. She is coping okay and reports good support from  and her in-laws who live in the area and help out with her other two kids on a regular basis.      PAST MEDICAL/SURGICAL HISTORY  Past Medical History:   Diagnosis Date     Anemia      Constipation      Eczema, dyshidrotic      Migraine      Migraines      Varicella      Past Surgical History:   Procedure Laterality Date     APPENDECTOMY          FAMILY HISTORY  Family History   Problem Relation Age of Onset     Hypertension Mother      No Known Problems Sister      Hypertension Maternal Grandmother      No Known Problems Sister          ROS:  12 point review of systems negative other than symptoms noted below or in the HPI.      PHYSICAL EXAM  Vitals: LMP 2021 (Exact Date)   BMI= Body mass index is 27.67 kg/m .     GENERAL:  30 year old pleasant pregnant female, alert, cooperative and well groomed.  NECK:  Thyroid without enlargement and nodules.  Lymph nodes not palpable.   LUNGS:  Clear to auscultation bilaterally.  BREAST:  No palpable axillary nodes.  HEART:  RRR without murmur.  ABDOMEN: Soft without masses or tenderness. No diastasis. FHTs not assessed today due to early gestational age.  GENITALIA: BUS without tenderness or inflammation.  Perineum without lesions. Vaginal introitus pink with normal discharge. GC/CT collected. Bimanual exam deferred.  LOWER EXTREMITIES: No edema, reflexes normal bilaterally. No significant varicosities.    ASSESSMENT/PLAN:    IUP at 9w2d    ICD-10-CM    1. Encounter for supervision of other normal pregnancy, first trimester  Z34.81 Hepatitis C antibody     Urine Culture Aerobic Bacterial     Treponema Abs w Reflex to RPR and Titer     Rubella Antibody IgG Quantitative     HIV Antigen Antibody Combo     CBC with platelets     Hepatitis B surface antigen     ABO/Rh type and screen     Hemoglobin A1c     NEISSERIA GONORRHOEA PCR     CHLAMYDIA TRACHOMATIS PCR     Varicella Zoster Virus Antibody IgG   2. Routine screening for STI (sexually transmitted infection)  Z11.3 NEISSERIA GONORRHOEA PCR     CHLAMYDIA TRACHOMATIS PCR        consult for US for AMA patients: No  Genetic Testing reviewed and discussed, patient will likely accept at next visit. Handout provided.    COUNSELING    Reviewed CNM philosophy, call schedule for labor and delivery, and FSH for delivery    1st OB handout given outlining  appointment spacing and CNM information    Instructed on use of triage nurse line and contacting the on call CNM after hours in an emergency.     Symptoms of N&V and fatigue usually start to resolve around 12-16 weeks     Reviewed exercise and nutrition    Recommend to gain 15-25 pounds with her pregnancy.    Discussed OTC medications. OB med list given.    Encouraged patient to take PNV's/DHA    Travel precautions discussed, no air travel after 36 weeks and Covid precautions discussed    Will notify patient with lab results when available      F/U to be addressed next visit:  NIPT/genetic testing and consent if desired    Will return to the clinic in 4 weeks for her next routine prenatal check.  Will call to be seen sooner if problems arise.      Yamilka Lizarraga, DNP, APRN, CNM

## 2021-07-01 LAB
BACTERIA SPEC CULT: NORMAL
C TRACH DNA SPEC QL NAA+PROBE: NEGATIVE
HBV SURFACE AG SERPL QL IA: NONREACTIVE
HCV AB SERPL QL IA: NONREACTIVE
HIV 1+2 AB+HIV1 P24 AG SERPL QL IA: NONREACTIVE
Lab: NORMAL
N GONORRHOEA DNA SPEC QL NAA+PROBE: NEGATIVE
RUBV IGG SERPL IA-ACNC: 45 IU/ML
SPECIMEN SOURCE: NORMAL
T PALLIDUM AB SER QL: NONREACTIVE
VZV IGG SER QL IA: 0.2 AI (ref 0–0.8)

## 2021-07-03 PROBLEM — Z28.39 MATERNAL VARICELLA, NON-IMMUNE: Status: ACTIVE | Noted: 2021-07-03

## 2021-07-03 PROBLEM — O09.899 MATERNAL VARICELLA, NON-IMMUNE: Status: ACTIVE | Noted: 2021-07-03

## 2021-07-27 ENCOUNTER — TELEPHONE (OUTPATIENT)
Dept: OBGYN | Facility: CLINIC | Age: 31
End: 2021-07-27

## 2021-07-27 NOTE — TELEPHONE ENCOUNTER
Form received from: [facility]     Form requesting following info/need: WIC    UMBERTO needed?: No    Location of form: Celina's desk    When completed the route for return: Fax [phone]

## 2021-08-05 ENCOUNTER — PRENATAL OFFICE VISIT (OUTPATIENT)
Dept: OBGYN | Facility: CLINIC | Age: 31
End: 2021-08-05
Payer: COMMERCIAL

## 2021-08-05 ENCOUNTER — TELEPHONE (OUTPATIENT)
Dept: OBGYN | Facility: CLINIC | Age: 31
End: 2021-08-05

## 2021-08-05 VITALS
SYSTOLIC BLOOD PRESSURE: 104 MMHG | HEIGHT: 62 IN | BODY MASS INDEX: 27.42 KG/M2 | DIASTOLIC BLOOD PRESSURE: 60 MMHG | WEIGHT: 149 LBS

## 2021-08-05 DIAGNOSIS — Z01.818 PREPROCEDURAL EXAMINATION: ICD-10-CM

## 2021-08-05 DIAGNOSIS — Z33.1 PREGNANCY, INCIDENTAL: Primary | ICD-10-CM

## 2021-08-05 DIAGNOSIS — K21.9 GASTROESOPHAGEAL REFLUX DISEASE WITHOUT ESOPHAGITIS: Primary | ICD-10-CM

## 2021-08-05 PROCEDURE — 36415 COLL VENOUS BLD VENIPUNCTURE: CPT | Performed by: OBSTETRICS & GYNECOLOGY

## 2021-08-05 PROCEDURE — 99207 PR PRENATAL VISIT: CPT | Performed by: OBSTETRICS & GYNECOLOGY

## 2021-08-05 ASSESSMENT — MIFFLIN-ST. JEOR: SCORE: 1334.11

## 2021-08-05 NOTE — NURSING NOTE
"Chief Complaint   Patient presents with     Prenatal Care     14 4/7 weeks       Initial /60 (BP Location: Right arm, Patient Position: Chair, Cuff Size: Adult Large)   Ht 1.575 m (5' 2\")   Wt 67.6 kg (149 lb)   LMP 2021 (Exact Date)   Breastfeeding No   BMI 27.25 kg/m   Estimated body mass index is 27.25 kg/m  as calculated from the following:    Height as of this encounter: 1.575 m (5' 2\").    Weight as of this encounter: 67.6 kg (149 lb).  BP completed using cuff size: regular    Questioned patient about current smoking habits.  Pt. has never smoked.          The following HM Due: NONE    +++nausea and vomiting    Shantelle Camacho CMA    "

## 2021-08-05 NOTE — TELEPHONE ENCOUNTER
----- Message from Amna Antunez MD sent at 8/5/2021  3:18 PM CDT -----  Hi ladies!    This patient had an old IV infusion episode from a prior pregnancy but is having a lot of problems with spitting/nausea in this pregnancy and thinks more IV infusionswould help.  Are you able to help set this up for her again?    Thanks!  Amna Antunez MD, MPH  [facility]

## 2021-08-05 NOTE — PROGRESS NOTES
31 year old  at 14w4d     - Still nauseated, spitting a lot, carries around a bag with her.  Had IV infusions in her last pregnancy, would like to do the same thing again this time.  Is using zofran, phenergan, B6.  Will also recommend unisom qid.   Usually she feels better after about 5 months.   - anatomy US ordered, also desires NIPT today  - interested in covid vaccine, will refer to IM clinic for vaccines  - reviewed quickening    RTC 4 wks    Amna Antunez MD, MPH  [facility]

## 2021-08-05 NOTE — TELEPHONE ENCOUNTER
I can place a new therapy plan for this pregnancy but need to know specific orders, we cannot use any old encounters for this.      How often do you want her to have infusions?   How many treatments do you want?  What antiemetics do you want ordered?  Zofran is the one that defaults on the therapy plan.  What IVF do you want?  D5LR or LR?      Azucena Alves RN

## 2021-08-11 RX ORDER — HEPARIN SODIUM (PORCINE) LOCK FLUSH IV SOLN 100 UNIT/ML 100 UNIT/ML
5 SOLUTION INTRAVENOUS
Status: CANCELLED | OUTPATIENT
Start: 2021-09-30

## 2021-08-11 RX ORDER — ONDANSETRON 2 MG/ML
4 INJECTION INTRAMUSCULAR; INTRAVENOUS ONCE
Status: CANCELLED | OUTPATIENT
Start: 2021-09-30 | End: 2021-09-30

## 2021-08-11 RX ORDER — DEXTROSE, SODIUM CHLORIDE, SODIUM LACTATE, POTASSIUM CHLORIDE, AND CALCIUM CHLORIDE 5; .6; .31; .03; .02 G/100ML; G/100ML; G/100ML; G/100ML; G/100ML
1000 INJECTION, SOLUTION INTRAVENOUS ONCE
Status: CANCELLED | OUTPATIENT
Start: 2021-09-30 | End: 2021-09-30

## 2021-08-11 RX ORDER — HEPARIN SODIUM,PORCINE 10 UNIT/ML
5 VIAL (ML) INTRAVENOUS
Status: CANCELLED | OUTPATIENT
Start: 2021-09-30

## 2021-08-11 NOTE — TELEPHONE ENCOUNTER
Twice a week prn    Allow for 5 weeks of treatment    Give D5LR 1L with 4mg IV zofran    THank you!

## 2021-08-13 ENCOUNTER — OFFICE VISIT (OUTPATIENT)
Dept: URGENT CARE | Facility: URGENT CARE | Age: 31
End: 2021-08-13
Payer: COMMERCIAL

## 2021-08-13 VITALS
OXYGEN SATURATION: 100 % | TEMPERATURE: 98.5 F | DIASTOLIC BLOOD PRESSURE: 62 MMHG | SYSTOLIC BLOOD PRESSURE: 102 MMHG | BODY MASS INDEX: 26.52 KG/M2 | HEART RATE: 68 BPM | WEIGHT: 145 LBS

## 2021-08-13 DIAGNOSIS — S69.91XA THUMB INJURY, RIGHT, INITIAL ENCOUNTER: Primary | ICD-10-CM

## 2021-08-13 PROCEDURE — 99213 OFFICE O/P EST LOW 20 MIN: CPT | Performed by: FAMILY MEDICINE

## 2021-08-13 RX ORDER — AMOXICILLIN AND CLAVULANATE POTASSIUM 400; 57 MG/5ML; MG/5ML
800 POWDER, FOR SUSPENSION ORAL 2 TIMES DAILY
Qty: 140 ML | Refills: 0 | Status: SHIPPED | OUTPATIENT
Start: 2021-08-13 | End: 2021-08-20

## 2021-08-13 NOTE — PATIENT INSTRUCTIONS
Okay for tylenol for pain  Ice to area and elevate thumb    Due to injury, you may have a fracture but due to pregnancy, we are not able to confirm this with an Xray    Please take the antibiotic to prevent wound infection due to the type of injury that you have    Allow the blood to ooze out  Keep the dressing on for today  Tomorrow is okay to take the dressing off and use the finger splint to protect your thumb      Patient Education     Detached Fingernail or Toenail  A detached nail is when the nail becomes  from the area underneath it (the nail bed). This often means losing all or part of the nail. An injury to your finger or toe is often the cause. It can also be caused by an infection or other skin diseases around or under the nail.   Sometimes there is a cut in the nail bed or a bone fracture under the nail. In most cases, the nail will grow back from the area under the cuticle (the matrix). A fingernail takes about 4 to 6 months to grow back. A toenail takes about 12 months to grow back. If the nail bed or matrix was damaged, the nail may grow back with a rough or abnormal shape. In some cases the nail may not grow back at all.     There may be damage or a cut to the nail bed. This may need to be repaired. Often this is done with stitches. If the nail is still in good condition, it might be cleaned and trimmed, then put back in place. This is done to help and protect the new nail as it grows back. It also prevents the nail bed from drying out.   Home care    Keep the injured part raised to reduce pain and swelling. This is important, especially in the first 48 hours.    Apply an ice pack for up to 20 minutes. Do this every 3 to 6 hours during the first 24 to 48 hours. Keep using ice packs to ease pain and swelling as needed. To make an ice pack, put ice cubes in a plastic bag that seals at the top. Wrap the bag in a clean, thin towel or cloth. Never put ice or an ice pack directly on the skin. The  ice pack can be put right on a wrap, cast, or splint. As the ice melts, be careful not to get any wrap, cast, or splint wet.    The nail bed is moist, soft, and sensitive. It needs to be protected from injury for the first 7 to 10 days until it dries out and becomes hard. Keep it covered with a nonstick dressing or a bandage without adhesive.    When a dressing is placed on an exposed nail bed, it may stick and be hard to remove if left in place more than 24 hours. Unless you were told otherwise, change dressings every 24 hours. If needed, soak the dressing off while holding it under warm running water. Then lightly pat the wound dry. Apply a layer of antibiotic ointment before putting on the new dressing or bandage. This will help keep it from sticking. Use a nonstick dressing with the antibiotic ointment under the outer dressing.    If an X-ray showed that you have a fracture, it will take about 4 weeks for this to heal. The injured part should be protected with a splint or tape while it is healing.    If you were given antibiotics to prevent infection, take them as directed until they are all gone.  Medicine    You can take over-the-counter medicine for pain, unless you were given a different pain medicine to use. Talk with your provider before using these medicines if you have chronic liver or kidney disease. Also talk with your provider if you ever had a stomach ulcer or digestive bleeding, or are taking blood-thinner medicines.    If you were given antibiotics, take them until they are done. It's important to finish the antibiotics even if the wound looks better. This is to make sure the infection has cleared.    Follow-up care  Follow up with your healthcare provider, or as advised. If X-rays were taken, you will be told of any new findings that may affect your care.   When to seek medical advice  Call your healthcare provider right away if any of these occur:    Pain or swelling increases    Redness around  the nail    Pus (creamy white or yellow fluid) draining from the nail    Fever of 100.4 F (38 C) or higher, or as directed by your provider  Claire last reviewed this educational content on 7/1/2019 2000-2021 [employer]. All rights reserved. This information is not intended as a substitute for professional medical care. Always follow your healthcare professional's instructions.

## 2021-08-13 NOTE — TELEPHONE ENCOUNTER
Completed forms faxed to number provided. Copy placed in BK's bin at the front nurse station in , original sent to be scanned into patient's chart.

## 2021-08-13 NOTE — PROGRESS NOTES
SUBJECTIVE:  Chief Complaint   Patient presents with     Urgent Care     Right thumb laceration- 15 weeks pregnant     Olimpia Herrera is a 31 year old female presents with a chief complaint of right thumb injury.    Sustained injury this morning, accidentally got thumb caught in car door.  Has bleeding and pain.  Currently pregnant at 15 weeks gestation.  Has not tried anything yet     Past Medical History:   Diagnosis Date     Anemia      Constipation      Eczema, dyshidrotic      Migraine      Migraines      Varicella      Current Outpatient Medications   Medication Sig Dispense Refill     amoxicillin-clavulanate (AUGMENTIN) 400-57 MG/5ML suspension Take 10 mLs (800 mg) by mouth 2 times daily for 7 days 140 mL 0     Prenatal Vit-Fe Fumarate-FA (PRENATAL 19 PO) Take 1 tablet by mouth daily        acetaminophen (TYLENOL) 325 MG tablet Take 325-650 mg by mouth every 6 hours as needed for mild pain (Patient not taking: Reported on 8/5/2021)       famotidine (PEPCID) 20 MG tablet Take 1 tablet (20 mg) by mouth 2 times daily (Patient not taking: Reported on 8/13/2021) 90 tablet 1     ondansetron (ZOFRAN ODT) 4 MG ODT tab Take 1 tablet (4 mg) by mouth every 6 hours as needed for nausea or vomiting (Patient not taking: Reported on 8/13/2021) 10 tablet 0     promethazine (PHENERGAN) 12.5 MG Suppository Place 1 suppository (12.5 mg) rectally every 6 hours as needed for nausea (Patient not taking: Reported on 8/13/2021) 24 suppository 1     vitamin B6 (VITAMIN B6) 25 MG tablet Take 1 tablet (25 mg) by mouth 3 times daily (Patient not taking: Reported on 8/13/2021) 90 tablet 0     Social History     Tobacco Use     Smoking status: Never Smoker     Smokeless tobacco: Never Used   Substance Use Topics     Alcohol use: No     Alcohol/week: 0.0 standard drinks       ROS:  Review of systems negative except as stated above.    EXAM:   /62   Pulse 68   Temp 98.5  F (36.9  C) (Oral)   Wt 65.8 kg (145 lb)   LMP 04/25/2021  (Exact Date)   SpO2 100%   BMI 26.52 kg/m    Gen: alert, mild distress   Extremity: right thumb - nail with crack middle of nail, still adherent to nailbed, mild bleeding/oozing thru.  Tenderness diffusely, no bruising on pad of thumb, decrease ROM due to tenderness  There is not compromise to the distal circulation.  Pulses are +2 and CRT is brisk  PSYCH: alert, affect anxious    X-RAY was not done.    ASSESSMENT/PLAN:   (S69.91XA) Thumb injury, right, initial encounter  (primary encounter diagnosis)  Comment: nail injury  Plan: amoxicillin-clavulanate (AUGMENTIN) 400-57         MG/5ML suspension            Due to pregnancy, will not be able to obtain Xray to verify if sustained a fracture.  Due to crack/laceration of nail due to injury, concerned for possible open wound injury, RX Augmentin given for treatment to prevent wound infection.  Discussed that due to crack in nail that bleeding has a way to ooze out so wound not develop a subungual hematoma.  Discussed that nail may lift off and fall off.      Thumb laceration/wound soaked in shur-clens and cleaned off.  Antibiotic ointment and dressing to thumb.  Finger splint to wear afterwards.    Okay for tylenol for discomfort, elevate, ice to injury    Follow up with primary provider if no improvement of symptoms within 1 week    Edgardo Bradford MD,  August 13, 2021 6:58 PM

## 2021-08-16 LAB — SCANNED LAB RESULT: NORMAL

## 2021-09-02 ENCOUNTER — PRENATAL OFFICE VISIT (OUTPATIENT)
Dept: OBGYN | Facility: CLINIC | Age: 31
End: 2021-09-02
Payer: COMMERCIAL

## 2021-09-02 VITALS
BODY MASS INDEX: 28.71 KG/M2 | DIASTOLIC BLOOD PRESSURE: 70 MMHG | WEIGHT: 156 LBS | HEIGHT: 62 IN | SYSTOLIC BLOOD PRESSURE: 116 MMHG

## 2021-09-02 DIAGNOSIS — Z3A.20 20 WEEKS GESTATION OF PREGNANCY: Primary | ICD-10-CM

## 2021-09-02 PROCEDURE — 99207 PR PRENATAL VISIT: CPT | Performed by: OBSTETRICS & GYNECOLOGY

## 2021-09-02 ASSESSMENT — MIFFLIN-ST. JEOR: SCORE: 1365.86

## 2021-09-02 NOTE — NURSING NOTE
"Chief Complaint   Patient presents with     Prenatal Care     18 4/7 weeks       Initial /70 (BP Location: Left arm, Patient Position: Chair, Cuff Size: Adult Regular)   Ht 1.575 m (5' 2\")   Wt 70.8 kg (156 lb)   LMP 2021 (Exact Date)   Breastfeeding No   BMI 28.53 kg/m   Estimated body mass index is 28.53 kg/m  as calculated from the following:    Height as of this encounter: 1.575 m (5' 2\").    Weight as of this encounter: 70.8 kg (156 lb).  BP completed using cuff size: regular    Questioned patient about current smoking habits.  Pt. has never smoked.          The following HM Due: NONE  Shantelle Camacho CMA    "

## 2021-09-02 NOTE — PROGRESS NOTES
31 year old  at 18w4d     A+/RI, NIPT nl XX.  Anatomy US ordered.  Nausea resolved.   Reviewed safety of covid vaccine, recommend she get the vaccine, she will continue to think about it    RTC 4 wks     Amna Antunez MD, MPH  [facility]

## 2021-09-22 ENCOUNTER — ANCILLARY PROCEDURE (OUTPATIENT)
Dept: ULTRASOUND IMAGING | Facility: CLINIC | Age: 31
End: 2021-09-22
Attending: OBSTETRICS & GYNECOLOGY
Payer: COMMERCIAL

## 2021-09-22 DIAGNOSIS — Z3A.20 20 WEEKS GESTATION OF PREGNANCY: ICD-10-CM

## 2021-09-22 PROCEDURE — 76805 OB US >/= 14 WKS SNGL FETUS: CPT | Performed by: OBSTETRICS & GYNECOLOGY

## 2021-09-29 ENCOUNTER — HOSPITAL ENCOUNTER (OUTPATIENT)
Facility: CLINIC | Age: 31
Discharge: HOME | End: 2021-09-29
Attending: OBSTETRICS & GYNECOLOGY | Admitting: OBSTETRICS & GYNECOLOGY
Payer: COMMERCIAL

## 2021-09-29 ENCOUNTER — HOSPITAL ENCOUNTER (EMERGENCY)
Facility: CLINIC | Age: 31
End: 2021-09-29
Payer: COMMERCIAL

## 2021-09-29 VITALS — TEMPERATURE: 99 F

## 2021-09-29 PROBLEM — Z36.89 ENCOUNTER FOR TRIAGE IN PREGNANT PATIENT: Status: ACTIVE | Noted: 2021-09-29

## 2021-09-29 PROCEDURE — G0463 HOSPITAL OUTPT CLINIC VISIT: HCPCS

## 2021-09-29 RX ORDER — LIDOCAINE 40 MG/G
CREAM TOPICAL
Status: DISCONTINUED | OUTPATIENT
Start: 2021-09-29 | End: 2021-09-29 | Stop reason: HOSPADM

## 2021-09-29 NOTE — PLAN OF CARE
Data: [facility] presented to [facility]: 2021  1:03 PM.  Reason for maternal/fetal assessment is abdominal pain. Patient reports that she has been cramping on and off since yesterday. Today she has noticed them getting more painful and has noticed about 5 cramps today that radiate towards her back. She states that they get better when she lays down, she has not taken any tylenol for them. Pt states she has had normal bowel movements, most recently this morning. She is drinking as normal and last had food last night. Baby is very active. Patient is a .  Prenatal record reviewed. Pregnancy has been uncomplicated..  Gestational Age 22w3d. VSS. Fetal movement active. Patient denies leaking of vaginal fluid/rupture of membranes, vaginal bleeding, nausea, vomiting, headache, visual disturbances, epigastric or URQ pain, significant edema. Support person is not present.   Action: Verbal consent for EFM. Triage assessment completed. Bill of rights reviewed.  Response: Patient verbalized agreement with plan. Will contact Dr Shawn Rice with update and for further orders.

## 2021-09-29 NOTE — PROVIDER NOTIFICATION
09/29/21 1332   Provider Notification   Provider Name/Title Dr Rice   Method of Notification In Department   Request Evaluate - Remote     Dr Shawn Rice informed of patient arrival and assessment including the following:    Reason for maternal/fetal assessment abdominal cramping. Patient reports that she has been cramping on and off since yesterday. Today she has noticed them getting more painful and has noticed about 5 cramps today that radiate towards her back. She states that they get better when she lays down, she has not taken any tylenol for them. Pt states she has had normal bowel movements, most recently this morning. She is drinking as normal and last had food last night. Baby is very active. . Fetal status appropriate for gestational age (gestation under 28 weeks). Plan per provider/orders received for SVE, FHR monitoring for 30 minutes and if SVE is closed and contractions are not noted in that time ok to discharge home and follow up as scheduled.

## 2021-09-29 NOTE — DISCHARGE INSTRUCTIONS
Discharge Instruction for Undelivered Patients      You were seen for: abdominal cramping  We Consulted: Dr Rice  You had (Test or Medicine): fetal heart rate monitoring, cervical exam     Diet:   Drink 8 to 12 glasses of liquids (milk, juice, water) every day.     Activity:  Call your doctor or nurse midwife if your baby is moving less than usual.     Call your provider if you notice:  Swelling in your face or increased swelling in your hands or legs.  Headaches that are not relieved by Tylenol (acetaminophen).  Changes in your vision (blurring: seeing spots or stars.)  Nausea (sick to your stomach) and vomiting (throwing up).   Weight gain of 5 pounds or more per week.  Heartburn that doesn't go away.  Signs of bladder infection: pain when you urinate (use the toilet), need to go more often and more urgently.  The bag of cason (rupture of membranes) breaks, or you notice leaking in your underwear.  Bright red blood in your underwear.  Abdominal (lower belly) or stomach pain.  For first baby: Contractions (tightening) less than 5 minutes apart for one hour or more.  Second (plus) baby: Contractions (tightening) less than 10 minutes apart and getting stronger.  *If less than 34 weeks: Contractions (tightening) more than 6 times in one hour.  Increase or change in vaginal discharge (note the color and amount)      Follow-up:  As scheduled in the clinic on Friday

## 2021-09-29 NOTE — PLAN OF CARE
Data: Patient assessed in the [facility] for abdominal cramping.  Cervical exam posterior, with an SVE that was closed (external os open to 1cm but RN unable to get through cervix) .  Membranes intact.  Contractions/uterine assessment not noted on toco or palpated by RN . Pt does not note any abdominal cramping while at the hospital.  Action:  Presumed adequate fetal oxygenation documented (see flow record). Discharge instructions reviewed.  Patient instructed to report change in fetal movement, vaginal leaking of fluid or bleeding, abdominal pain, or any concerns related to the pregnancy to her nurse/physician.    Response: Orders to discharge home per Shawn Rice.  Patient verbalized understanding of education and verbalized agreement with plan. Discharged to home at 1420.

## 2021-10-01 ENCOUNTER — PRENATAL OFFICE VISIT (OUTPATIENT)
Dept: OBGYN | Facility: CLINIC | Age: 31
End: 2021-10-01
Payer: COMMERCIAL

## 2021-10-01 VITALS
BODY MASS INDEX: 29.81 KG/M2 | SYSTOLIC BLOOD PRESSURE: 120 MMHG | HEIGHT: 62 IN | WEIGHT: 162 LBS | DIASTOLIC BLOOD PRESSURE: 70 MMHG

## 2021-10-01 DIAGNOSIS — Z23 NEED FOR PROPHYLACTIC VACCINATION AND INOCULATION AGAINST INFLUENZA: ICD-10-CM

## 2021-10-01 DIAGNOSIS — Z34.80 SUPERVISION OF OTHER NORMAL PREGNANCY, ANTEPARTUM: Primary | ICD-10-CM

## 2021-10-01 PROCEDURE — 90686 IIV4 VACC NO PRSV 0.5 ML IM: CPT | Performed by: OBSTETRICS & GYNECOLOGY

## 2021-10-01 PROCEDURE — 99207 PR PRENATAL VISIT: CPT | Performed by: OBSTETRICS & GYNECOLOGY

## 2021-10-01 PROCEDURE — 90471 IMMUNIZATION ADMIN: CPT | Performed by: OBSTETRICS & GYNECOLOGY

## 2021-10-01 ASSESSMENT — MIFFLIN-ST. JEOR: SCORE: 1393.08

## 2021-10-01 NOTE — NURSING NOTE
"Chief Complaint   Patient presents with     Prenatal Care     22 5/7 weeks       Initial /70 (BP Location: Right arm, Patient Position: Chair, Cuff Size: Adult Regular)   Ht 1.575 m (5' 2\")   Wt 73.5 kg (162 lb)   LMP 2021 (Exact Date)   Breastfeeding No   BMI 29.63 kg/m   Estimated body mass index is 29.63 kg/m  as calculated from the following:    Height as of this encounter: 1.575 m (5' 2\").    Weight as of this encounter: 73.5 kg (162 lb).  BP completed using cuff size: regular    Questioned patient about current smoking habits.  Pt. has never smoked.          The following HM Due: NONE  +fetal movement  -swelling    Shantelle Camacho, CMA    "

## 2021-10-01 NOTE — PROGRESS NOTES
31 year old  at 22w5d     A+/RI, NIPT nl XX.  Anatomy US normal.  Flu shot today.    Reviewed MSK pains in pregnancy, palliative measures.    RTC 4 wks for GCT     Amna Antunez MD, MPH  [facility]

## 2021-10-25 ENCOUNTER — PRENATAL OFFICE VISIT (OUTPATIENT)
Dept: OBGYN | Facility: CLINIC | Age: 31
End: 2021-10-25
Payer: COMMERCIAL

## 2021-10-25 VITALS
BODY MASS INDEX: 30.55 KG/M2 | SYSTOLIC BLOOD PRESSURE: 116 MMHG | WEIGHT: 166 LBS | DIASTOLIC BLOOD PRESSURE: 60 MMHG | HEIGHT: 62 IN

## 2021-10-25 DIAGNOSIS — Z3A.26 26 WEEKS GESTATION OF PREGNANCY: Primary | ICD-10-CM

## 2021-10-25 LAB
ERYTHROCYTE [DISTWIDTH] IN BLOOD BY AUTOMATED COUNT: 13.6 % (ref 10–15)
HCT VFR BLD AUTO: 30.6 % (ref 35–47)
HGB BLD-MCNC: 9.9 G/DL (ref 11.7–15.7)
MCH RBC QN AUTO: 30.5 PG (ref 26.5–33)
MCHC RBC AUTO-ENTMCNC: 32.4 G/DL (ref 31.5–36.5)
MCV RBC AUTO: 94 FL (ref 78–100)
PLATELET # BLD AUTO: 164 10E3/UL (ref 150–450)
RBC # BLD AUTO: 3.25 10E6/UL (ref 3.8–5.2)
WBC # BLD AUTO: 4.7 10E3/UL (ref 4–11)

## 2021-10-25 PROCEDURE — 86780 TREPONEMA PALLIDUM: CPT | Performed by: OBSTETRICS & GYNECOLOGY

## 2021-10-25 PROCEDURE — 99207 PR PRENATAL VISIT: CPT | Performed by: OBSTETRICS & GYNECOLOGY

## 2021-10-25 PROCEDURE — 85027 COMPLETE CBC AUTOMATED: CPT | Performed by: OBSTETRICS & GYNECOLOGY

## 2021-10-25 PROCEDURE — 90715 TDAP VACCINE 7 YRS/> IM: CPT | Performed by: OBSTETRICS & GYNECOLOGY

## 2021-10-25 PROCEDURE — 82952 GTT-ADDED SAMPLES: CPT | Performed by: OBSTETRICS & GYNECOLOGY

## 2021-10-25 PROCEDURE — 90471 IMMUNIZATION ADMIN: CPT | Performed by: OBSTETRICS & GYNECOLOGY

## 2021-10-25 PROCEDURE — 36415 COLL VENOUS BLD VENIPUNCTURE: CPT | Performed by: OBSTETRICS & GYNECOLOGY

## 2021-10-25 ASSESSMENT — MIFFLIN-ST. JEOR: SCORE: 1411.22

## 2021-10-25 NOTE — PROGRESS NOTES
31 year old  at 26w1d     A+/RI, NIPT nl XX.  s/p TDaP and flu.  GCT today  Discussed covid vaccine again; she will consider it in pregnancy    RTC 4 wks     Amna Antunez MD, MPH  [facility]

## 2021-10-26 LAB
GLUCOSE 1H P 50 G GLC PO SERPL-MCNC: 85 MG/DL (ref 70–129)
T PALLIDUM AB SER QL: NONREACTIVE

## 2021-10-28 ENCOUNTER — TELEPHONE (OUTPATIENT)
Dept: OBGYN | Facility: CLINIC | Age: 31
End: 2021-10-28
Payer: COMMERCIAL

## 2021-10-28 ENCOUNTER — TELEPHONE (OUTPATIENT)
Dept: OBGYN | Facility: CLINIC | Age: 31
End: 2021-10-28

## 2021-10-28 DIAGNOSIS — U07.1 INFECTION DUE TO 2019 NOVEL CORONAVIRUS: Primary | ICD-10-CM

## 2021-10-28 NOTE — TELEPHONE ENCOUNTER
calls to advise that patient tested positive for COVID-19 yesterday.  Patient is pregnant.  Transferred spouse to OB/Gyn for further instructions.

## 2021-10-28 NOTE — TELEPHONE ENCOUNTER
Called pt, she started coughing on Wednesday  Test was Wednesday.    I was able to complete the request form on the [facility].  They will review and advise if she can receive the treatment.  I included our clinic phone number and Dr Antunez's email as a point of contact.  I did advise them to contact the pt to schedule if she is eligible.  The pt is aware that she may be contacted regarding this treatment as an option.    Azucena Alves RN

## 2021-10-28 NOTE — TELEPHONE ENCOUNTER
This patient should be evaluated for receipt of monoclonal antibodies since it is a treatment recommended for pregnant patients who are early on in a covid diagnosis to prevent it from getting severe.  Unfortunately I do not know how we facilitate this in the [employer] system right now, would you be able to look into it with our administrators?  Thank you so much.    Supposedly we are able to enter a covid monoclonal antibody referral but the order is not working for me.      [email or web address]

## 2021-10-28 NOTE — TELEPHONE ENCOUNTER
Pt's  calling to let us know pt has COVID, was positive yesterday.  Sx of congestion, cough, low grade fever.    Will send medications ok to treat these sx via mychart.    Advised to quarantine as directed, rest, hydrate, seek care in ER for shortness of breath.    Forwarding to providers as FYI and also infection prevention.  Next OB appt not until 11/19.    Azucena Alves RN

## 2021-10-28 NOTE — TELEPHONE ENCOUNTER
To facilitate the monoclonal antibodies there is a form to enter on the [facility] website.  I believe the pt would receive them either as an outpatient or in their home from home infusion.    Called pt and LM for her to Cb.  Wanting to let her know we are looking into this as a treatment option.    I am in the middle of entering the information but do need to ask further questions.  Please send pt to Azucena if she calls back.    Azucena Alves RN

## 2021-10-29 DIAGNOSIS — E61.1 LOW IRON: Primary | ICD-10-CM

## 2021-10-29 RX ORDER — FERROUS SULFATE 325(65) MG
325 TABLET ORAL
Qty: 100 TABLET | Refills: 1 | Status: ON HOLD | OUTPATIENT
Start: 2021-10-29 | End: 2021-11-01

## 2021-10-31 ENCOUNTER — APPOINTMENT (OUTPATIENT)
Dept: GENERAL RADIOLOGY | Facility: CLINIC | Age: 31
End: 2021-10-31
Attending: EMERGENCY MEDICINE
Payer: COMMERCIAL

## 2021-10-31 ENCOUNTER — HOSPITAL ENCOUNTER (EMERGENCY)
Facility: CLINIC | Age: 31
Discharge: HOME | End: 2021-10-31
Attending: EMERGENCY MEDICINE | Admitting: EMERGENCY MEDICINE
Payer: COMMERCIAL

## 2021-10-31 VITALS
HEART RATE: 90 BPM | OXYGEN SATURATION: 100 % | RESPIRATION RATE: 29 BRPM | DIASTOLIC BLOOD PRESSURE: 71 MMHG | SYSTOLIC BLOOD PRESSURE: 118 MMHG | TEMPERATURE: 98.8 F

## 2021-10-31 DIAGNOSIS — R10.9 ABDOMINAL PAIN IN PREGNANCY, THIRD TRIMESTER: ICD-10-CM

## 2021-10-31 DIAGNOSIS — O26.893 ABDOMINAL PAIN IN PREGNANCY, THIRD TRIMESTER: ICD-10-CM

## 2021-10-31 DIAGNOSIS — U07.1 COVID-19: ICD-10-CM

## 2021-10-31 LAB
ALBUMIN UR-MCNC: 50 MG/DL
ANION GAP SERPL CALCULATED.3IONS-SCNC: 8 MMOL/L (ref 3–14)
APPEARANCE UR: CLEAR
BASOPHILS # BLD AUTO: 0 10E3/UL (ref 0–0.2)
BASOPHILS NFR BLD AUTO: 0 %
BILIRUB UR QL STRIP: NEGATIVE
BUN SERPL-MCNC: 7 MG/DL (ref 7–30)
CALCIUM SERPL-MCNC: 8.5 MG/DL (ref 8.5–10.1)
CHLORIDE BLD-SCNC: 105 MMOL/L (ref 94–109)
CO2 SERPL-SCNC: 25 MMOL/L (ref 20–32)
COLOR UR AUTO: YELLOW
CREAT SERPL-MCNC: 0.63 MG/DL (ref 0.52–1.04)
EOSINOPHIL # BLD AUTO: 0 10E3/UL (ref 0–0.7)
EOSINOPHIL NFR BLD AUTO: 0 %
ERYTHROCYTE [DISTWIDTH] IN BLOOD BY AUTOMATED COUNT: 13.6 % (ref 10–15)
GFR SERPL CREATININE-BSD FRML MDRD: >90 ML/MIN/1.73M2
GLUCOSE BLD-MCNC: 103 MG/DL (ref 70–99)
GLUCOSE UR STRIP-MCNC: 500 MG/DL
HCT VFR BLD AUTO: 34.1 % (ref 35–47)
HGB BLD-MCNC: 10.9 G/DL (ref 11.7–15.7)
HGB UR QL STRIP: NEGATIVE
HOLD SPECIMEN: NORMAL
IMM GRANULOCYTES # BLD: 0.1 10E3/UL
IMM GRANULOCYTES NFR BLD: 3 %
KETONES UR STRIP-MCNC: 20 MG/DL
LEUKOCYTE ESTERASE UR QL STRIP: NEGATIVE
LYMPHOCYTES # BLD AUTO: 0.8 10E3/UL (ref 0.8–5.3)
LYMPHOCYTES NFR BLD AUTO: 19 %
MCH RBC QN AUTO: 29.9 PG (ref 26.5–33)
MCHC RBC AUTO-ENTMCNC: 32 G/DL (ref 31.5–36.5)
MCV RBC AUTO: 94 FL (ref 78–100)
MONOCYTES # BLD AUTO: 0.3 10E3/UL (ref 0–1.3)
MONOCYTES NFR BLD AUTO: 6 %
MUCOUS THREADS #/AREA URNS LPF: PRESENT /LPF
NEUTROPHILS # BLD AUTO: 3.1 10E3/UL (ref 1.6–8.3)
NEUTROPHILS NFR BLD AUTO: 72 %
NITRATE UR QL: NEGATIVE
NRBC # BLD AUTO: 0 10E3/UL
NRBC BLD AUTO-RTO: 0 /100
PH UR STRIP: 6.5 [PH] (ref 5–7)
PLATELET # BLD AUTO: 147 10E3/UL (ref 150–450)
POTASSIUM BLD-SCNC: 3.4 MMOL/L (ref 3.4–5.3)
RBC # BLD AUTO: 3.64 10E6/UL (ref 3.8–5.2)
RBC URINE: 1 /HPF
SODIUM SERPL-SCNC: 138 MMOL/L (ref 133–144)
SP GR UR STRIP: 1.03 (ref 1–1.03)
SQUAMOUS EPITHELIAL: 2 /HPF
UROBILINOGEN UR STRIP-MCNC: NORMAL MG/DL
WBC # BLD AUTO: 4.3 10E3/UL (ref 4–11)
WBC URINE: 1 /HPF

## 2021-10-31 PROCEDURE — 85025 COMPLETE CBC W/AUTO DIFF WBC: CPT | Performed by: EMERGENCY MEDICINE

## 2021-10-31 PROCEDURE — 93005 ELECTROCARDIOGRAM TRACING: CPT

## 2021-10-31 PROCEDURE — 81001 URINALYSIS AUTO W/SCOPE: CPT | Performed by: EMERGENCY MEDICINE

## 2021-10-31 PROCEDURE — 250N000013 HC RX MED GY IP 250 OP 250 PS 637: Performed by: EMERGENCY MEDICINE

## 2021-10-31 PROCEDURE — 82374 ASSAY BLOOD CARBON DIOXIDE: CPT | Performed by: EMERGENCY MEDICINE

## 2021-10-31 PROCEDURE — 96360 HYDRATION IV INFUSION INIT: CPT

## 2021-10-31 PROCEDURE — 258N000003 HC RX IP 258 OP 636: Performed by: EMERGENCY MEDICINE

## 2021-10-31 PROCEDURE — 36415 COLL VENOUS BLD VENIPUNCTURE: CPT | Performed by: EMERGENCY MEDICINE

## 2021-10-31 PROCEDURE — 94640 AIRWAY INHALATION TREATMENT: CPT

## 2021-10-31 PROCEDURE — 71045 X-RAY EXAM CHEST 1 VIEW: CPT

## 2021-10-31 PROCEDURE — 99285 EMERGENCY DEPT VISIT HI MDM: CPT | Mod: 25

## 2021-10-31 RX ORDER — ALBUTEROL SULFATE 90 UG/1
4 AEROSOL, METERED RESPIRATORY (INHALATION) ONCE
Status: COMPLETED | OUTPATIENT
Start: 2021-10-31 | End: 2021-10-31

## 2021-10-31 RX ADMIN — ALBUTEROL SULFATE 4 PUFF: 108 INHALANT RESPIRATORY (INHALATION) at 10:23

## 2021-10-31 RX ADMIN — SODIUM CHLORIDE, POTASSIUM CHLORIDE, SODIUM LACTATE AND CALCIUM CHLORIDE 1000 ML: 600; 310; 30; 20 INJECTION, SOLUTION INTRAVENOUS at 10:23

## 2021-10-31 NOTE — ED TRIAGE NOTES
Pt arrives via EMS from home d/t SOB and lower back pain since 10/27. States she tested positive for COVID 10/27. C/o cough, fever, chills. Pt , approx 27 weeks, no vag bleeding or complications with this pregnancy. EKG SR. Sating 98% on RA. Tylenol PTA. No dysuria. ABC intact. A&Ox4.

## 2021-10-31 NOTE — PROVIDER NOTIFICATION
10/31/21 1107   Fetal Assessment   Fetal Movement active   Fetal HR Assessment Method external US   Fetal HR (beats/min) 145   Fetal Heart Baseline Rate normal range   Fetal HR Variability moderate (amplitude range 6 to 25 bpm)   Fetal HR Accelerations absent   Fetal HR Decelerations absent   RN Strip Review Continuous   NST Medical Indication COVID concerns   NST Start Time 1037   NST Stop Time 1107   NST Extended Time No   Non-stress Test Interpretation Appropriate for gestational age   Fetal monitoring completed with MD approval.  Moderate variability, no accels, no decels, FHR baseline 145bpm.  Pt denies vaginal bleeding or leaking of fluid.  Pt reports mild abdominal tenderness that she associates with coughing.  Pt denies other pregnancy related questions or concerns.  Primary ED provider updated.

## 2021-10-31 NOTE — PLAN OF CARE
Dr Rice called and notified of 20 minute FHR tracing with baseline of 145bpm, moderate variability, no accelerations and no uterine activity. MD states if tracing looks reassuring for 27 weeks no need to for further fetal monitoring. MD states if pt needs to be admitted due to COVID symptoms pt will have to transfer to [facility] due to gestational age.

## 2021-10-31 NOTE — ED PROVIDER NOTES
History   Chief Complaint:  Back Pain and Covid Concern    The history is provided by the patient.      31-year-old female presenting to emergency room with cough shortness of breath and low back pain.  She is known to be Covid positive positive test was through [facility].  She has had 4 to 5 days worth of symptoms.  Intermittent low-grade fevers, no sore throat, cough is nonproductive.  No vomiting or diarrhea.  No dysuria frequency urgency.  She is currently 27 weeks pregnant.  No vaginal bleeding.  She describes low back pain, no recent trauma.  Occasionally the pain will radiate towards the left lower quadrant.    Review of Systems      ROS: 10 point ROS neg other than the symptoms noted above in the HPI.      Allergies:  Prochlorperazine  Ranitidine    Medications:  Pepcid  Ferosul  Zofran  Phenergan    Past Medical History:    Anemia  Constipation  Eczema, dyshidrotic  Migraines  Varicella  Hyperemesis gravidarum  Jennifer-Hall tear   GERD     Past Surgical History:    Appendectomy      Family History:    Mother: hypertension    Social History:  The patient presents to the ED alone.     Physical Exam     Patient Vitals for the past 24 hrs:   BP Temp Temp src Pulse Resp SpO2   10/31/21 1145 -- -- -- -- -- 100 %   10/31/21 1130 118/71 -- -- 90 -- --   10/31/21 1115 -- -- -- -- -- 99 %   10/31/21 1100 112/68 -- -- 86 -- 99 %   10/31/21 1045 -- -- -- -- -- 100 %   10/31/21 1030 113/73 -- -- 93 -- 99 %   10/31/21 1015 -- -- -- -- -- 98 %   10/31/21 1000 118/72 -- -- 91 29 99 %   10/31/21 0952 129/74 98.8  F (37.1  C) Oral 94 (!) 48 99 %       Physical Exam    HENT:  mmm, no rhinorrhea  Eyes: periorbital tissues and sclera normal   Neck: supple, no abnormal swelling  Lungs: Mild tachypnea, lungs clear  CV: rrr, no m/r/g, ppi  Abd: Gravid uterus appropriate for dates, no significant localizing tenderness palpation  Ext: no peripheral edema  Skin: warm, dry, well perfused, no rashes/bruising/lesions on exposed  skin  Neuro: alert, MAEE, no gross motor or sensory deficits, gait stable  Psych: Normal mood, normal affect    Emergency Department Course   ECG  ECG taken at 0956, ECG read at 1003  Normal sinus rhythm. Nonspecific ST and T wave abnormality. Abnormal ECG.    No significant change as compared to prior, dated 12/19/15.  Rate 94 bpm. GA interval 178 ms. QRS duration 70 ms. QT/QTc 326/407 ms. P-R-T axes 30 20 1.     Imaging:  XR chest port 1 view  Mild hazy opacities in both lower lungs could be infectious in etiology. The lungs are otherwise clear. No pneumothorax. Pulmonary vascularity is within normal limits.  As per radiology.    Laboratory:  CBC: WBC 4.3, HGB 10.9(L), (L)     BMP: Glucose: 103(H) o/w WNL (Creatinine 0.63)     UA with microscopic: PENDING    Emergency Department Course:    Reviewed:  I reviewed nursing notes, vitals, past medical history and care everywhere    Assessments:  1004 I obtained history and examined the patient as noted above.   1120 I rechecked the patient and explained findings.     Interventions:  1023 Lactated ringers 1 L IV  1023 Albuterol 4 puffs inhalation     Disposition:  The patient was discharged to home.     Impression & Plan     Medical Decision Makin-year-old female with known Covid and also 27 weeks pregnant here with cough shortness of breath as well as abdominal pain.  Fetal monitoring done here in the ED and was reassuring.  No concerns from an OB standpoint.  I have a low suspicion for ovarian torsion diverticulitis or other acute surgical vascular or infectious abdominal process I do not think she needs advanced imaging in that regard.  Urinalysis was checked and shows no signs of infection.  From a Covid standpoint she has radiographic changes consistent with Covid.  She is not hypoxic tachypnea improved with time in the ED and albuterol.  From a respiratory standpoint she is safe for discharge home.  We will give her a home oximeter and referred to the  get well loop.  She was comfortable and agreeable with that plan.    Covid-19  Romarka O Obioma was evaluated during a global COVID-19 pandemic, which necessitated consideration that the patient might be at risk for infection with the SARS-CoV-2 virus that causes COVID-19.   Applicable protocols for evaluation were followed during the patient's care.     Diagnosis:    ICD-10-CM    1. COVID-19  U07.1 COVID-19 GetWell Loop Referral     Care Coordination Referral   2. Abdominal pain in pregnancy, third trimester  O26.893     R10.9        Discharge Medications:  None    Scribe Disclosure:  I, Leroy Lopez, am serving as a scribe at 10:02 AM on 10/31/2021 to document services personally performed by Param Guzman MD based on my observations and the provider's statements to me.            Param Guzman MD  10/31/21 2255

## 2021-10-31 NOTE — DISCHARGE INSTRUCTIONS
Tylenol for pain or discomfort    Keep yourself well-hydrated    Albuterol 2 to 4 puffs every 6 hours as needed for cough or wheezing shortness of breath

## 2021-10-31 NOTE — PROVIDER NOTIFICATION
10/31/21 1014   Provider Notification   Provider Name/Title Dr. Rice   Method of Notification Phone   Dr. Rice informed of patient arrival and assessment including the following:    Reason for maternal/fetal assessment COVID related concerns. Per prenatal, pt diagnosed with COVID in the last week.  Read admitting ER note to OB provider.  Plan per provider/orders received for NST, and if pt needs to be admitted for management of COVID symptoms, OB recommendation would be to transfer to [facility] d/t pt's gestational age.  Will go to ED for fetal and uterine monitoring.

## 2021-10-31 NOTE — PROVIDER NOTIFICATION
NST started at this time - all previous monitoring, paper not feeding correctly and documenting inaccurate FHR baseline.

## 2021-11-01 ENCOUNTER — PATIENT OUTREACH (OUTPATIENT)
Dept: CARE COORDINATION | Facility: CLINIC | Age: 31
End: 2021-11-01

## 2021-11-01 ENCOUNTER — APPOINTMENT (OUTPATIENT)
Dept: MRI IMAGING | Facility: CLINIC | Age: 31
End: 2021-11-01
Attending: OBSTETRICS & GYNECOLOGY
Payer: COMMERCIAL

## 2021-11-01 ENCOUNTER — HOSPITAL ENCOUNTER (OUTPATIENT)
Facility: CLINIC | Age: 31
Discharge: HOME | End: 2021-11-02
Attending: OBSTETRICS & GYNECOLOGY | Admitting: OBSTETRICS & GYNECOLOGY
Payer: COMMERCIAL

## 2021-11-01 ENCOUNTER — APPOINTMENT (OUTPATIENT)
Dept: ULTRASOUND IMAGING | Facility: CLINIC | Age: 31
End: 2021-11-01
Attending: OBSTETRICS & GYNECOLOGY
Payer: COMMERCIAL

## 2021-11-01 LAB
ALBUMIN SERPL-MCNC: 2.7 G/DL (ref 3.4–5)
ALBUMIN UR-MCNC: 30 MG/DL
ALBUMIN UR-MCNC: 70 MG/DL
ALP SERPL-CCNC: 78 U/L (ref 40–150)
ALT SERPL W P-5'-P-CCNC: 28 U/L (ref 0–50)
ANION GAP SERPL CALCULATED.3IONS-SCNC: 6 MMOL/L (ref 3–14)
APPEARANCE UR: CLEAR
APPEARANCE UR: CLEAR
AST SERPL W P-5'-P-CCNC: 25 U/L (ref 0–45)
ATRIAL RATE - MUSE: 94 BPM
BILIRUB SERPL-MCNC: 0.3 MG/DL (ref 0.2–1.3)
BILIRUB UR QL STRIP: NEGATIVE
BILIRUB UR QL STRIP: NEGATIVE
BUN SERPL-MCNC: 10 MG/DL (ref 7–30)
CALCIUM SERPL-MCNC: 8.5 MG/DL (ref 8.5–10.1)
CHLORIDE BLD-SCNC: 106 MMOL/L (ref 94–109)
CO2 SERPL-SCNC: 26 MMOL/L (ref 20–32)
COLOR UR AUTO: YELLOW
COLOR UR AUTO: YELLOW
CREAT SERPL-MCNC: 0.52 MG/DL (ref 0.52–1.04)
DIASTOLIC BLOOD PRESSURE - MUSE: NORMAL MMHG
ERYTHROCYTE [DISTWIDTH] IN BLOOD BY AUTOMATED COUNT: 13.6 % (ref 10–15)
GFR SERPL CREATININE-BSD FRML MDRD: >90 ML/MIN/1.73M2
GLUCOSE BLD-MCNC: 124 MG/DL (ref 70–99)
GLUCOSE UR STRIP-MCNC: 1000 MG/DL
GLUCOSE UR STRIP-MCNC: 500 MG/DL
HCT VFR BLD AUTO: 32.9 % (ref 35–47)
HGB BLD-MCNC: 10.9 G/DL (ref 11.7–15.7)
HGB UR QL STRIP: NEGATIVE
HGB UR QL STRIP: NEGATIVE
INTERPRETATION ECG - MUSE: NORMAL
KETONES UR STRIP-MCNC: 150 MG/DL
KETONES UR STRIP-MCNC: >150 MG/DL
LEUKOCYTE ESTERASE UR QL STRIP: NEGATIVE
LEUKOCYTE ESTERASE UR QL STRIP: NEGATIVE
MCH RBC QN AUTO: 31 PG (ref 26.5–33)
MCHC RBC AUTO-ENTMCNC: 33.1 G/DL (ref 31.5–36.5)
MCV RBC AUTO: 94 FL (ref 78–100)
MUCOUS THREADS #/AREA URNS LPF: PRESENT /LPF
MUCOUS THREADS #/AREA URNS LPF: PRESENT /LPF
NITRATE UR QL: NEGATIVE
NITRATE UR QL: NEGATIVE
P AXIS - MUSE: 30 DEGREES
PH UR STRIP: 6 [PH] (ref 5–7)
PH UR STRIP: 6.5 [PH] (ref 5–7)
PLATELET # BLD AUTO: 147 10E3/UL (ref 150–450)
POTASSIUM BLD-SCNC: 3.5 MMOL/L (ref 3.4–5.3)
PR INTERVAL - MUSE: 178 MS
PROT SERPL-MCNC: 6.8 G/DL (ref 6.8–8.8)
QRS DURATION - MUSE: 70 MS
QT - MUSE: 326 MS
QTC - MUSE: 407 MS
R AXIS - MUSE: 20 DEGREES
RBC # BLD AUTO: 3.52 10E6/UL (ref 3.8–5.2)
RBC URINE: 0 /HPF
RBC URINE: 1 /HPF
SODIUM SERPL-SCNC: 138 MMOL/L (ref 133–144)
SP GR UR STRIP: 1.02 (ref 1–1.03)
SP GR UR STRIP: 1.03 (ref 1–1.03)
SQUAMOUS EPITHELIAL: <1 /HPF
SYSTOLIC BLOOD PRESSURE - MUSE: NORMAL MMHG
T AXIS - MUSE: 1 DEGREES
UROBILINOGEN UR STRIP-MCNC: 2 MG/DL
UROBILINOGEN UR STRIP-MCNC: 3 MG/DL
VENTRICULAR RATE- MUSE: 94 BPM
WBC # BLD AUTO: 4.6 10E3/UL (ref 4–11)
WBC URINE: 2 /HPF
WBC URINE: 3 /HPF

## 2021-11-01 PROCEDURE — 250N000013 HC RX MED GY IP 250 OP 250 PS 637: Performed by: OBSTETRICS & GYNECOLOGY

## 2021-11-01 PROCEDURE — 74181 MRI ABDOMEN W/O CONTRAST: CPT

## 2021-11-01 PROCEDURE — 36415 COLL VENOUS BLD VENIPUNCTURE: CPT | Performed by: OBSTETRICS & GYNECOLOGY

## 2021-11-01 PROCEDURE — G0463 HOSPITAL OUTPT CLINIC VISIT: HCPCS | Mod: 25

## 2021-11-01 PROCEDURE — 81001 URINALYSIS AUTO W/SCOPE: CPT | Performed by: OBSTETRICS & GYNECOLOGY

## 2021-11-01 PROCEDURE — 82040 ASSAY OF SERUM ALBUMIN: CPT | Performed by: OBSTETRICS & GYNECOLOGY

## 2021-11-01 PROCEDURE — 250N000011 HC RX IP 250 OP 636: Performed by: OBSTETRICS & GYNECOLOGY

## 2021-11-01 PROCEDURE — 258N000003 HC RX IP 258 OP 636: Performed by: OBSTETRICS & GYNECOLOGY

## 2021-11-01 PROCEDURE — 81003 URINALYSIS AUTO W/O SCOPE: CPT | Performed by: OBSTETRICS & GYNECOLOGY

## 2021-11-01 PROCEDURE — 96372 THER/PROPH/DIAG INJ SC/IM: CPT | Mod: XS | Performed by: OBSTETRICS & GYNECOLOGY

## 2021-11-01 PROCEDURE — 85027 COMPLETE CBC AUTOMATED: CPT | Performed by: OBSTETRICS & GYNECOLOGY

## 2021-11-01 PROCEDURE — 76770 US EXAM ABDO BACK WALL COMP: CPT

## 2021-11-01 PROCEDURE — 87086 URINE CULTURE/COLONY COUNT: CPT | Performed by: OBSTETRICS & GYNECOLOGY

## 2021-11-01 PROCEDURE — 999N000127 HC STATISTIC PERIPHERAL IV START W US GUIDANCE

## 2021-11-01 PROCEDURE — 96374 THER/PROPH/DIAG INJ IV PUSH: CPT

## 2021-11-01 RX ORDER — ACETAMINOPHEN 500 MG
1000 TABLET ORAL ONCE
Status: COMPLETED | OUTPATIENT
Start: 2021-11-01 | End: 2021-11-01

## 2021-11-01 RX ORDER — HYDROXYZINE HYDROCHLORIDE 50 MG/1
100 TABLET, FILM COATED ORAL ONCE
Status: COMPLETED | OUTPATIENT
Start: 2021-11-01 | End: 2021-11-01

## 2021-11-01 RX ORDER — HYDROXYZINE HYDROCHLORIDE 50 MG/1
50 TABLET, FILM COATED ORAL ONCE
Status: COMPLETED | OUTPATIENT
Start: 2021-11-01 | End: 2021-11-01

## 2021-11-01 RX ORDER — SENNOSIDES 8.6 MG
1-2 TABLET ORAL 2 TIMES DAILY
Status: DISCONTINUED | OUTPATIENT
Start: 2021-11-01 | End: 2021-11-02 | Stop reason: HOSPADM

## 2021-11-01 RX ORDER — HYDROMORPHONE HYDROCHLORIDE 1 MG/ML
0.5 INJECTION, SOLUTION INTRAMUSCULAR; INTRAVENOUS; SUBCUTANEOUS ONCE
Status: COMPLETED | OUTPATIENT
Start: 2021-11-01 | End: 2021-11-01

## 2021-11-01 RX ORDER — MORPHINE SULFATE 10 MG/ML
10 INJECTION, SOLUTION INTRAMUSCULAR; INTRAVENOUS ONCE
Status: COMPLETED | OUTPATIENT
Start: 2021-11-01 | End: 2021-11-01

## 2021-11-01 RX ORDER — BISACODYL 10 MG
10 SUPPOSITORY, RECTAL RECTAL DAILY PRN
Status: DISCONTINUED | OUTPATIENT
Start: 2021-11-01 | End: 2021-11-02 | Stop reason: HOSPADM

## 2021-11-01 RX ADMIN — HYDROXYZINE HYDROCHLORIDE 100 MG: 50 TABLET, FILM COATED ORAL at 15:49

## 2021-11-01 RX ADMIN — SODIUM CHLORIDE, POTASSIUM CHLORIDE, SODIUM LACTATE AND CALCIUM CHLORIDE 1000 ML: 600; 310; 30; 20 INJECTION, SOLUTION INTRAVENOUS at 19:37

## 2021-11-01 RX ADMIN — HYDROXYZINE HYDROCHLORIDE 100 MG: 50 TABLET, FILM COATED ORAL at 23:09

## 2021-11-01 RX ADMIN — HYDROMORPHONE HYDROCHLORIDE 0.5 MG: 1 INJECTION, SOLUTION INTRAMUSCULAR; INTRAVENOUS; SUBCUTANEOUS at 17:42

## 2021-11-01 RX ADMIN — ACETAMINOPHEN 1000 MG: 500 TABLET, FILM COATED ORAL at 15:50

## 2021-11-01 RX ADMIN — SODIUM CHLORIDE, POTASSIUM CHLORIDE, SODIUM LACTATE AND CALCIUM CHLORIDE 2000 ML: 600; 310; 30; 20 INJECTION, SOLUTION INTRAVENOUS at 18:10

## 2021-11-01 RX ADMIN — BISACODYL 10 MG: 10 SUPPOSITORY RECTAL at 19:05

## 2021-11-01 RX ADMIN — MORPHINE SULFATE 10 MG: 10 INJECTION INTRAVENOUS at 23:09

## 2021-11-01 NOTE — PLAN OF CARE
Olimpia here with c/o of left sided pain-  Starts in her back, wraps around the front and radiates down her leg.  It started this morning.  States it comes about every 5 minutes and is very sharp. She is moaning and writhing in bed when it occurs.  Denies ctx's, bldg or LOF.  Baby has been active.  Her  states that she does have a hx of left ovarian cyst that has caused her pain in the past- Rofina confirms that the pain feels very similar to pain related to the cyst.  FHR appropriate for gestational age.  Abdomen soft throughout.  Stated will update MD for further orders.  Stated understanding

## 2021-11-01 NOTE — PROGRESS NOTES
Care Coordination ED Discharge Follow up Note  Pt on home virtual monitoring program for COVID-19, call made to do assessment, verify follow-up appt and offer care coordination, no answer. Left message asking pt to make appt for virtual visit in the next one to two days, reminder to monitor O2 Q4 hrs w/a, return to ED if O2 sats <95% and to accept the invitation to participate in GetWell Loop. RN will call back tomorrow.

## 2021-11-01 NOTE — PROVIDER NOTIFICATION
11/01/21 1520   Provider Notification   Provider Name/Title Dr. Oviedo   Method of Notification Phone   Request Evaluate - Remote   Notification Reason Patient Arrived;SVE;Pain   MD informed of pt arrival, OB hx, complaints and pain (see previous arrival note).    TORB to give pt 1000mg tylenol PO, 100mg vistaril PO for pain and obtain U/A reflex to microscopic and culture. Will update MD with results.

## 2021-11-01 NOTE — PROVIDER NOTIFICATION
11/01/21 1543   Provider Notification   Provider Name/Title Dr. Oviedo   Method of Notification Phone   Request Evaluate - Remote   Notification Reason Status Update   MD updated urine collected, visibly concentrated, dark orange. Pt states she has not had much to drink and dose not feel like drinking water, confirms she may be dehydrated. TORB to place IV and give 2L LR, 500mL/hr.

## 2021-11-01 NOTE — PROVIDER NOTIFICATION
11/01/21 1641   Provider Notification   Provider Name/Title Dr. Oviedo   Method of Notification Phone   Request Evaluate - Remote   Notification Reason Lab/Diagnostic Study;Status Update   MD updated on results of U/A (see flowsheet). Review of pt symptoms, VSS, pain continues to remain in LLQ and radiate down left leg, tylenol and vistaril do not appear to be giving pt any pain relief. MD informed pt did present yesterday to ED w/ Covid related symptoms, reviewed ED and OB notes, U/A and blood work from yesterday (see flowsheet). Currently working on getting an IV in pt.     TORB for CMP and CBC w/plts, may offer pt flexeril for pain 10mg PO, MD to come to bedside to evaluate.

## 2021-11-01 NOTE — PROVIDER NOTIFICATION
11/01/21 8802   Provider Notification   Provider Name/Title Dr. Oviedo   Method of Notification Phone   Request Evaluate - Remote   Notification Reason Pain;Lab/Diagnostic Study   MD updated on pt's pain- pt stated the dilaudid did give her some relief during the U/S but stated her pain came back 10/10 when she returned. VSS. Reviewed preliminary report of renal U/S (see imaging).     TORB to give pt ordered suppository and check cervix. Plan to see if pt able to get any relief from a BM, FHR Cat 1, no ctx - Ok to discontinue FHR monitoring for now per MD. Will continue to monitor and update MD for further orders.

## 2021-11-01 NOTE — PROGRESS NOTES
Triage Note    Ms. Olimpia ALMARAZ Obioma 31 year old  27w1d presents for acute onset LLQ abdominal/left sided back pain that started about an hour prior to her arrival. States that the pain radiates down her left lower extremity. Sharp in character. She was seen in the ER yesterday morning for the same pain in addition to respiratory complaints that included dry cough and shortness of breath. She had been diagnosed with COVID about a week ago at a [facility] pharmacy. Her ER evaluation was unremarkable, and she was discharged with instructions with instructions to return if her respiratory symptoms worsen.     She denies dysuria, hematuria, and urinary frequency. Denies vaginal bleeding and vaginal leakage of fluid. Denies contractions. Her last bowel movement was this morning but notes that it was hard stool and involved the need to strain. Her oral intake has been limited as she has not had much to eat all day today. She reports her last meal was last and it was oatmeal and her last drink was this morning which was a sip of tea. She acknowledges nausea but denies vomiting. Denies fevers and chills.     Vitals:    21 1451 21 1501   BP:  108/62   Resp:  22   Temp: 98.5  F (36.9  C) 98.8  F (37.1  C)   TempSrc: Oral Oral     General Appearance: Occasional distress noted when reporting her history and then writhes in pain unable to stay still in her bed  Abdomen: Tenderness noted at her LLQ abdomen and left lower back; no CVA tenderness.   Pelvic: Cervix was assess by RN and noted to be closed cervix     FHT: baseline 145, moderate variability, accelerations absent, deceleration absent   [place]: none    Labs:   Component      Latest Ref Rng & Units 2021   Color Urine      Colorless, Straw, Light Yellow, Yellow Yellow   Appearance Urine      Clear Clear   Glucose Urine      Negative mg/dL 1000 (A)   Bilirubin Urine      Negative Negative   Ketones Urine      Negative mg/dL 150 (A)   Specific Gravity Urine       1.003 - 1.035 1.020   Blood Urine      Negative Negative   pH Urine      5.0 - 7.0 6.0   Protein Albumin Urine      Negative mg/dL 70 (A)   Urobilinogen mg/dL      Normal, 2.0 mg/dL 3.0 (A)   Nitrite Urine      Negative Negative   Leukocyte Esterase Urine      Negative Negative   RBC Urine      <=2 /HPF 1   WBC Urine      <=5 /HPF 3   Squamous Epithelial /HPF Urine      <=1 /HPF <1   Mucus Urine      None Seen /LPF Present (A)   Sodium      133 - 144 mmol/L 138   Potassium      3.4 - 5.3 mmol/L 3.5   Chloride      94 - 109 mmol/L 106   Carbon Dioxide      20 - 32 mmol/L 26   Anion Gap      3 - 14 mmol/L 6   Urea Nitrogen      7 - 30 mg/dL 10   Creatinine      0.52 - 1.04 mg/dL 0.52   Calcium      8.5 - 10.1 mg/dL 8.5   Glucose      70 - 99 mg/dL 124 (H)   Alkaline Phosphatase      40 - 150 U/L 78   AST      0 - 45 U/L 25   ALT      0 - 50 U/L 28   Protein Total      6.8 - 8.8 g/dL 6.8   Albumin      3.4 - 5.0 g/dL 2.7 (L)   Bilirubin Total      0.2 - 1.3 mg/dL 0.3   GFR Estimate      >60 mL/min/1.73m2 >90   WBC      4.0 - 11.0 10e3/uL 4.6   RBC Count      3.80 - 5.20 10e6/uL 3.52 (L)   Hemoglobin      11.7 - 15.7 g/dL 10.9 (L)   Hematocrit      35.0 - 47.0 % 32.9 (L)   MCV      78 - 100 fL 94   MCH      26.5 - 33.0 pg 31.0   MCHC      31.5 - 36.5 g/dL 33.1   RDW      10.0 - 15.0 % 13.6   Platelet Count      150 - 450 10e3/uL 147 (L)       A/P: LLQ abdominal pain, low left back pain  -- unclear etiology   -- low suspicion for obstetric concerns ie  labor, placenta abruption given no contractions noted on tocometer nor palpated, closed cervix, Category 1 fetal heart tracing, and absence of vaginal bleeding  -- given HPI mention of hardened stool with her last bowel movement, constipation could be playing a role; also, consider nephrolithiasis/ureterolithiasis given her writhing pain demonstration   -- low suspicion for gynecological cause ie ovarian cyst as this is uncommon in the setting of pregnancy  -- will  order renal ultrasound; consider abdominal MRI if ultrasound is not revealing  -- IV fluid hydration will be administered  -- s/p Tylenol PO, Vistaril PO and Flexeril PO have not been effective in reducing her pain; will administer IV dilaudid x 1 dose   -- NPO for now until cause of her pain can  be elucidated    Tanisha Oviedo MD  [facility]

## 2021-11-01 NOTE — PROVIDER NOTIFICATION
11/01/21 5095   Provider Notification   Provider Name/Title Dr. Oviedo   Method of Notification At Bedside   Request Evaluate in Person   MD at bedside to evaluate pt, reviewed symptoms/pain. Pt states she had 1 BM this morning that was hard. Pt agrees she may be constipated. Pt crying, states stabbing pain in lower left abdomen and left leg is now in her back, describes left flank pain.     VORB for renal and abdominal U/S and to give 0.5mg IV dilaudid for pain.

## 2021-11-02 VITALS
SYSTOLIC BLOOD PRESSURE: 121 MMHG | RESPIRATION RATE: 24 BRPM | TEMPERATURE: 98.1 F | OXYGEN SATURATION: 98 % | DIASTOLIC BLOOD PRESSURE: 74 MMHG

## 2021-11-02 PROCEDURE — 99214 OFFICE O/P EST MOD 30 MIN: CPT | Performed by: OBSTETRICS & GYNECOLOGY

## 2021-11-02 PROCEDURE — 258N000003 HC RX IP 258 OP 636: Performed by: OBSTETRICS & GYNECOLOGY

## 2021-11-02 PROCEDURE — 250N000013 HC RX MED GY IP 250 OP 250 PS 637: Performed by: OBSTETRICS & GYNECOLOGY

## 2021-11-02 RX ORDER — ACETAMINOPHEN 325 MG/1
975 TABLET ORAL EVERY 6 HOURS PRN
Status: DISCONTINUED | OUTPATIENT
Start: 2021-11-02 | End: 2021-11-02 | Stop reason: HOSPADM

## 2021-11-02 RX ADMIN — ACETAMINOPHEN 975 MG: 325 TABLET, FILM COATED ORAL at 09:27

## 2021-11-02 RX ADMIN — SENNOSIDES 2 TABLET: 8.6 TABLET, FILM COATED ORAL at 09:27

## 2021-11-02 RX ADMIN — SODIUM CHLORIDE, POTASSIUM CHLORIDE, SODIUM LACTATE AND CALCIUM CHLORIDE 1000 ML: 600; 310; 30; 20 INJECTION, SOLUTION INTRAVENOUS at 10:00

## 2021-11-02 NOTE — PROVIDER NOTIFICATION
11/02/21 0397   Provider Notification   Provider Name/Title Dr Maru Antunez   Method of Notification Phone   Notification Reason Maternal Vital Sign Change   Updated MD that pt tried to have a bowel movement and while straining, her LLQ pain increased and so did her RR, upon returning to bed, LLQ pain is gone but RR is 35/min while pt sitting straight up. MD gave TORB to see if is subsides and call back after reevaluation.

## 2021-11-02 NOTE — PROVIDER NOTIFICATION
"   11/01/21 2057   Provider Notification   Provider Name/Title Dr. Oviedo   Method of Notification Phone   Request Evaluate-Remote   Notification Reason Status Update     Notified MD no change in pain per pt, pt was writhing in pain when this RN came on stating \"why is this happening to me?\" and \"can they just take the baby out?\" Pt then went to the bathroom and had a small BM and reported some relief after that but the pain came back shortly after.     TORB for MRI of abdomen and pelvis without contrast, ok to SL IV when 2L complete to allow pt rest, repeat urinalysis to check ketones, start senna 1-2 tablets BID in the morning, after MRI ok to give vistaril 100 mg po once and morphine 10 mg IV once, start continuous monitoring if patient takes morphine. Call to update MD with MRI results.   "

## 2021-11-02 NOTE — PROVIDER NOTIFICATION
11/02/21 0248   Provider Notification   Provider Name/Title Dr. Oviedo   Method of Notification Phone   Request Evaluate - Remote   Notification Reason Lab/Diagnostic Study     Reviewed MRI results and repeat urinalysis. Pt has been resting off and on but is still experiencing pain. FHT normal baseline, minimal with periods of moderate, 10x10 accelerations present, no decelerations. TORB for Tylenol 975 mg po q6 hr prn for pain.

## 2021-11-02 NOTE — PROVIDER NOTIFICATION
11/02/21 1349   Vitals   /74   Resp (!) 35   Pt returned from bathroom, stated while trying to have a bowel movement and straining, her LLQ pain increased and she felt like her breathing was fast, pt states that now back to bed and not straining, her LLQ pain is gone but breathing is still fast. Pulse ox on, sats 100%, RR 35, lung sounds clear.

## 2021-11-02 NOTE — PROGRESS NOTES
Antepartum Progress Note    S: Got some sleep.  Pain is 3-4/10.  Would like some IV fluids this morning because she always feels better after that.  +FM, no ctx.  initialyl had a cough with her covid diagnosis, now asymptomatic.     O:  Vitals:    21 1828 21 1942 21 2306 21 0756   BP: 107/60 110/69 111/67 103/64   Resp:   18   Temp: 99.3  F (37.4  C) 98.6  F (37  C) 98.5  F (36.9  C) 98.1  F (36.7  C)   TempSrc: Oral Oral Oral Oral   SpO2: 99% 99% 99%      General: comfortable, lying in bed on her side  Abd: gravid, nontender to palpation    Tocometry: quiet  FHT: mod variability, appropriate for gestational age, no decels    A/P: 31 year old  @ 27w2d here for LLQ pain, now improved after BM, pain meds, and hydration/po intake.  Likely MSK/round ligament or constipation in nature.  Normal renal US and MRI pelvis.  Has follow-up appt tomorrow in clinic.  Plan for discharge this afternoon after IV fluids (per pt request) and po hydration/lunch.      Amna Antunez MD  [facility]

## 2021-11-02 NOTE — PROVIDER NOTIFICATION
11/02/21 1337   Provider Notification   Provider Name/Title Dr Maru Antunez   Method of Notification Phone   Request Evaluate - Remote   Notification Reason Status Update   Updated MD that pt ate part of breakfast around 1000, 1L LR bolus infused, pt's pain has stayed the same, tolerable, rating it 4/10, pt resting now and plan on checking pt at 1400. MD stated to update her on how pt is feeling after 1400.

## 2021-11-02 NOTE — PLAN OF CARE
Data: Patient assessed in the [facility] for abdominal pain.  Cervical exam closed.  Membranes intact.  Contractions/uterine assessment no contractions.  Action:  Presumed adequate fetal oxygenation documented (see flow record). Discharge instructions reviewed.  Patient instructed to report change in fetal movement, vaginal leaking of fluid or bleeding, abdominal pain, or any concerns related to the pregnancy to her nurse/physician. LLQ pain rated 0/10 per pt upon discharge.    Response: Orders to discharge home per Amna Antunez.  Patient verbalized understanding of education and verbalized agreement with plan. Discharged to home at 1525.

## 2021-11-02 NOTE — DISCHARGE SUMMARY
[facility] Discharge Summary    Olimpia Herrera MRN# [id number]   Age: 31 year old YOB: 1990     Date of Admission:  2021  Date of Discharge::  2021   Admitting Physician:  Tanisha Oviedo MD  Discharge Physician:  Amna Antunez MD            Admission Diagnoses:   1. 31 year old  at 27w1d    2. Covid positive  3. LLQ abdominal pain, severe          Discharge Diagnosis:     1. 31 year old  at 27w2d   2. Covid positive, symptoms resolved  3. LLQ pain, improved          Procedures:     Procedure(s):  serial fetal monitoring, renal ultrasound, MRI of abdomen            Medications Prior to Admission:     Medications Prior to Admission   Medication Sig Dispense Refill Last Dose     Prenatal Vit-Fe Fumarate-FA (PRENATAL VITAMINS PO)    10/31/2021 at Unknown time             Discharge Medications:     Current Discharge Medication List      CONTINUE these medications which have NOT CHANGED    Details   Prenatal Vit-Fe Fumarate-FA (PRENATAL VITAMINS PO)                    Consultations:   No consultations were requested during this admission          Brief History of Illness:   Olimpia Herrera is a 31 year old  at 27w1d presents for acute onset LLQ abdominal/left sided back pain that started about an hour prior to her arrival. States that the pain radiates down her left lower extremity. Sharp in character. She was seen in the ER yesterday morning for the same pain in addition to respiratory complaints that included dry cough and shortness of breath. She had been diagnosed with COVID about a week ago at a [facility] pharmacy. Her ER evaluation was unremarkable, and she was discharged with instructions with instructions to return if her respiratory symptoms worsen.      She denies dysuria, hematuria, and urinary frequency. Denies vaginal bleeding and vaginal leakage of fluid. Denies contractions. Her last bowel movement was this morning but notes that it was hard  stool and involved the need to strain. Her oral intake has been limited as she has not had much to eat all day today. She reports her last meal was last and it was oatmeal and her last drink was this morning which was a sip of tea. She acknowledges nausea but denies vomiting. Denies fevers and chills.               Hospital Course:    She was admitted to observation and given hydration and pain medications.  Her pain didn't initially respond to pain medications appropriately and a renal ultrasound and pelvic MRI were done to evaluate for other etiology, and they returned normal.  Her cervix was closed throughout her stay and no contractions were noted on serial tocometry.  On HD#2 her pain improved from 10/10 upon admission to 3-4/10, then to 0/10.  She was advised to continue po hydration/food intake, continue with a stool softener, and her pain was attributed to MSK/round ligament pain combined with constipation as it improved after a BM.            Discharge Instructions and Follow-Up:     Discharge diet: Regular   Discharge activity: Activity as tolerated   Discharge follow-up: Follow up with Dr. Rogers in 1 days               Discharge Disposition:     Discharged to home      Amna Antunez MD, MPH  [facility]        30 minutes spent on day of discharge with  >50% spent in counseling, coordination of care, face to face with patient , review of tests, unit floor time and discussion with other providers.

## 2021-11-02 NOTE — DISCHARGE INSTRUCTIONS
Discharge Instruction for Undelivered Patients      You were seen for: abdominal pain  We Consulted: Dr Maru Antunez  You had (Test or Medicine):IV fluids, Ultrasound, MRI and Lab work     Diet:   Drink 8 to 12 glasses of liquids (milk, juice, water) every day.  You may eat meals and snacks.     Activity:  Count fetal kicks everyday (see handout)  Call your doctor or nurse midwife if your baby is moving less than usual.     Call your provider if you notice:  Swelling in your face or increased swelling in your hands or legs.  Headaches that are not relieved by Tylenol (acetaminophen).  Changes in your vision (blurring: seeing spots or stars.)  Nausea (sick to your stomach) and vomiting (throwing up).   Weight gain of 5 pounds or more per week.  Heartburn that doesn't go away.  Signs of bladder infection: pain when you urinate (use the toilet), need to go more often and more urgently.  The bag of cason (rupture of membranes) breaks, or you notice leaking in your underwear.  Bright red blood in your underwear.  Abdominal (lower belly) or stomach pain.  Second (plus) baby: Contractions (tightening) less than 10 minutes apart and getting stronger.  *If less than 34 weeks: Contractions (tightening) more than 6 times in one hour.  Increase or change in vaginal discharge (note the color and amount)    You can take Pepcid AC (Famotidine) as directed for heartburn.    Follow-up:  As scheduled in the clinic     Discharge Instructions for COVID-19 Patients  You have--or may have--COVID-19. Please follow the instructions listed below.   If you have a weakened immune system, discuss with your doctor any other actions you need to take.  How can I protect others?  If you have symptoms (fever, cough, body aches or trouble breathing):    Stay home and away from others (self-isolate) until:  ? Your other symptoms have resolved (gotten better). And   ? You've had no fever--and no medicine that reduces fever--for 1 full day (24 hours).  "And   ? At least 10 days have passed since your symptoms started. (You may need to wait 20 days. Follow the advice of your care team.)  If you don't show symptoms, but testing showed that you have COVID-19:    Stay home and away from others (self-isolate) until at least 10 days have passed since the date of your first positive COVID-19 test.  During this time    Stay in your own room, even for meals. Use your own bathroom if you can.    Stay away from others in your home. No hugging, kissing or shaking hands. No visitors.    Don't go to work, school or anywhere else.    Clean \"high touch\" surfaces often (doorknobs, counters, handles). Use household cleaning spray or wipes.    You'll find a full list of  on the EPA website: www.epa.gov/pesticide-registration/list-n-disinfectants-use-against-sars-cov-2.    Cover your mouth and nose with a mask or other face covering to avoid spreading germs.    Wash your hands and face often. Use soap and water.    Caregivers in these groups are at risk for severe illness due to COVID-19:  ? People 65 years and older  ? People who live in a nursing home or long-term care facility  ? People with chronic disease (lung, heart, cancer, diabetes, kidney, liver, immunologic)  ? People who have a weakened immune system, including those who:    Are in cancer treatment    Take medicine that weakens the immune system, such as corticosteroids    Had a bone marrow or organ transplant    Have an immune deficiency    Have poorly controlled HIV or AIDS    Are obese (body mass index of 40 or higher)    Smoke regularly    Caregivers should wear gloves while washing dishes, handling laundry and cleaning bedrooms and bathrooms.    Use caution when washing and drying laundry: Don't shake dirty laundry and use the warmest water setting that you can.    For more tips on managing your health at home, go to www.cdc.gov/coronavirus/2019-ncov/downloads/10Things.pdf.  How can I take care of myself at " home?  1. Get lots of rest. Drink extra fluids (unless a doctor has told you not to).  2. Take Tylenol (acetaminophen) for fever or pain. If you have liver or kidney problems, ask your family doctor if it's okay to take Tylenol.   Adults can take either:   ? 650 mg (two 325 mg pills) every 4 to 6 hours, or   ? 1,000 mg (two 500 mg pills) every 8 hours as needed.  ? Note: Don't take more than 3,000 mg in one day. Acetaminophen is found in many medicines (both prescribed and over-the-counter medicines). Read all labels to be sure you don't take too much.   For children, check the Tylenol bottle for the right dose. The dose is based on the child's age or weight.  3. If you have other health problems (like cancer, heart failure, an organ transplant or severe kidney disease): Call your specialty clinic if you don't feel better in the next 2 days.  4. Know when to call 911. Emergency warning signs include:  ? Trouble breathing or shortness of breath  ? Pain or pressure in the chest that doesn't go away  ? Feeling confused like you haven't felt before, or not being able to wake up  ? Bluish-colored lips or face  5. Your doctor may have prescribed a blood thinner medicine. Follow their instructions.  Where can I get more information?    [facility] - About COVID-19:   [email or web address]    CDC - What to Do If You're Sick: www.cdc.gov/coronavirus/2019-ncov/about/steps-when-sick.html    CDC - Ending Home Isolation: www.cdc.gov/coronavirus/2019-ncov/hcp/disposition-in-home-patients.html    CDC - Caring for Someone: www.cdc.gov/coronavirus/2019-ncov/if-you-are-sick/care-for-someone.html    [facility] - Interim Guidance for Hospital Discharge to Home: www.health.[facility].[place]/diseases/coronavirus/hcp/hospdischarge.pdf    Below are the COVID-19 hotlines at the [place] Department of Health ([facility]). Interpreters are available.  ? For health questions: Call 432-660-8623 or 1-857.200.3391 (7 a.m. to 7 p.m.)  ? For  questions about schools and childcare: Call 984-474-0838 or 1-670.774.9058 (7 a.m. to 7 p.m.)    For informational purposes only. Not to replace the advice of your health care provider. Clinically reviewed by Dr. Henok Cortés.   Copyright   2020 [facility]. All rights reserved. [employer] 514114 - REV 01/05/21.

## 2021-11-02 NOTE — PROVIDER NOTIFICATION
11/02/21 1409   Vitals   Resp 25   SpO2 98 %   Oximeter Heart Rate 72 bpm   Pt lied on right side with hob elevated, pt has complaint of heart burn, RR 25.

## 2021-11-02 NOTE — PROVIDER NOTIFICATION
11/02/21 1405   Vitals   Resp (!) 32   SpO2 98 %   Oximeter Heart Rate 74 bpm   Pt states that breathing doesn't feel fast anymore. RR 32/min.

## 2021-11-02 NOTE — PROVIDER NOTIFICATION
11/02/21 0951   Provider Notification   Provider Name/Title Dr Miguel Ángel Antunez   Method of Notification At Bedside   Notification Reason Status Update   MD bedside, aware that pt's pain level is decreased to 4/10 in LLQ, pt denies any covid symptoms, MD reviewed FHTs and gave VORB that pt no longer needs fetal monitoring, give 1L LR bolus and have pt eat breakfast and lunch and update MD this afternoon on status.

## 2021-11-03 ENCOUNTER — VIRTUAL VISIT (OUTPATIENT)
Dept: OBGYN | Facility: CLINIC | Age: 31
End: 2021-11-03
Payer: COMMERCIAL

## 2021-11-03 DIAGNOSIS — Z34.80 SUPERVISION OF OTHER NORMAL PREGNANCY, ANTEPARTUM: ICD-10-CM

## 2021-11-03 DIAGNOSIS — U07.1 INFECTION DUE TO 2019 NOVEL CORONAVIRUS: Primary | ICD-10-CM

## 2021-11-03 LAB — BACTERIA UR CULT: NORMAL

## 2021-11-03 PROCEDURE — 99207 PR PRENATAL VISIT: CPT | Mod: 95 | Performed by: OBSTETRICS & GYNECOLOGY

## 2021-11-03 NOTE — PROGRESS NOTES
Second phone attempt to reach pt on home virtual monitoring program for COVID-19, no answer. Left message asking pt to participate in [employer]. Pt had virtual visit today.

## 2021-11-19 ENCOUNTER — PRENATAL OFFICE VISIT (OUTPATIENT)
Dept: OBGYN | Facility: CLINIC | Age: 31
End: 2021-11-19
Payer: COMMERCIAL

## 2021-11-19 VITALS — BODY MASS INDEX: 30.22 KG/M2 | WEIGHT: 165.2 LBS | DIASTOLIC BLOOD PRESSURE: 60 MMHG | SYSTOLIC BLOOD PRESSURE: 106 MMHG

## 2021-11-19 DIAGNOSIS — Z34.80 SUPERVISION OF OTHER NORMAL PREGNANCY, ANTEPARTUM: Primary | ICD-10-CM

## 2021-11-19 DIAGNOSIS — Z23 NEED FOR TDAP VACCINATION: ICD-10-CM

## 2021-11-19 PROCEDURE — 99207 PR PRENATAL VISIT: CPT | Performed by: ADVANCED PRACTICE MIDWIFE

## 2021-11-19 NOTE — NURSING NOTE
"Chief Complaint   Patient presents with     Prenatal Care       Initial /60 (BP Location: Right arm, Cuff Size: Adult Regular)   Wt 74.9 kg (165 lb 3.2 oz)   LMP 2021 (Exact Date)   Breastfeeding No   BMI 30.22 kg/m   Estimated body mass index is 30.22 kg/m  as calculated from the following:    Height as of 10/25/21: 1.575 m (5' 2\").    Weight as of this encounter: 74.9 kg (165 lb 3.2 oz).  BP completed using cuff size: regular    Questioned patient about current smoking habits.  Pt. has never smoked.        "

## 2021-11-19 NOTE — PROGRESS NOTES
S: Feels well and has no concerns.  Baby active.  Denies uterine cramping, vaginal bleeding or leaking of fluid  O: Vitals: /60 (BP Location: Right arm, Cuff Size: Adult Regular)   Wt 74.9 kg (165 lb 3.2 oz)   LMP 04/25/2021 (Exact Date)   Breastfeeding No   BMI 30.22 kg/m    BMI= Body mass index is 30.22 kg/m .  Exam:  Constitutional: healthy, alert and no distress  Respiratory: respirations even and unlabored  Gastrointestinal: Abdomen soft, non-tender. Fundus measures appropriate for gestational age. Fetal heart tones hear without difficulty and within normal limits  : Deferred  Psychiatric: mentation appears normal and affect normal/bright  A:     ICD-10-CM    1. Supervision of other normal pregnancy, antepartum  Z34.80    2. Need for Tdap vaccination  Z23      P: Discussed plans for labor. Taking OTC Fe supplement. Advised to continue for remainder of pregnancy  Encouraged patient to call with any questions or concerns.  Return to clinic 2 weeks    CYRUS Arita, SERGIO

## 2021-12-02 ENCOUNTER — PRENATAL OFFICE VISIT (OUTPATIENT)
Dept: OBGYN | Facility: CLINIC | Age: 31
End: 2021-12-02
Payer: COMMERCIAL

## 2021-12-02 VITALS
DIASTOLIC BLOOD PRESSURE: 60 MMHG | BODY MASS INDEX: 29.96 KG/M2 | HEART RATE: 88 BPM | WEIGHT: 163.8 LBS | SYSTOLIC BLOOD PRESSURE: 104 MMHG

## 2021-12-02 DIAGNOSIS — Z34.83 ENCOUNTER FOR SUPERVISION OF OTHER NORMAL PREGNANCY IN THIRD TRIMESTER: Primary | ICD-10-CM

## 2021-12-02 PROCEDURE — 99207 PR PRENATAL VISIT: CPT | Performed by: OBSTETRICS & GYNECOLOGY

## 2021-12-02 NOTE — PROGRESS NOTES
Doing well.   She states her pain that led to her hospitalization resolved shortly after discharge from the hospital; it has since not recurred.   Denies VB, ctx, LOF. +FM  /60 (BP Location: Left arm, Cuff Size: Adult Regular)   Pulse 88   Wt 74.3 kg (163 lb 12.8 oz)   LMP 2021 (Exact Date)   Breastfeeding No   BMI 29.96 kg/m    General Appearance: NAD  Abdomen: Gravid, NT  Refer to flow sheet above.   A/P: 31 year old  at 31w4d  -- growth ultrasound ordered for size less than dates  -- PTL precautions reviewed  RTC in  2 weeks    Tanisha Oviedo MD  [facility]

## 2021-12-02 NOTE — NURSING NOTE
"Chief Complaint   Patient presents with     Prenatal Care     31 weeks  4 days         Initial /60 (BP Location: Left arm, Cuff Size: Adult Regular)   Pulse 88   Wt 74.3 kg (163 lb 12.8 oz)   LMP 2021 (Exact Date)   Breastfeeding No   BMI 29.96 kg/m   Estimated body mass index is 29.96 kg/m  as calculated from the following:    Height as of 10/25/21: 1.575 m (5' 2\").    Weight as of this encounter: 74.3 kg (163 lb 12.8 oz).  BP completed using cuff size: regular    Questioned patient about current smoking habits.  Pt. has never smoked.    31w4d      The following HM Due: NONE        +FM daily         Margaret Arceo, CMA on 2021 at 9:59 AM       "

## 2021-12-13 ENCOUNTER — ANCILLARY PROCEDURE (OUTPATIENT)
Dept: ULTRASOUND IMAGING | Facility: CLINIC | Age: 31
End: 2021-12-13
Attending: OBSTETRICS & GYNECOLOGY
Payer: COMMERCIAL

## 2021-12-13 DIAGNOSIS — Z34.83 ENCOUNTER FOR SUPERVISION OF OTHER NORMAL PREGNANCY IN THIRD TRIMESTER: ICD-10-CM

## 2021-12-13 PROCEDURE — 76816 OB US FOLLOW-UP PER FETUS: CPT | Performed by: OBSTETRICS & GYNECOLOGY

## 2021-12-17 ENCOUNTER — PRENATAL OFFICE VISIT (OUTPATIENT)
Dept: OBGYN | Facility: CLINIC | Age: 31
End: 2021-12-17
Payer: COMMERCIAL

## 2021-12-17 VITALS
HEART RATE: 79 BPM | WEIGHT: 168.5 LBS | SYSTOLIC BLOOD PRESSURE: 112 MMHG | DIASTOLIC BLOOD PRESSURE: 74 MMHG | BODY MASS INDEX: 30.82 KG/M2

## 2021-12-17 DIAGNOSIS — Z34.83 ENCOUNTER FOR SUPERVISION OF OTHER NORMAL PREGNANCY IN THIRD TRIMESTER: Primary | ICD-10-CM

## 2021-12-17 PROCEDURE — 99207 PR PRENATAL VISIT: CPT | Performed by: OBSTETRICS & GYNECOLOGY

## 2021-12-17 NOTE — NURSING NOTE
"Chief Complaint   Patient presents with     Prenatal Care     33 weks 5 days        Initial /74 (BP Location: Left arm, Cuff Size: Adult Regular)   Pulse 79   Wt 76.4 kg (168 lb 8 oz)   LMP 2021 (Exact Date)   Breastfeeding No   BMI 30.82 kg/m   Estimated body mass index is 30.82 kg/m  as calculated from the following:    Height as of 10/25/21: 1.575 m (5' 2\").    Weight as of this encounter: 76.4 kg (168 lb 8 oz).  BP completed using cuff size: regular    Questioned patient about current smoking habits.  Pt. has never smoked.    33w5d      The following HM Due: NONE    +FM daily   +Heartburn         Margaret Arceo, CMA on 2021 at 9:55 AM           "

## 2021-12-17 NOTE — PROGRESS NOTES
Doing well.   No complaints.   Denies VB, ctx, LOF. +FM  /74 (BP Location: Left arm, Cuff Size: Adult Regular)   Pulse 79   Wt 76.4 kg (168 lb 8 oz)   LMP 2021 (Exact Date)   Breastfeeding No   BMI 30.82 kg/m    General Appearance: NAD  Abdomen: Gravid, NT  Refer to flow sheet above.   A/P: 31 year old  at 33w5d  -- reviewed normal growth ultrasound  -- PTL precautions reviewed  RTC in 2 weeks    Tanisha Oviedo MD  [facility]

## 2021-12-30 ENCOUNTER — PRENATAL OFFICE VISIT (OUTPATIENT)
Dept: OBGYN | Facility: CLINIC | Age: 31
End: 2021-12-30
Payer: COMMERCIAL

## 2021-12-30 VITALS
SYSTOLIC BLOOD PRESSURE: 100 MMHG | DIASTOLIC BLOOD PRESSURE: 60 MMHG | HEIGHT: 62 IN | BODY MASS INDEX: 31.27 KG/M2 | WEIGHT: 169.9 LBS

## 2021-12-30 DIAGNOSIS — K21.00 GASTROESOPHAGEAL REFLUX DISEASE WITH ESOPHAGITIS WITHOUT HEMORRHAGE: Primary | ICD-10-CM

## 2021-12-30 PROCEDURE — 99207 PR PRENATAL VISIT: CPT | Performed by: FAMILY MEDICINE

## 2021-12-30 RX ORDER — OMEPRAZOLE 40 MG/1
40 CAPSULE, DELAYED RELEASE ORAL DAILY
Qty: 90 CAPSULE | Refills: 3 | Status: SHIPPED | OUTPATIENT
Start: 2021-12-30 | End: 2022-12-07

## 2021-12-30 ASSESSMENT — MIFFLIN-ST. JEOR: SCORE: 1428.91

## 2021-12-30 NOTE — PATIENT INSTRUCTIONS
"Return weekly   Return to clinic:  every 4 weeks till 28 weeks, then every 2 weeks till 36 weeks, then weekly till delivery      Phone numbers [place]:  Day/ night [phone] ask for ob triage  Emergency:  Call labor and delivery:  [phone]    What should I call about??    Contraction every 5 minutes for 1 hour 1 minute long (511), bleeding, loss of fluid, headache that doesn't resolve with tylenol, and decreased fetal movement     Start kick counts @ 26-28 weeks   There is an lucy for this!  It is called \"count the kicks\"  Keep track of movement and discover your normal baby movement pattern   guideline is listed below  Please call if you do not feel the baby move!  We will have you come in for fetal heart rate monitoring:   Perception of at least 10 FMs during 12 hours of normal maternal activity   Perception of least 10 FMs over two hours when the mother is at rest and focused on [facility] Address   [address], [place], [place] [address] [phone]    Dr. Khushbu Avalos, DO    OB/GYN   [facility]                                                      "

## 2021-12-30 NOTE — PROGRESS NOTES
"CC: Here for routine prenatal visit   31 year old y/o  @ 35w4d with Estimated Date of Delivery: 2022     /60   Ht 1.575 m (5' 2\")   Wt 77.1 kg (169 lb 14.4 oz)   LMP 2021 (Exact Date)   BMI 31.08 kg/m    See OB flowsheet  + fetal movement, no contractions, no bleeding, no loss of fluid   Discussed monitoring fetal movement     1) concerns: GERD, rx omeprazole called in .   Covid-19 concerns: covid infection and vaccination recommendations discussed.   2) Routine: Blood type A+ RI tdap [x] flu [x] GS [x] NIPT [x] AFP [not done[  Covid v [not done, s/p infection @ 26 weeks] gtt [x ]  3) Risk factors:   A: Covid infection @ 26 weeks: fetal growth normal 2021    4) Return: weekly     Tillyenys     "

## 2021-12-30 NOTE — NURSING NOTE
"35w4d    Chief Complaint   Patient presents with     Prenatal Care     pt having heartburn       Initial /60   Ht 1.575 m (5' 2\")   Wt 77.1 kg (169 lb 14.4 oz)   LMP 2021 (Exact Date)   BMI 31.08 kg/m   Estimated body mass index is 31.08 kg/m  as calculated from the following:    Height as of this encounter: 1.575 m (5' 2\").    Weight as of this encounter: 77.1 kg (169 lb 14.4 oz).  BP completed using cuff size: regular    Questioned patient about current smoking habits.  Pt. has never smoked.          The following HM Due: NONE         "

## 2022-01-06 ENCOUNTER — PRENATAL OFFICE VISIT (OUTPATIENT)
Dept: OBGYN | Facility: CLINIC | Age: 32
End: 2022-01-06
Payer: COMMERCIAL

## 2022-01-06 VITALS — SYSTOLIC BLOOD PRESSURE: 122 MMHG | BODY MASS INDEX: 31.86 KG/M2 | DIASTOLIC BLOOD PRESSURE: 84 MMHG | WEIGHT: 174.2 LBS

## 2022-01-06 DIAGNOSIS — Z36.85 SCREENING, ANTENATAL, FOR STREPTOCOCCUS B: Primary | ICD-10-CM

## 2022-01-06 PROCEDURE — 87653 STREP B DNA AMP PROBE: CPT | Performed by: FAMILY MEDICINE

## 2022-01-06 PROCEDURE — 99207 PR PRENATAL VISIT: CPT | Performed by: FAMILY MEDICINE

## 2022-01-06 NOTE — PROGRESS NOTES
CC: Here for routine prenatal visit   31 year old y/o  @ 36w4d with Estimated Date of Delivery: 2022     /84 (BP Location: Right arm, Cuff Size: Adult Regular)   Wt 79 kg (174 lb 3.2 oz)   LMP 2021 (Exact Date)   Breastfeeding No   BMI 31.86 kg/m     See OB flowsheet  + fetal movement, no contractions, no bleeding, no loss of fluid   Discussed monitoring fetal movement baby moving a lot     1) concerns: wonders if baby moving too much, moves every 2-3 hours.   Covid-19 concerns: covid infection and vaccination recommendations discussed.   2) Routine: Blood type A+ RI tdap [x] flu [x] GS [x] NIPT [x] AFP [not done[  Covid v [not done, s/p infection @ 26 weeks] gtt [x ]  3) Risk factors:   A: Covid infection @ 26 weeks: fetal growth normal 2021      4) Return: weekly     [place]

## 2022-01-06 NOTE — NURSING NOTE
"Chief Complaint   Patient presents with     Prenatal Care     36 weeks 4 days  GBS due today        Initial /84 (BP Location: Right arm, Cuff Size: Adult Regular)   Wt 79 kg (174 lb 3.2 oz)   LMP 2021 (Exact Date)   Breastfeeding No   BMI 31.86 kg/m   Estimated body mass index is 31.86 kg/m  as calculated from the following:    Height as of 21: 1.575 m (5' 2\").    Weight as of this encounter: 79 kg (174 lb 3.2 oz).  BP completed using cuff size: regular    Questioned patient about current smoking habits.  Pt. has never smoked.    36w4d      The following HM Due: NONE      +FM daily   +Fatigued   +GBS today       Margaret Arceo, CMA on 2022 at 11:00 AM       "

## 2022-01-07 LAB
GP B STREP DNA SPEC QL NAA+PROBE: NEGATIVE
PATIENT PENICILLIN, AMOXICILLIN, CEPHALOSPORINS ALLERGY: NO

## 2022-01-10 ENCOUNTER — TELEPHONE (OUTPATIENT)
Dept: OBGYN | Facility: CLINIC | Age: 32
End: 2022-01-10
Payer: COMMERCIAL

## 2022-01-11 NOTE — TELEPHONE ENCOUNTER
Dr. Avalos, please see the message below.   Do you know what infusion the pt would need?  Is see there was an order in there for IV hydration, but that was from 8/2021.  Last OV with you was on 1/6/22.  37w2d  Sandra CUELLAR RN

## 2022-01-11 NOTE — TELEPHONE ENCOUNTER
Can you ask why the patient would like this?    She didn't mention to me in the office. Then I can put orders in  Dr. Khushbu Avalos,     Obstetrics and Gynecology  [facility] and [place]

## 2022-01-11 NOTE — TELEPHONE ENCOUNTER
Dr. Avalos, the pt wasn't aware of needing infusions and was wondering why she got a call?    So just to verify, no infusions are needed?    Sandra CUELLAR RN

## 2022-01-11 NOTE — TELEPHONE ENCOUNTER
See below, her  called inquiring about getting set up for an infusion.  So I was following up on why she needed it.  No further action needed.   Dr. Khushbu Avalos, DO    Obstetrics and Gynecology  [facility] and [place]

## 2022-01-11 NOTE — TELEPHONE ENCOUNTER
"Spouse calling, states someone from the clinic called today to schedule an \"infusion appointment\".  They are unsure why pt needs an infusion.     RN reviewed pt chart, unable to find documentation of this call today.  RN will route to OB clinic.     Yvette Gill RN  [facility] Nurse Advisor    "

## 2022-01-13 ENCOUNTER — PRENATAL OFFICE VISIT (OUTPATIENT)
Dept: OBGYN | Facility: CLINIC | Age: 32
End: 2022-01-13
Payer: COMMERCIAL

## 2022-01-13 ENCOUNTER — HOSPITAL ENCOUNTER (OUTPATIENT)
Facility: CLINIC | Age: 32
Discharge: HOME | End: 2022-01-13
Attending: OBSTETRICS & GYNECOLOGY | Admitting: OBSTETRICS & GYNECOLOGY
Payer: COMMERCIAL

## 2022-01-13 ENCOUNTER — NURSE TRIAGE (OUTPATIENT)
Dept: NURSING | Facility: CLINIC | Age: 32
End: 2022-01-13

## 2022-01-13 VITALS
HEART RATE: 67 BPM | RESPIRATION RATE: 16 BRPM | SYSTOLIC BLOOD PRESSURE: 138 MMHG | TEMPERATURE: 98.5 F | DIASTOLIC BLOOD PRESSURE: 81 MMHG | HEIGHT: 62 IN | WEIGHT: 176 LBS | BODY MASS INDEX: 32.39 KG/M2 | OXYGEN SATURATION: 99 %

## 2022-01-13 VITALS
SYSTOLIC BLOOD PRESSURE: 134 MMHG | DIASTOLIC BLOOD PRESSURE: 86 MMHG | WEIGHT: 175.9 LBS | BODY MASS INDEX: 32.17 KG/M2 | HEART RATE: 78 BPM

## 2022-01-13 DIAGNOSIS — Z36.89 ENCOUNTER FOR TRIAGE IN PREGNANT PATIENT: Primary | ICD-10-CM

## 2022-01-13 DIAGNOSIS — Z34.80 SUPERVISION OF OTHER NORMAL PREGNANCY, ANTEPARTUM: Primary | ICD-10-CM

## 2022-01-13 LAB
CLUE CELLS: PRESENT
TRICHOMONAS, WET PREP: ABNORMAL
WBC'S/HIGH POWER FIELD, WET PREP: ABNORMAL
YEAST, WET PREP: ABNORMAL

## 2022-01-13 PROCEDURE — 99207 PR PRENATAL VISIT: CPT | Performed by: OBSTETRICS & GYNECOLOGY

## 2022-01-13 PROCEDURE — G0463 HOSPITAL OUTPT CLINIC VISIT: HCPCS

## 2022-01-13 PROCEDURE — 87210 SMEAR WET MOUNT SALINE/INK: CPT | Performed by: OBSTETRICS & GYNECOLOGY

## 2022-01-13 RX ORDER — METRONIDAZOLE 500 MG/1
500 TABLET ORAL 2 TIMES DAILY
Qty: 14 TABLET | Refills: 0 | Status: SHIPPED | OUTPATIENT
Start: 2022-01-13 | End: 2022-01-20

## 2022-01-13 RX ORDER — METRONIDAZOLE 500 MG/1
500 TABLET ORAL 2 TIMES DAILY
Status: DISCONTINUED | OUTPATIENT
Start: 2022-01-13 | End: 2022-01-13

## 2022-01-13 ASSESSMENT — MIFFLIN-ST. JEOR: SCORE: 1456.58

## 2022-01-13 NOTE — NURSING NOTE
".  Chief Complaint   Patient presents with     Prenatal Care     37 weeks 4 days   GBS=NEG       Initial /86 (BP Location: Left arm, Cuff Size: Adult Regular)   Pulse 78   Wt 79.8 kg (175 lb 14.4 oz)   LMP 2021 (Exact Date)   Breastfeeding No   BMI 32.17 kg/m   Estimated body mass index is 32.17 kg/m  as calculated from the following:    Height as of 21: 1.575 m (5' 2\").    Weight as of this encounter: 79.8 kg (175 lb 14.4 oz).  BP completed using cuff size: regular    Questioned patient about current smoking habits.  Pt. has never smoked.    37w4d      The following HM Due: NONE    +FM daily  +Feels heart palpatitions in the am        Margaret Arceo, JAJA on 2022 at 9:28 AM               "

## 2022-01-13 NOTE — PROGRESS NOTES
Doing well.   No complaints.   Denies VB, ctx, LOF. +FM  /86 (BP Location: Left arm, Cuff Size: Adult Regular)   Pulse 78   Wt 79.8 kg (175 lb 14.4 oz)   LMP 2021 (Exact Date)   Breastfeeding No   BMI 32.17 kg/m    General Appearance: NAD  Abdomen: Gravid, NT  Refer to flow sheet above.    A/P: 31 year old  at 37w4d  -- reviewed GBS negative status  -- labor precautions reviewed  RTC in 1 week    Tanisha Oviedo MD  [facility]

## 2022-01-14 NOTE — PROVIDER NOTIFICATION
Dr. He on site.  Reviewed patient s/s, category I tracing, patient is lena and they palpate as mild, patient aware but denies pain.  Patient states earlier, at home, contractions were more intense and painful. Advised of SVE findings.  Re patient report of chest palpitations and 'sparkly eyes' that started today - /81 tonight, she was 134/86 in clinic today.  O2 99% on room air, LS clear to bases, HRR, normal reflexes negative clonus.  No edema present.  Normal cap refill.  Patient does take OTC for heartburn.  Lastly, patient does report increased urinary frequency but denies blood in urine, painful urination, difficulty starting or stopping her stream.    Per MD:  No need to send urine.  Recheck SVE at 2300 if little to no change in findings ok to discharge home.  If ongoing concerns regarding chest pain patient to be seen in ED for evaluation.  Clearly obstetrically to discharge home per MD.  Jessica Samayoa RN on 1/13/2022 at 10:44 PM

## 2022-01-14 NOTE — PROVIDER NOTIFICATION
Dr. He on site, advised of expected triage patient.  Will be coming in for r/o labor.  Plan of care:  SVE upon arrival, fetal/uterine monitoring/ repeat SVE one hour later.  Jessica Samayoa RN on 1/13/2022 at 9:44 PM

## 2022-01-14 NOTE — TELEPHONE ENCOUNTER
OB Triage Call      Is patient's OB/Midwife with the [facility] or [facility]? [facility]- Proceed with triage     Reason for call: contractions    Assessment: 37w4d , [facility] OB, pt reports contractions 10 min apart for past 30 minutes. Denies gush or leakage of vaginal fluid.Denies vaginal bleeding. Reports baby moving well tonight as usual.     Plan: Home Care.  Call back when contractions have been q 10 min or less apart for 1 full hour - so we can let L&D know you are coming.     Patient plans to deliver at [facility]     Patient's primary OB Provider is Dr. Oviedo.      Per protocol recommendations Patient to follow home care recommendations. An FYI page or consult is not needed unless patient is requesting to speak to midwife or MD, they are in labor, or it is recommended by triage protocol    Is patient's delivering hospital on divert? No, not as of this time (8:22pm)       37w4d    Estimated Date of Delivery: 2022        OB History    Para Term  AB Living   4 2 2 0 1 2   SAB IAB Ectopic Multiple Live Births   1 0 0 0 2      # Outcome Date GA Lbr Christiano/2nd Weight Sex Delivery Anes PTL Lv   4 Current            3 Term 10/11/18 38w4d 08:05 / 00:33 3.79 kg (8 lb 5.7 oz) M Vag-Spont EPI N FERNANDO      Name: LUIS MANUEL PALM      Apgar1: 9  Apgar5: 9   2 Term / 39w3d 05:01 / 00:32 3.155 kg (6 lb 15.3 oz) M Vag-Spont EPI N FERNANDO      Name: NERY WAGNER      Apgar1: 9  Apgar5: 9   1 SAB 08/13/15               Lab Results   Component Value Date    GBS Negative 2018          Munira Lopez RN 22 8:17 PM  [facility] Nurse Advisor    Reason for Disposition    [1] History of prior delivery (multipara) AND [2] contractions > 10 minutes, or for < 1 hour    Additional Information    Negative: Passed out (i.e., lost consciousness, collapsed and was not responding)    Negative: Shock suspected (e.g., cold/pale/clammy skin, too weak to stand, low BP, rapid  "pulse)    Negative: Difficult to awaken or acting confused (e.g., disoriented, slurred speech)    Negative: [1] SEVERE abdominal pain (e.g., excruciating) AND [2] constant AND [3] present > 1 hour    Negative: Severe bleeding (e.g., continuous red blood from vagina, or large blood clots)    Negative: Umbilical cord hanging out of the vagina (shiny, white, curled appearance, \"like telephone cord\")    Negative: Uncontrollable urge to push (i.e., feels like baby is coming out now)    Negative: Can see baby    Negative: Sounds like a life-threatening emergency to the triager    Negative: Pregnant < 37 weeks (i.e., )    Negative: [1] Uncertain delivery date AND [2] possibly pregnant < 37 weeks (i.e., )    Negative: [1] First baby (primipara) AND [2] contractions < 6 minutes apart  AND [3] present 2 hours    Negative: [1] History of prior delivery (multipara) AND [2] contractions < 10 minutes apart AND [3] present 1 hour    Negative: [1] History of rapid prior delivery AND [2] contractions < 10 minutes apart    Negative: [1] Leakage of fluid from vagina AND [2] green or brown in color    Negative: [1] Leakage of fluid from vagina AND [2] leakage started > 4 hours ago    Negative: Vaginal bleeding or spotting    Negative: Baby moving less today (e.g., kick count < 5 in 1 hour or < 10 in 2 hours)    Negative: Severe headache or headache that won't go away    Negative: New blurred vision or vision changes    Negative: MODERATE-SEVERE abdominal pain    Negative: [1] MILD abdominal pain (e.g., doesn't interfere with normal activities) AND [2] constant AND [3] present > 2 hours    Negative: Fever > 100.4 F (38.0 C)    Negative: [1] Leakage of fluid from vagina AND [2] leakage started < 4 hours ago    Negative: Patient sounds very sick or weak to the triager    Negative: [1] First baby (primipara) AND [2] contractions > 5 minutes apart, or for < 2 hours    Protocols used: PREGNANCY - LABOR-A-      "

## 2022-01-14 NOTE — PROVIDER NOTIFICATION
01/13/22 9206   Provider Notification   Provider Name/Title dr mauricio   Method of Notification In Department   Request Evaluate - Remote   Notification Reason Other (Comment)       Dr Mauricio advised that patient has been up to bathroom and when she returned a questionable odor noted comparable to BV.  Per MD - send wetprep.  Ordered, placed, to collect.

## 2022-01-14 NOTE — PLAN OF CARE
Data: Patient presented to Birthplace: 2022  9:40 PM.  Reason for maternal/fetal assessment is uterine contractions, elevated blood pressure at home. Patient reports taking her blood pressure at home with a finding of 143/100.  She also reports sparkly vision and chest palpitations that started today.  She denies HA, RUQ pain, edema.  LS clear heart rate RRR, O2 at 99% on room air, normal cap refill, normal reflexes negative clonus.    She also reports feeling mildly painful contractions at home  that started at 1900.  Patient is lena here, however, she states that although she can feel some of them they are no longer painful.  Patient is a .  Prenatal record reviewed. Pregnancy has been uncomplicated..  Gestational Age 37w4d. VSS. Fetal movement active. Patient denies leaking of vaginal fluid/rupture of membranes, vaginal bleeding, abdominal pain, pelvic pressure, nausea, vomiting, headache, epigastric or URQ pain, significant edema. Support person is present.   Action: Verbal consent for SVE and EFM. Triage assessment completed. Bill of rights reviewed.  Response: Patient verbalized agreement with plan. Will contact Dr Dalia He with update and for further orders.

## 2022-01-14 NOTE — DISCHARGE INSTRUCTIONS
Data: Patient assessed in the Birthplace for uterine contractions, chest palpitations.  Cervical exam x 2.  Membranes intact.  Contractions/uterine assessment performed.  Action:  Presumed adequate fetal oxygenation documented (see flow record). Discharge instructions reviewed.  Patient instructed to report change in fetal movement, vaginal leaking of fluid or bleeding, abdominal pain, or any concerns related to the pregnancy to her nurse/physician.    Response: Orders to discharge home per Dalia He.  Patient verbalized understanding of education and verbalized agreement with plan. Discharged with directives: start treatment for bacterial vaginosis tomorrow.   If ongoing concerns of chest pain please be seen for evaluation in emergency room.

## 2022-01-14 NOTE — PLAN OF CARE
Resulted wetprep = + BV.  Per VORB from Dr He flagyl 500mg po BID x 7 days ordered.  Pharmacy concirmed with patient.  Patient education re dx and treatment done.  Prepping patient for discharge.  Jessica Samayoa RN on 1/13/2022 at 11:19 PM

## 2022-01-14 NOTE — PLAN OF CARE
Data: Patient assessed in the Birthplace for uterine contractions, chest flutters.  Cervical exam x 2 with no change from clinic assessment  Membranes intact.  Contractions/uterine assessment done.  Contractions present, palpate as mild, non painful for patient, not changing cervix.  Action:  Presumed adequate fetal oxygenation documented (see flow record).   Wet prep performed, +BV, treatment ordered. Home pharmacy confirmed.    Discharge instructions reviewed.  Patient instructed to report change in fetal movement, vaginal leaking of fluid or bleeding, abdominal pain, or any concerns related to the pregnancy to her nurse/physician.  Education regarding BV dx and treatment reviewed in detail.      Discussed chest palpitations and need for ER evaluation - patient states that this is not constant, has gone on for sometime, and is just 'something she notices once in a while'.  She continues to deny chest pain or SOB.  Additional education re seeking treament reviewed.  Reiterated that if she has any concerns at this time she should present to ED from OB for evaluation.    Response: Orders to discharge home per Dalia He.  Patient verbalized understanding of education and verbalized agreement with plan. Discharged at 3091

## 2022-01-14 NOTE — PLAN OF CARE
Dr He aware that wet prep has been sent, verbal orders given and receive and read back.    If wet prep comes back positive for  Yeast:  Order difulcan 150mg po x 1  BV: Order Flagyl 500mg po BID x 7 days  Trichomonas:  Order 2g flagyl po x 1 dose    Await results then ok to discharge patient.

## 2022-01-14 NOTE — TELEPHONE ENCOUNTER
OB Triage Call      Is patient's OB/Midwife with the [facility] or [facility]? [facility]- Proceed with triage     Reason for call: contractions    Assessment: 37w4d , [facility] OB, pt reports contractions less than 10 min apart for past 40 minutes. Denies gush or leakage of vaginal fluid and vaginal bleeding. Reports baby moving      Plan: Have patient go into L & D    Patient plans to deliver at [facility]     Patient's primary OB Provider is Dr. Oviedo      Per protocol recommendations  GO into L & D report called in to Katarzyna DUNLAP.  Is patient's delivering hospital on divert? No      37w4d    Estimated Date of Delivery: 2022        OB History    Para Term  AB Living   4 2 2 0 1 2   SAB IAB Ectopic Multiple Live Births   1 0 0 0 2      # Outcome Date GA Lbr Christiano/2nd Weight Sex Delivery Anes PTL Lv   4 Current            3 Term 10/11/18 38w4d 08:05 / 00:33 3.79 kg (8 lb 5.7 oz) M Vag-Spont EPI N FERNANDO      Name: ARPITA,BABY1 ROFINA      Apgar1: 9  Apgar5: 9   2 Term 16 39w3d 05:01 / 00:32 3.155 kg (6 lb 15.3 oz) M Vag-Spont EPI N FERNANDO      Name: NERY WAGNER      Apgar1: 9  Apgar5: 9   1 SAB 08/13/15               Lab Results   Component Value Date    GBS Negative 2018          Rachel May RN 22 8:51 PM  [facility] Nurse Advisor      Reason for Disposition    [1] History of prior delivery (multipara) AND [2] contractions < 10 minutes apart AND [3] present 1 hour    Additional Information    Negative: Passed out (i.e., lost consciousness, collapsed and was not responding)    Negative: Shock suspected (e.g., cold/pale/clammy skin, too weak to stand, low BP, rapid pulse)    Negative: Difficult to awaken or acting confused (e.g., disoriented, slurred speech)    Negative: [1] SEVERE abdominal pain (e.g., excruciating) AND [2] constant AND [3] present > 1 hour    Negative: Severe bleeding (e.g., continuous red blood from vagina, or large blood  "clots)    Negative: Umbilical cord hanging out of the vagina (shiny, white, curled appearance, \"like telephone cord\")    Negative: Uncontrollable urge to push (i.e., feels like baby is coming out now)    Negative: Can see baby    Negative: Sounds like a life-threatening emergency to the triager    Negative: [1] First baby (primipara) AND [2] contractions < 6 minutes apart  AND [3] present 2 hours    Protocols used: PREGNANCY - LABOR-A-AH      "

## 2022-01-20 ENCOUNTER — PRENATAL OFFICE VISIT (OUTPATIENT)
Dept: OBGYN | Facility: CLINIC | Age: 32
End: 2022-01-20
Payer: COMMERCIAL

## 2022-01-20 VITALS
DIASTOLIC BLOOD PRESSURE: 84 MMHG | HEART RATE: 73 BPM | WEIGHT: 178.3 LBS | SYSTOLIC BLOOD PRESSURE: 138 MMHG | BODY MASS INDEX: 32.61 KG/M2

## 2022-01-20 DIAGNOSIS — Z34.80 SUPERVISION OF OTHER NORMAL PREGNANCY, ANTEPARTUM: Primary | ICD-10-CM

## 2022-01-20 DIAGNOSIS — Z34.90 ENCOUNTER FOR ELECTIVE INDUCTION OF LABOR: ICD-10-CM

## 2022-01-20 PROCEDURE — 99207 PR PRENATAL VISIT: CPT | Performed by: OBSTETRICS & GYNECOLOGY

## 2022-01-20 PROCEDURE — U0003 INFECTIOUS AGENT DETECTION BY NUCLEIC ACID (DNA OR RNA); SEVERE ACUTE RESPIRATORY SYNDROME CORONAVIRUS 2 (SARS-COV-2) (CORONAVIRUS DISEASE [COVID-19]), AMPLIFIED PROBE TECHNIQUE, MAKING USE OF HIGH THROUGHPUT TECHNOLOGIES AS DESCRIBED BY CMS-2020-01-R: HCPCS | Performed by: OBSTETRICS & GYNECOLOGY

## 2022-01-20 PROCEDURE — U0005 INFEC AGEN DETEC AMPLI PROBE: HCPCS | Performed by: OBSTETRICS & GYNECOLOGY

## 2022-01-20 NOTE — NURSING NOTE
"Chief Complaint   Patient presents with     Prenatal Care     38 weeks 4 days   GBS=NEG        Initial /84 (BP Location: Left arm, Cuff Size: Adult Regular)   Pulse 73   Wt 80.9 kg (178 lb 4.8 oz)   LMP 2021 (Exact Date)   Breastfeeding No   BMI 32.61 kg/m   Estimated body mass index is 32.61 kg/m  as calculated from the following:    Height as of 22: 1.575 m (5' 2\").    Weight as of this encounter: 80.9 kg (178 lb 4.8 oz).  BP completed using cuff size: regular    Questioned patient about current smoking habits.  Pt. has never smoked.    38w4d      The following HM Due: NONE        +FM daily   +Contractions         Margaret Arceo, CMA on 2022 at 9:54 AM       "

## 2022-01-20 NOTE — PROGRESS NOTES
Doing well.   Reports off and on contractions, some intense and other not so intense, but she is very uncomfortable overall. Desires elective induction of labor.   Denies VB, LOF. +FM  /84 (BP Location: Left arm, Cuff Size: Adult Regular)   Pulse 73   Wt 80.9 kg (178 lb 4.8 oz)   LMP 2021 (Exact Date)   Breastfeeding No   BMI 32.61 kg/m    General Appearance: NAD  Abdomen: Gravid, NT  Refer to flow sheet above.   A/P: 31 year old  at 38w4d  -- elective induction of labor scheduled for  at 39w gestation; The patient and I discussed that she may need labor induction. You may need medications (either in the vagina or by mouth) to prepare the cervix for labor. Once your cervix is ready for labor, you may need a medication called pitocin. This is a synthetic version of the chemical oxytocin that your brain naturally makes to induce labor. The goal of this medication is to cause regular, painful contractions.  I discussed with the patient the risks, benefits, and alternative approaches; and the possible need for  birth. EFW is AGA.  The Labor Induction: What You Need to Know information sheet was made available to her. Questions and concerns were addressed and patient agrees to above if necessary during the course of her labor.  -- labor precautions reviewed    Tanisha Oviedo MD  [facility]

## 2022-01-21 LAB — SARS-COV-2 RNA RESP QL NAA+PROBE: NEGATIVE

## 2022-01-23 ENCOUNTER — HOSPITAL ENCOUNTER (INPATIENT)
Facility: CLINIC | Age: 32
LOS: 3 days | Discharge: HOME | End: 2022-01-26
Attending: OBSTETRICS & GYNECOLOGY | Admitting: OBSTETRICS & GYNECOLOGY
Payer: COMMERCIAL

## 2022-01-23 ENCOUNTER — NURSE TRIAGE (OUTPATIENT)
Dept: NURSING | Facility: CLINIC | Age: 32
End: 2022-01-23
Payer: COMMERCIAL

## 2022-01-23 ENCOUNTER — ANESTHESIA (OUTPATIENT)
Dept: OBGYN | Facility: CLINIC | Age: 32
End: 2022-01-23
Payer: COMMERCIAL

## 2022-01-23 ENCOUNTER — ANESTHESIA EVENT (OUTPATIENT)
Dept: OBGYN | Facility: CLINIC | Age: 32
End: 2022-01-23
Payer: COMMERCIAL

## 2022-01-23 DIAGNOSIS — R03.0 ELEVATED BLOOD PRESSURE READING WITHOUT DIAGNOSIS OF HYPERTENSION: ICD-10-CM

## 2022-01-23 LAB
ABO/RH(D): NORMAL
ALBUMIN SERPL-MCNC: 2.6 G/DL (ref 3.4–5)
ALP SERPL-CCNC: 139 U/L (ref 40–150)
ALT SERPL W P-5'-P-CCNC: 32 U/L (ref 0–50)
ANION GAP SERPL CALCULATED.3IONS-SCNC: 6 MMOL/L (ref 3–14)
ANTIBODY SCREEN: NEGATIVE
AST SERPL W P-5'-P-CCNC: 19 U/L (ref 0–45)
BASOPHILS # BLD AUTO: 0 10E3/UL (ref 0–0.2)
BASOPHILS NFR BLD AUTO: 0 %
BILIRUB SERPL-MCNC: 0.2 MG/DL (ref 0.2–1.3)
BUN SERPL-MCNC: 15 MG/DL (ref 7–30)
CALCIUM SERPL-MCNC: 9.1 MG/DL (ref 8.5–10.1)
CHLORIDE BLD-SCNC: 108 MMOL/L (ref 94–109)
CO2 SERPL-SCNC: 24 MMOL/L (ref 20–32)
CREAT SERPL-MCNC: 0.88 MG/DL (ref 0.52–1.04)
CREAT UR-MCNC: 373 MG/DL
EOSINOPHIL # BLD AUTO: 0 10E3/UL (ref 0–0.7)
EOSINOPHIL NFR BLD AUTO: 0 %
ERYTHROCYTE [DISTWIDTH] IN BLOOD BY AUTOMATED COUNT: 14.5 % (ref 10–15)
GFR SERPL CREATININE-BSD FRML MDRD: 88 ML/MIN/1.73M2
GLUCOSE BLD-MCNC: 64 MG/DL (ref 70–99)
HCT VFR BLD AUTO: 29.3 % (ref 35–47)
HGB BLD-MCNC: 9.6 G/DL (ref 11.7–15.7)
IMM GRANULOCYTES # BLD: 0.1 10E3/UL
IMM GRANULOCYTES NFR BLD: 1 %
LYMPHOCYTES # BLD AUTO: 1.2 10E3/UL (ref 0.8–5.3)
LYMPHOCYTES NFR BLD AUTO: 18 %
MCH RBC QN AUTO: 30.7 PG (ref 26.5–33)
MCHC RBC AUTO-ENTMCNC: 32.8 G/DL (ref 31.5–36.5)
MCV RBC AUTO: 94 FL (ref 78–100)
MONOCYTES # BLD AUTO: 0.6 10E3/UL (ref 0–1.3)
MONOCYTES NFR BLD AUTO: 8 %
NEUTROPHILS # BLD AUTO: 5 10E3/UL (ref 1.6–8.3)
NEUTROPHILS NFR BLD AUTO: 73 %
NRBC # BLD AUTO: 0 10E3/UL
NRBC BLD AUTO-RTO: 0 /100
PLATELET # BLD AUTO: 185 10E3/UL (ref 150–450)
POTASSIUM BLD-SCNC: 4.1 MMOL/L (ref 3.4–5.3)
PROT SERPL-MCNC: 6.5 G/DL (ref 6.8–8.8)
PROT UR-MCNC: 1.17 G/L
PROT/CREAT 24H UR: 0.31 G/G CR (ref 0–0.2)
RBC # BLD AUTO: 3.13 10E6/UL (ref 3.8–5.2)
SODIUM SERPL-SCNC: 138 MMOL/L (ref 133–144)
SPECIMEN EXPIRATION DATE: NORMAL
WBC # BLD AUTO: 6.9 10E3/UL (ref 4–11)

## 2022-01-23 PROCEDURE — 722N000001 HC LABOR CARE VAGINAL DELIVERY SINGLE

## 2022-01-23 PROCEDURE — 80053 COMPREHEN METABOLIC PANEL: CPT | Performed by: OBSTETRICS & GYNECOLOGY

## 2022-01-23 PROCEDURE — 85025 COMPLETE CBC W/AUTO DIFF WBC: CPT | Performed by: OBSTETRICS & GYNECOLOGY

## 2022-01-23 PROCEDURE — 10907ZC DRAINAGE OF AMNIOTIC FLUID, THERAPEUTIC FROM PRODUCTS OF CONCEPTION, VIA NATURAL OR ARTIFICIAL OPENING: ICD-10-PCS | Performed by: OBSTETRICS & GYNECOLOGY

## 2022-01-23 PROCEDURE — 86901 BLOOD TYPING SEROLOGIC RH(D): CPT | Performed by: OBSTETRICS & GYNECOLOGY

## 2022-01-23 PROCEDURE — 3E033VJ INTRODUCTION OF OTHER HORMONE INTO PERIPHERAL VEIN, PERCUTANEOUS APPROACH: ICD-10-PCS | Performed by: OBSTETRICS & GYNECOLOGY

## 2022-01-23 PROCEDURE — 370N000003 HC ANESTHESIA WARD SERVICE

## 2022-01-23 PROCEDURE — 250N000013 HC RX MED GY IP 250 OP 250 PS 637: Performed by: OBSTETRICS & GYNECOLOGY

## 2022-01-23 PROCEDURE — 250N000011 HC RX IP 250 OP 636: Performed by: ANESTHESIOLOGY

## 2022-01-23 PROCEDURE — 36415 COLL VENOUS BLD VENIPUNCTURE: CPT | Performed by: OBSTETRICS & GYNECOLOGY

## 2022-01-23 PROCEDURE — 250N000011 HC RX IP 250 OP 636: Performed by: OBSTETRICS & GYNECOLOGY

## 2022-01-23 PROCEDURE — 00HU33Z INSERTION OF INFUSION DEVICE INTO SPINAL CANAL, PERCUTANEOUS APPROACH: ICD-10-PCS | Performed by: ANESTHESIOLOGY

## 2022-01-23 PROCEDURE — 84156 ASSAY OF PROTEIN URINE: CPT | Performed by: OBSTETRICS & GYNECOLOGY

## 2022-01-23 PROCEDURE — 258N000003 HC RX IP 258 OP 636: Performed by: OBSTETRICS & GYNECOLOGY

## 2022-01-23 PROCEDURE — 120N000001 HC R&B MED SURG/OB

## 2022-01-23 PROCEDURE — 250N000009 HC RX 250: Performed by: OBSTETRICS & GYNECOLOGY

## 2022-01-23 PROCEDURE — 250N000009 HC RX 250: Performed by: ANESTHESIOLOGY

## 2022-01-23 PROCEDURE — 59400 OBSTETRICAL CARE: CPT | Performed by: OBSTETRICS & GYNECOLOGY

## 2022-01-23 PROCEDURE — 82040 ASSAY OF SERUM ALBUMIN: CPT | Performed by: OBSTETRICS & GYNECOLOGY

## 2022-01-23 PROCEDURE — 86780 TREPONEMA PALLIDUM: CPT | Performed by: OBSTETRICS & GYNECOLOGY

## 2022-01-23 PROCEDURE — 3E0R3BZ INTRODUCTION OF ANESTHETIC AGENT INTO SPINAL CANAL, PERCUTANEOUS APPROACH: ICD-10-PCS | Performed by: ANESTHESIOLOGY

## 2022-01-23 PROCEDURE — 250N000011 HC RX IP 250 OP 636

## 2022-01-23 RX ORDER — OXYTOCIN/0.9 % SODIUM CHLORIDE 30/500 ML
1-24 PLASTIC BAG, INJECTION (ML) INTRAVENOUS CONTINUOUS
Status: DISCONTINUED | OUTPATIENT
Start: 2022-01-23 | End: 2022-01-23 | Stop reason: HOSPADM

## 2022-01-23 RX ORDER — OXYTOCIN 10 [USP'U]/ML
10 INJECTION, SOLUTION INTRAMUSCULAR; INTRAVENOUS
Status: DISCONTINUED | OUTPATIENT
Start: 2022-01-23 | End: 2022-01-23 | Stop reason: HOSPADM

## 2022-01-23 RX ORDER — METHYLERGONOVINE MALEATE 0.2 MG/ML
200 INJECTION INTRAVENOUS
Status: DISCONTINUED | OUTPATIENT
Start: 2022-01-23 | End: 2022-01-26 | Stop reason: HOSPADM

## 2022-01-23 RX ORDER — FENTANYL/BUPIVACAINE/NS/PF 2-1250MCG
PLASTIC BAG, INJECTION (ML) INJECTION
Status: COMPLETED
Start: 2022-01-23 | End: 2022-01-23

## 2022-01-23 RX ORDER — ONDANSETRON 4 MG/1
4 TABLET, ORALLY DISINTEGRATING ORAL EVERY 6 HOURS PRN
Status: DISCONTINUED | OUTPATIENT
Start: 2022-01-23 | End: 2022-01-23 | Stop reason: HOSPADM

## 2022-01-23 RX ORDER — SODIUM CHLORIDE, SODIUM LACTATE, POTASSIUM CHLORIDE, CALCIUM CHLORIDE 600; 310; 30; 20 MG/100ML; MG/100ML; MG/100ML; MG/100ML
INJECTION, SOLUTION INTRAVENOUS CONTINUOUS PRN
Status: DISCONTINUED | OUTPATIENT
Start: 2022-01-23 | End: 2022-01-23 | Stop reason: HOSPADM

## 2022-01-23 RX ORDER — METHYLERGONOVINE MALEATE 0.2 MG/ML
200 INJECTION INTRAVENOUS
Status: DISCONTINUED | OUTPATIENT
Start: 2022-01-23 | End: 2022-01-23 | Stop reason: HOSPADM

## 2022-01-23 RX ORDER — EPHEDRINE SULFATE 50 MG/ML
INJECTION, SOLUTION INTRAMUSCULAR; INTRAVENOUS; SUBCUTANEOUS
Status: DISCONTINUED
Start: 2022-01-23 | End: 2022-01-23 | Stop reason: HOSPADM

## 2022-01-23 RX ORDER — LIDOCAINE HYDROCHLORIDE AND EPINEPHRINE 15; 5 MG/ML; UG/ML
INJECTION, SOLUTION EPIDURAL PRN
Status: DISCONTINUED | OUTPATIENT
Start: 2022-01-23 | End: 2022-01-23

## 2022-01-23 RX ORDER — SODIUM CHLORIDE, SODIUM LACTATE, POTASSIUM CHLORIDE, CALCIUM CHLORIDE 600; 310; 30; 20 MG/100ML; MG/100ML; MG/100ML; MG/100ML
INJECTION, SOLUTION INTRAVENOUS CONTINUOUS
Status: DISCONTINUED | OUTPATIENT
Start: 2022-01-23 | End: 2022-01-23 | Stop reason: HOSPADM

## 2022-01-23 RX ORDER — MISOPROSTOL 200 UG/1
400 TABLET ORAL
Status: DISCONTINUED | OUTPATIENT
Start: 2022-01-23 | End: 2022-01-26 | Stop reason: HOSPADM

## 2022-01-23 RX ORDER — NALOXONE HYDROCHLORIDE 0.4 MG/ML
0.4 INJECTION, SOLUTION INTRAMUSCULAR; INTRAVENOUS; SUBCUTANEOUS
Status: DISCONTINUED | OUTPATIENT
Start: 2022-01-23 | End: 2022-01-23 | Stop reason: HOSPADM

## 2022-01-23 RX ORDER — HYDRALAZINE HYDROCHLORIDE 20 MG/ML
5-10 INJECTION INTRAMUSCULAR; INTRAVENOUS
Status: DISCONTINUED | OUTPATIENT
Start: 2022-01-23 | End: 2022-01-26

## 2022-01-23 RX ORDER — ACETAMINOPHEN 325 MG/1
650 TABLET ORAL EVERY 4 HOURS PRN
Status: DISCONTINUED | OUTPATIENT
Start: 2022-01-23 | End: 2022-01-26 | Stop reason: HOSPADM

## 2022-01-23 RX ORDER — FENTANYL CITRATE-0.9 % NACL/PF 10 MCG/ML
100 PLASTIC BAG, INJECTION (ML) INTRAVENOUS EVERY 5 MIN PRN
Status: DISCONTINUED | OUTPATIENT
Start: 2022-01-23 | End: 2022-01-23 | Stop reason: HOSPADM

## 2022-01-23 RX ORDER — FENTANYL CITRATE 50 UG/ML
50-100 INJECTION, SOLUTION INTRAMUSCULAR; INTRAVENOUS
Status: DISCONTINUED | OUTPATIENT
Start: 2022-01-23 | End: 2022-01-23 | Stop reason: HOSPADM

## 2022-01-23 RX ORDER — MAGNESIUM SULFATE HEPTAHYDRATE 40 MG/ML
2 INJECTION, SOLUTION INTRAVENOUS
Status: DISCONTINUED | OUTPATIENT
Start: 2022-01-23 | End: 2022-01-26

## 2022-01-23 RX ORDER — TRANEXAMIC ACID 10 MG/ML
1 INJECTION, SOLUTION INTRAVENOUS EVERY 30 MIN PRN
Status: DISCONTINUED | OUTPATIENT
Start: 2022-01-23 | End: 2022-01-23 | Stop reason: HOSPADM

## 2022-01-23 RX ORDER — ONDANSETRON 2 MG/ML
4 INJECTION INTRAMUSCULAR; INTRAVENOUS EVERY 6 HOURS PRN
Status: DISCONTINUED | OUTPATIENT
Start: 2022-01-23 | End: 2022-01-23 | Stop reason: HOSPADM

## 2022-01-23 RX ORDER — KETOROLAC TROMETHAMINE 30 MG/ML
30 INJECTION, SOLUTION INTRAMUSCULAR; INTRAVENOUS
Status: DISCONTINUED | OUTPATIENT
Start: 2022-01-23 | End: 2022-01-24

## 2022-01-23 RX ORDER — BISACODYL 10 MG
10 SUPPOSITORY, RECTAL RECTAL DAILY PRN
Status: DISCONTINUED | OUTPATIENT
Start: 2022-01-23 | End: 2022-01-26 | Stop reason: HOSPADM

## 2022-01-23 RX ORDER — LABETALOL HYDROCHLORIDE 5 MG/ML
20-40 INJECTION, SOLUTION INTRAVENOUS EVERY 10 MIN PRN
Status: DISCONTINUED | OUTPATIENT
Start: 2022-01-23 | End: 2022-01-26

## 2022-01-23 RX ORDER — METOCLOPRAMIDE HYDROCHLORIDE 5 MG/ML
10 INJECTION INTRAMUSCULAR; INTRAVENOUS EVERY 6 HOURS PRN
Status: DISCONTINUED | OUTPATIENT
Start: 2022-01-23 | End: 2022-01-23 | Stop reason: HOSPADM

## 2022-01-23 RX ORDER — LORAZEPAM 2 MG/ML
2 INJECTION INTRAMUSCULAR
Status: DISCONTINUED | OUTPATIENT
Start: 2022-01-23 | End: 2022-01-26

## 2022-01-23 RX ORDER — OXYTOCIN 10 [USP'U]/ML
10 INJECTION, SOLUTION INTRAMUSCULAR; INTRAVENOUS
Status: DISCONTINUED | OUTPATIENT
Start: 2022-01-23 | End: 2022-01-24

## 2022-01-23 RX ORDER — CARBOPROST TROMETHAMINE 250 UG/ML
250 INJECTION, SOLUTION INTRAMUSCULAR
Status: DISCONTINUED | OUTPATIENT
Start: 2022-01-23 | End: 2022-01-26 | Stop reason: HOSPADM

## 2022-01-23 RX ORDER — NALOXONE HYDROCHLORIDE 0.4 MG/ML
0.2 INJECTION, SOLUTION INTRAMUSCULAR; INTRAVENOUS; SUBCUTANEOUS
Status: DISCONTINUED | OUTPATIENT
Start: 2022-01-23 | End: 2022-01-23 | Stop reason: HOSPADM

## 2022-01-23 RX ORDER — CARBOPROST TROMETHAMINE 250 UG/ML
250 INJECTION, SOLUTION INTRAMUSCULAR
Status: DISCONTINUED | OUTPATIENT
Start: 2022-01-23 | End: 2022-01-23 | Stop reason: HOSPADM

## 2022-01-23 RX ORDER — TRANEXAMIC ACID 10 MG/ML
1 INJECTION, SOLUTION INTRAVENOUS EVERY 30 MIN PRN
Status: DISCONTINUED | OUTPATIENT
Start: 2022-01-23 | End: 2022-01-26 | Stop reason: HOSPADM

## 2022-01-23 RX ORDER — MISOPROSTOL 200 UG/1
800 TABLET ORAL
Status: DISCONTINUED | OUTPATIENT
Start: 2022-01-23 | End: 2022-01-26 | Stop reason: HOSPADM

## 2022-01-23 RX ORDER — LIDOCAINE 40 MG/G
CREAM TOPICAL
Status: DISCONTINUED | OUTPATIENT
Start: 2022-01-23 | End: 2022-01-23 | Stop reason: HOSPADM

## 2022-01-23 RX ORDER — HYDROCORTISONE 2.5 %
CREAM (GRAM) TOPICAL 3 TIMES DAILY PRN
Status: DISCONTINUED | OUTPATIENT
Start: 2022-01-23 | End: 2022-01-26 | Stop reason: HOSPADM

## 2022-01-23 RX ORDER — OXYTOCIN/0.9 % SODIUM CHLORIDE 30/500 ML
340 PLASTIC BAG, INJECTION (ML) INTRAVENOUS CONTINUOUS PRN
Status: DISCONTINUED | OUTPATIENT
Start: 2022-01-23 | End: 2022-01-23 | Stop reason: HOSPADM

## 2022-01-23 RX ORDER — OXYTOCIN/0.9 % SODIUM CHLORIDE 30/500 ML
340 PLASTIC BAG, INJECTION (ML) INTRAVENOUS CONTINUOUS PRN
Status: DISCONTINUED | OUTPATIENT
Start: 2022-01-23 | End: 2022-01-26 | Stop reason: HOSPADM

## 2022-01-23 RX ORDER — MISOPROSTOL 200 UG/1
800 TABLET ORAL
Status: DISCONTINUED | OUTPATIENT
Start: 2022-01-23 | End: 2022-01-23 | Stop reason: HOSPADM

## 2022-01-23 RX ORDER — MAGNESIUM SULFATE HEPTAHYDRATE 500 MG/ML
10 INJECTION, SOLUTION INTRAMUSCULAR; INTRAVENOUS
Status: DISCONTINUED | OUTPATIENT
Start: 2022-01-23 | End: 2022-01-26

## 2022-01-23 RX ORDER — METOCLOPRAMIDE 10 MG/1
10 TABLET ORAL EVERY 6 HOURS PRN
Status: DISCONTINUED | OUTPATIENT
Start: 2022-01-23 | End: 2022-01-23 | Stop reason: HOSPADM

## 2022-01-23 RX ORDER — IBUPROFEN 600 MG/1
600 TABLET, FILM COATED ORAL
Status: DISCONTINUED | OUTPATIENT
Start: 2022-01-23 | End: 2022-01-24

## 2022-01-23 RX ORDER — BUPIVACAINE HYDROCHLORIDE 2.5 MG/ML
INJECTION, SOLUTION EPIDURAL; INFILTRATION; INTRACAUDAL PRN
Status: DISCONTINUED | OUTPATIENT
Start: 2022-01-23 | End: 2022-01-23

## 2022-01-23 RX ORDER — MAGNESIUM SULFATE HEPTAHYDRATE 40 MG/ML
4 INJECTION, SOLUTION INTRAVENOUS
Status: DISCONTINUED | OUTPATIENT
Start: 2022-01-23 | End: 2022-01-26

## 2022-01-23 RX ORDER — MISOPROSTOL 200 UG/1
400 TABLET ORAL
Status: DISCONTINUED | OUTPATIENT
Start: 2022-01-23 | End: 2022-01-23 | Stop reason: HOSPADM

## 2022-01-23 RX ORDER — HYDRALAZINE HYDROCHLORIDE 20 MG/ML
INJECTION INTRAMUSCULAR; INTRAVENOUS
Status: COMPLETED
Start: 2022-01-23 | End: 2022-01-23

## 2022-01-23 RX ORDER — OXYTOCIN 10 [USP'U]/ML
10 INJECTION, SOLUTION INTRAMUSCULAR; INTRAVENOUS
Status: DISCONTINUED | OUTPATIENT
Start: 2022-01-23 | End: 2022-01-26 | Stop reason: HOSPADM

## 2022-01-23 RX ORDER — NALBUPHINE HYDROCHLORIDE 10 MG/ML
2.5-5 INJECTION, SOLUTION INTRAMUSCULAR; INTRAVENOUS; SUBCUTANEOUS EVERY 6 HOURS PRN
Status: DISCONTINUED | OUTPATIENT
Start: 2022-01-23 | End: 2022-01-26 | Stop reason: HOSPADM

## 2022-01-23 RX ORDER — DOCUSATE SODIUM 100 MG/1
100 CAPSULE, LIQUID FILLED ORAL DAILY
Status: DISCONTINUED | OUTPATIENT
Start: 2022-01-23 | End: 2022-01-26 | Stop reason: HOSPADM

## 2022-01-23 RX ORDER — IBUPROFEN 800 MG/1
800 TABLET, FILM COATED ORAL EVERY 6 HOURS PRN
Status: DISCONTINUED | OUTPATIENT
Start: 2022-01-23 | End: 2022-01-26 | Stop reason: HOSPADM

## 2022-01-23 RX ORDER — OXYTOCIN/0.9 % SODIUM CHLORIDE 30/500 ML
100-340 PLASTIC BAG, INJECTION (ML) INTRAVENOUS CONTINUOUS PRN
Status: DISCONTINUED | OUTPATIENT
Start: 2022-01-23 | End: 2022-01-24

## 2022-01-23 RX ORDER — MODIFIED LANOLIN
OINTMENT (GRAM) TOPICAL
Status: DISCONTINUED | OUTPATIENT
Start: 2022-01-23 | End: 2022-01-26 | Stop reason: HOSPADM

## 2022-01-23 RX ADMIN — IBUPROFEN 600 MG: 600 TABLET, FILM COATED ORAL at 16:45

## 2022-01-23 RX ADMIN — HYDRALAZINE HYDROCHLORIDE 10 MG: 20 INJECTION, SOLUTION INTRAMUSCULAR; INTRAVENOUS at 17:55

## 2022-01-23 RX ADMIN — Medication: at 11:55

## 2022-01-23 RX ADMIN — IBUPROFEN 800 MG: 800 TABLET, FILM COATED ORAL at 22:34

## 2022-01-23 RX ADMIN — HYDRALAZINE HYDROCHLORIDE 5 MG: 20 INJECTION, SOLUTION INTRAMUSCULAR; INTRAVENOUS at 17:25

## 2022-01-23 RX ADMIN — LIDOCAINE HYDROCHLORIDE,EPINEPHRINE BITARTRATE 3 ML: 15; .005 INJECTION, SOLUTION EPIDURAL; INFILTRATION; INTRACAUDAL; PERINEURAL at 11:48

## 2022-01-23 RX ADMIN — SODIUM CHLORIDE, POTASSIUM CHLORIDE, SODIUM LACTATE AND CALCIUM CHLORIDE: 600; 310; 30; 20 INJECTION, SOLUTION INTRAVENOUS at 11:58

## 2022-01-23 RX ADMIN — LIDOCAINE HYDROCHLORIDE,EPINEPHRINE BITARTRATE 2 ML: 15; .005 INJECTION, SOLUTION EPIDURAL; INFILTRATION; INTRACAUDAL; PERINEURAL at 11:51

## 2022-01-23 RX ADMIN — Medication 2 MILLI-UNITS/MIN: at 09:59

## 2022-01-23 RX ADMIN — Medication 2 MILLI-UNITS/MIN: at 14:31

## 2022-01-23 RX ADMIN — Medication 340 ML/HR: at 16:25

## 2022-01-23 RX ADMIN — BUPIVACAINE HYDROCHLORIDE 10 ML: 2.5 INJECTION, SOLUTION EPIDURAL; INFILTRATION; INTRACAUDAL at 11:56

## 2022-01-23 RX ADMIN — SODIUM CHLORIDE, POTASSIUM CHLORIDE, SODIUM LACTATE AND CALCIUM CHLORIDE: 600; 310; 30; 20 INJECTION, SOLUTION INTRAVENOUS at 09:42

## 2022-01-23 ASSESSMENT — ACTIVITIES OF DAILY LIVING (ADL)
ADLS_ACUITY_SCORE: 5
ADLS_ACUITY_SCORE: 7
FALL_HISTORY_WITHIN_LAST_SIX_MONTHS: NO
ADLS_ACUITY_SCORE: 5
ADLS_ACUITY_SCORE: 7
ADLS_ACUITY_SCORE: 5
FALL_HISTORY_WITHIN_LAST_SIX_MONTHS: NO
ADLS_ACUITY_SCORE: 6
ADLS_ACUITY_SCORE: 5
ADLS_ACUITY_SCORE: 5
TOILETING_ISSUES: NO
ADLS_ACUITY_SCORE: 7
ADLS_ACUITY_SCORE: 5

## 2022-01-23 NOTE — TELEPHONE ENCOUNTER
Papi is calling.  39 weeks pregnant and due date is Jan 30th 2022. Third child.    Tanisha Oviedo MD out of  [facility].  Patient was seen on Thursday and was told to go in for an induction.  Patient is requesting to deliver at [facility].  Since yesterday baby is not moving as much.  Baby yesterday has not moved much.    COVID 19 Nurse Triage Plan/Patient Instructions    Please be aware that novel coronavirus (COVID-19) may be circulating in the community. If you develop symptoms such as fever, cough, or SOB or if you have concerns about the presence of another infection including coronavirus (COVID-19), please contact your health care provider or visit [email or web address].     Disposition/Instructions    ED Visit recommended. Follow protocol based instructions.     Bring Your Own Device:  Please also bring your smart device(s) (smart phones, tablets, laptops) and their charging cables for your personal use and to communicate with your care team during your visit.    Thank you for taking steps to prevent the spread of this virus.  o Limit your contact with others.  o Wear a simple mask to cover your cough.  o Wash your hands well and often.    Resources    [facility]: About COVID-19: [email or web address]    CDC: What to Do If You're Sick: www.cdc.gov/coronavirus/2019-ncov/about/steps-when-sick.html    CDC: Ending Home Isolation: www.cdc.gov/coronavirus/2019-ncov/hcp/disposition-in-home-patients.html     CDC: Caring for Someone: www.cdc.gov/coronavirus/2019-ncov/if-you-are-sick/care-for-someone.html     [facility]: Interim Guidance for Hospital Discharge to Home: www.health.[facility].[place]/diseases/coronavirus/hcp/hospdischarge.pdf    [facility] clinical trials (COVID-19 research studies): clinicalaffairs.[facility]/umn-clinical-trials     Below are the COVID-19 hotlines at the [place] Department of Health ([facility]). Interpreters are available.   o For  health questions: Call 499-746-6323 or 1-181.487.7160 (7 a.m. to 7 p.m.)  o For questions about schools and childcare: Call 478-629-0095 or 1-972.678.3967 (7 a.m. to 7 p.m.)                       Reason for Disposition    [1] Pregnant 23 or more weeks AND [2] baby moving less today by kick count  (e.g., kick count < 5 in 1 hour or < 10 in 2 hours)    Additional Information    Negative: Sounds like a life-threatening emergency to the triager    Negative: New blurred vision or vision changes    Negative: [1] SEVERE headache AND [2] not relieved with acetaminophen (e.g., Tylenol)    Negative: Leakage of fluid from vagina    Protocols used: PREGNANCY - DECREASED FETAL MOVEMENT-A-    OB Triage Call      Is patient's OB/Midwife with the [facility]? [facility]- Proceed with triage     Reason for call: Decreased fetal movement    Assessment: Father states that since yesterday baby is not kicking as he normally does.      Plan: Go to L & D    Patient plans to deliver at [facility]     Patient's primary OB Provider is  Tanisha Oviedo MD.      Per protocol recommendations Patient to be evaluated in L&D. Patient's primary OB is [facility].  Labor and delivery at [facility] ([phone]) notified of patient's pending arrival.  Report given to Azucena.    Is patient's delivering hospital on divert? No      39w0d    Estimated Date of Delivery: 2022        OB History    Para Term  AB Living   4 2 2 0 1 2   SAB IAB Ectopic Multiple Live Births   1 0 0 0 2      # Outcome Date GA Lbr Christiano/2nd Weight Sex Delivery Anes PTL Lv   4 Current            3 Term 10/11/18 38w4d 08:05 / 00:33 3.79 kg (8 lb 5.7 oz) M Vag-Spont EPI N FERNANDO      Name: ARPITA,BABY1 PARIFINKANDY      Apgar1: 9  Apgar5: 9   2 Term 16 39w3d 05:01 / 00:32 3.155 kg (6 lb 15.3 oz) M Vag-Spont EPI N FERNANDO      Name: OBIOMA, NERY MUNACHISOM      Apgar1: 9  Apgar5: 9   1 SAB 08/13/15               Lab Results   Component Value Date     GBS Negative 09/24/2018          Shereen Cline, RN 01/23/22 7:43 AM  [facility] Nurse Advisor

## 2022-01-23 NOTE — H&P
No significant change in general health status based on examination of the patient, review of Nursing Admission Database and prenatal record.    31 year old  39w0d presents for elective induction of labor  Cervix on admission was 3/50/-2  GBS negative status  Pregnancy complicated by anemia     AF, VSS however blood pressure readings are noted to be mild range     FHT: 130, moderate variability, accels present, no decels  [place]: none    A/P: 31 year old  39w0d, elective induction of labor  -- discussed mode of induction would be Pitocin administration in combination with AROM  -- comfort care measures as needed  -- anticipate vaginal delivery     Tanisha Oviedo MD  [facility]

## 2022-01-23 NOTE — PROVIDER NOTIFICATION
01/23/22 1612   Provider Notification   Provider Name/Title Dr Zafar   Method of Notification Phone   Request Attend Delivery   Updated pt 10 cm and feeling pushy. MD on way.

## 2022-01-23 NOTE — ANESTHESIA PROCEDURE NOTES
Epidural catheter Procedure Note    Pre-Procedure   Staff -        Anesthesiologist:  Michele Ag MD       Performed By: anesthesiologist       Location: OB       Pre-Anesthestic Checklist: patient identified, IV checked, risks and benefits discussed, informed consent, monitors and equipment checked, pre-op evaluation and at physician/surgeon's request  Timeout:       Correct Patient: Yes        Correct Procedure: Yes        Correct Site: Yes        Correct Position: Yes   Procedure Documentation  Procedure: epidural catheter       Diagnosis: labor       Patient Position: sitting       Patient Prep/Sterile Barriers: sterile gloves, mask, patient draped       Skin prep: Betadine       Local skin infiltrated with 3 mL of 1% lidocaine.        Insertion Site: L3-4. (midline approach).       Technique: LORT saline        JOSE at 5 cm.       Needle Type: ToTextÃ¡doy needle       Needle Gauge: 17.        Needle Length (Inches): 3.5        Catheter: 19 G.         Catheter threaded easily.         Threaded 10 cm at skin.         # of attempts: 1 and  # of redirects:  0    Assessment/Narrative         Paresthesias: No.      Test dose of 3 mL lidocaine 1.5% w/ 1:200,000 epinephrine at.         Test dose negative, 3 minutes after injection, for signs of intravascular, subdural, or intrathecal injection.       Insertion/Infusion Method: LORT saline       Aspiration negative for Heme or CSF via Epidural Catheter.     Comments:  Procedure tolerated well without apparent complications. Initial MDA bolus of 0.25% bupivacaine given. Epidural infusion (0.125% bupi with fentanyl 2mcg/ml) started at 10 ml/hr with PCEA of 5 ml q15min with a max cumulative dose of 25 ml/hr. PCEA instructions given and use encouraged PRN. Epidural expectations again reviewed and questions answered. Patient hemodynamically stable.  Patient and epidural functionality to be reassessed later via vital sign flowsheet, nursing communication, and/or patient  report.  Anesthesiologist immediately available for ongoing assessment and management.

## 2022-01-23 NOTE — PROVIDER NOTIFICATION
22 0900   Provider Notification   Provider Name/Title Dr Oviedo   Method of Notification Phone   Request Evaluate - Remote   Notification Reason Patient Arrived;Status Update   Data: Patient presented to Birthplace: 2022  8:15 AM.  Reason for maternal/fetal assessment is decreased fetal movement. Patient reports decreased fetal movement overnight. Pt was a scheduled elective induction today that was put on hold.  Came in this AM for concerns regarding fetal movement. Patient is a .  Prenatal record reviewed. Pregnancy has been uncomplicated..  Gestational Age 39w0d. VSS. Fetal movement decreased since middle of night. Patient denies uterine contractions, leaking of vaginal fluid/rupture of membranes, abdominal pain, pelvic pressure, nausea, vomiting, headache, visual disturbances, epigastric or URQ pain, significant edema.  Pt does state that she had felt some heart palpations over last 2 days. Support person is present.   Action: Verbal consent for EFM. Triage assessment completed. Bill of rights reviewed.  Response: Patient verbalized agreement with plan. Will contact Dr Tanisha Oviedo with update and for further orders.     Updated on pt arrival and pt complaints. FHR cat 1 strip, moderate variability, accels present..  Able to keep pt now and start induction.  Elevated blood pressure noted, orders for labs received.

## 2022-01-23 NOTE — PROVIDER NOTIFICATION
01/23/22 1012   Provider Notification   Provider Name/Title Dr Oviedo   Method of Notification At Bedside   Notification Reason SVE   SVE and amniotomy done. MD gave VORB that pt may have labor epidural prn.

## 2022-01-23 NOTE — PROVIDER NOTIFICATION
01/23/22 1528   Provider Notification   Provider Name/Title Dr Oviedo   Method of Notification Phone   Request Evaluate - Remote   Notification Reason Variability Change   Updated MD that variability has been minimal for a little over 25 min despite position changes and hesitant to given more fluid boluses due to elevated BP and previous bolus from decel earlier, pitocin is at 2 mu/min, contractions q 3-5 min, pt had one variable with nadar down to 80s. MD stated to keep pitocin at 2 mu/min until cat 1 fhts.

## 2022-01-23 NOTE — PROVIDER NOTIFICATION
01/23/22 1356   Provider Notification   Provider Name/Title Dr Oviedo   Method of Notification At Bedside   Notification Reason Decels   MD reviewed FHTs with 5 min prolonged, now resolved, pitocin turned off and fse on. MD gave VORB to restart pitocin after 30 min if cat 1 FHTs.

## 2022-01-23 NOTE — PROVIDER NOTIFICATION
01/23/22 1310   Provider Notification   Provider Name/Title Dr Oviedo   Method of Notification Phone   Request Evaluate - Remote   Notification Reason SVE;Labor Status;Lab/Diagnostic Study   Updated MD that pt is comfortable with labor epidural, sve 4cm, pitocin at 6mu/min, P:C ratio is 0.3, ALT/AST wnl, creatinine wnl, hemoglobin 9. No new orders given.   yes

## 2022-01-23 NOTE — ANESTHESIA PREPROCEDURE EVALUATION
Anesthesia Pre-Procedure Evaluation    Patient: Olimpia ALMARAZ Obioma   MRN: [id number] : 1990        Preoperative Diagnosis: * No pre-op diagnosis entered *    Procedure : * No procedures listed *          Past Medical History:   Diagnosis Date     Anemia      Constipation      COVID     symptoms 10/26/21, pos test 10/27/21     Eczema, dyshidrotic      Migraine      Migraines      Varicella       Past Surgical History:   Procedure Laterality Date     APPENDECTOMY        Allergies   Allergen Reactions     Compazine [Prochlorperazine] Swelling     Swelling of tongue     Zantac [Ranitidine] Other (See Comments)     IV Zantac caused : Anxiety      Social History     Tobacco Use     Smoking status: Never Smoker     Smokeless tobacco: Never Used   Substance Use Topics     Alcohol use: No     Alcohol/week: 0.0 standard drinks      Wt Readings from Last 1 Encounters:   22 80.9 kg (178 lb 4.8 oz)        Anesthesia Evaluation   Pt has had prior anesthetic. Type: Regional and General.    No history of anesthetic complications       ROS/MED HX  ENT/Pulmonary:  - neg pulmonary ROS   (+) recent URI, resolved,     Neurologic:     (+) migraines,     Cardiovascular:  - neg cardiovascular ROS     METS/Exercise Tolerance:     Hematologic:  - neg hematologic  ROS     Musculoskeletal:  - neg musculoskeletal ROS     GI/Hepatic:     (+) GERD, Asymptomatic on medication,     Renal/Genitourinary:  - neg Renal ROS     Endo:     (+) Obesity,     Psychiatric/Substance Use:  - neg psychiatric ROS     Infectious Disease:  - neg infectious disease ROS     Malignancy:       Other:            Physical Exam    Airway        Mallampati: II   TM distance: > 3 FB   Neck ROM: full   Mouth opening: > 3 cm    Respiratory Devices and Support         Dental  no notable dental history         Cardiovascular          Rhythm and rate: regular and normal     Pulmonary   pulmonary exam normal                OUTSIDE LABS:  CBC:   Lab Results   Component  Value Date    WBC 6.9 01/23/2022    WBC 4.6 11/01/2021    HGB 9.6 (L) 01/23/2022    HGB 10.9 (L) 11/01/2021    HCT 29.3 (L) 01/23/2022    HCT 32.9 (L) 11/01/2021     01/23/2022     (L) 11/01/2021     BMP:   Lab Results   Component Value Date     01/23/2022     11/01/2021    POTASSIUM 4.1 01/23/2022    POTASSIUM 3.5 11/01/2021    CHLORIDE 108 01/23/2022    CHLORIDE 106 11/01/2021    CO2 24 01/23/2022    CO2 26 11/01/2021    BUN 15 01/23/2022    BUN 10 11/01/2021    CR 0.88 01/23/2022    CR 0.52 11/01/2021    GLC 64 (L) 01/23/2022     (H) 11/01/2021     COAGS:   Lab Results   Component Value Date    INR 1.10 06/22/2021     POC:   Lab Results   Component Value Date    BGM 92 09/16/2018     HEPATIC:   Lab Results   Component Value Date    ALBUMIN 2.6 (L) 01/23/2022    PROTTOTAL 6.5 (L) 01/23/2022    ALT 32 01/23/2022    AST 19 01/23/2022    ALKPHOS 139 01/23/2022    BILITOTAL 0.2 01/23/2022     OTHER:   Lab Results   Component Value Date    A1C 5.5 06/30/2021    DUNG 9.1 01/23/2022    MAG 1.6 01/02/2016    LIPASE 85 06/19/2021    TSH 0.80 08/05/2020    T4 1.30 12/31/2015       Anesthesia Plan    ASA Status:  2   - Procedure: Procedure only, no anesthetic delivered      Anesthesia Type: Epidural.              Consents    Anesthesia Plan(s) and associated risks, benefits, and realistic alternatives discussed. Questions answered and patient/representative(s) expressed understanding.    - Discussed:     - Discussed with:  Patient         Postoperative Care            Comments:    Other Comments: Continuous Labor Epidural: Indication is for labor pain. Following an discussion of the procedure, epidural expectations, and risks and benefits (risks including, but not limited to, nerve damage, infection, bleeding/hematoma, inadvertent dural puncture, spinal headache, partial or failed block), the patient appears to understand and consents to proceed. Questions were encouraged and answered.              Michele Ag MD

## 2022-01-23 NOTE — L&D DELIVERY NOTE
Delivery Summary    Olimpia Herrera MRN# [id number]   Age: 31 year old YOB: 1990       31 year old  39w0d presented for elective induction of labor  GBS negative status  Cervix on admission was 3/80/-2 at 0948,   Pitocin induction with max to 6 mU/min  AROM at 1012,  with clear amniotic fluid   S/p epidural   Category 1 tracing but one moment of prolonged deceleration to 80 for 1 minute  Pitocin turned off and resumed 30 minutes later to max 2 mU/min  Progressed to complete dilation/0 station at 1613,      at 1620,   Viable female infant delivered in cephalic presentation  No nuchal cord   Weight  6 lbs 8 oz  Apgar 8 and 9 at 1 and 5 minutes, respectively   Placenta was delivered spontaneously, intact  No perineal or berna-urethral lacerations  QBL 50 mL   Infant name is Justino Oviedo MD  [facility]            Obioma, Female-Rofina [[id number]]    Labor Event Times    Labor onset date: 22 Onset time: 11:00 AM   Dilation complete date: 22 Complete time:  4:13 PM   Start pushing date/time: 2022 1620      Labor Events     labor?: No   steroids: None  Labor Type: Spontaneous, AROM  Predominate monitoring during 1st stage: continuous electronic fetal monitoring     Antibiotics received during labor?: No     Rupture date/time: 22 1019   Rupture type: Artificial Rupture of Membranes  Fluid color: Clear  Fluid odor: Normal     Induction: Oxytocin, AROM  Induction date/time:     Cervical ripening date/time:        1:1 continuous labor support provided by?: RN       Delivery/Placenta Date and Time    Delivery Date: 22 Delivery Time:  4:20 PM   Placenta Date/Time: 2022  4:33 PM  Oxytocin given at the time of delivery: after delivery of baby  Delivering clinician: Tanisha Oviedo MD   Other personnel present at delivery:  Provider Role   Persons, Azucena K, RN Registered Nurse   Tanisha Oviedo  "MD Nitesh Obstetrician         Vaginal Counts     Initial count performed by 2 team members:  Two Team Members   anthony Oviedo       Needles Suture Needles Sponges (RETIRED) Instruments   Initial counts 2  5    Added to count       Relief counts       Final counts             Placed during labor Accounted for at the end of labor   FSE NA    IUPC NA    Cervadil                       Apgars    Living status: Living   1 Minute 5 Minute 10 Minute 15 Minute 20 Minute   Skin color: 0  1       Heart rate: 2  2       Reflex irritability: 2  2       Muscle tone: 2  2       Respiratory effort: 2  2       Total: 8  9       Apgars assigned by: ANTHONY KIM RN     Cord    Vessels: 3 Vessels    Cord Complications: None               Cord Blood Disposition: Lab    Gases Sent?: No    Delayed cord clamping?: Yes    Cord Clamping Delay (seconds): 31-60 seconds        Resuscitation    Methods: None     [place] Measurements    Weight: 6 lb 8.1 oz Length: 1' 7.75\"   Head circumference: 31.8 cm       Skin to Skin and Feeding Plan    Skin to skin initiation date/time: 1841    Skin to skin with: Mother  Skin to skin end date/time:        Labor Events and Shoulder Dystocia    Fetal Tracing Prior to Delivery: Category 2, Category 1  Shoulder dystocia present?: Neg     Delivery (Maternal) (Provider to Complete) (042187)    Episiotomy: None  Perineal lacerations: None    Repair suture: None  Genital tract inspection done: Pos     Blood Loss  Mother: Olimpia Herrera #[id number]   Start of Mother's Information    Delivery Blood Loss  22 1100 - 22 1648    Delivery QBL (mL) Hospital Encounter 50 mL    Total  50 mL         End of Mother's Information  Mother: Olimpia Herrera #[id number]          Delivery - Provider to Complete ([id number])    Delivering clinician: Tanisha Oviedo MD  Attempted Delivery Types (Choose all that apply): Spontaneous Vaginal Delivery  Delivery Type (Choose the 1 that will go to " the Birth History): Vaginal, Spontaneous                   Other personnel:  Provider Role   Persons, Azucena K, RN Registered Nurse   Preet, Tanisha Dowling MD Obstetrician                Placenta    Date/Time: 1/23/2022  4:33 PM  Removal: Spontaneous  Disposition: Hospital disposal           Anesthesia    Method: Epidural  Cervical dilation at placement: 0-3                Presentation and Position    Presentation: Vertex    Position: Middle Occiput Anterior                 Tanisha Oviedo MD

## 2022-01-24 LAB — T PALLIDUM AB SER QL: NONREACTIVE

## 2022-01-24 PROCEDURE — 250N000013 HC RX MED GY IP 250 OP 250 PS 637: Performed by: OBSTETRICS & GYNECOLOGY

## 2022-01-24 PROCEDURE — 120N000001 HC R&B MED SURG/OB

## 2022-01-24 RX ORDER — ACETAMINOPHEN 325 MG/1
975 TABLET ORAL ONCE
Status: COMPLETED | OUTPATIENT
Start: 2022-01-25 | End: 2022-01-24

## 2022-01-24 RX ORDER — PRENATAL VIT/IRON FUM/FOLIC AC 27MG-0.8MG
1 TABLET ORAL 2 TIMES DAILY
Status: DISCONTINUED | OUTPATIENT
Start: 2022-01-24 | End: 2022-01-26 | Stop reason: HOSPADM

## 2022-01-24 RX ADMIN — PRENATAL VITAMINS-IRON FUMARATE 27 MG IRON-FOLIC ACID 0.8 MG TABLET 1 TABLET: at 08:38

## 2022-01-24 RX ADMIN — DOCUSATE SODIUM 100 MG: 100 CAPSULE, LIQUID FILLED ORAL at 08:38

## 2022-01-24 RX ADMIN — IBUPROFEN 800 MG: 800 TABLET, FILM COATED ORAL at 15:27

## 2022-01-24 RX ADMIN — IBUPROFEN 800 MG: 800 TABLET, FILM COATED ORAL at 04:37

## 2022-01-24 RX ADMIN — OMEPRAZOLE 40 MG: 20 CAPSULE, DELAYED RELEASE ORAL at 08:38

## 2022-01-24 RX ADMIN — IBUPROFEN 800 MG: 800 TABLET, FILM COATED ORAL at 21:22

## 2022-01-24 RX ADMIN — ACETAMINOPHEN 975 MG: 325 TABLET, FILM COATED ORAL at 23:58

## 2022-01-24 ASSESSMENT — ACTIVITIES OF DAILY LIVING (ADL)
ADLS_ACUITY_SCORE: 5
ADLS_ACUITY_SCORE: 7
ADLS_ACUITY_SCORE: 5
ADLS_ACUITY_SCORE: 7
ADLS_ACUITY_SCORE: 5
ADLS_ACUITY_SCORE: 7
ADLS_ACUITY_SCORE: 5

## 2022-01-24 NOTE — PLAN OF CARE
VSS. No severe BP overnight.  Denies s/s of PreE.  Pain well controlled. Slight light headed when ambulating to bathroom; SBA required. Fundal checks and bleeding WNL. Voiding without difficulty, frequent voiding encouraged.  Breastfeeding and formula feeding per parents preference.  FOB present overnight and supportive.    Patients mobililty level scored using the bedside mobility assistance tool (BMAT). Patient is at a mobility level test number: 2. Mobility equipment used: none required. Required assist of 1 staff members. Further use of BMAT scoring required.

## 2022-01-24 NOTE — PLAN OF CARE
Data: Olimpia Herrera transferred to 444 via wheelchair at 1840. Baby transferred via parent's arms.  Action: Receiving unit notified of transfer: Yes. Patient and family notified of room change. Report given to FABI Mcnair at 1908. Belongings sent to receiving unit. Accompanied by Registered Nurse. Oriented patient to surroundings. Call light within reach. ID bands double-checked with receiving RN.  Response: Patient tolerated transfer and is stable.

## 2022-01-24 NOTE — PLAN OF CARE
Patient has remained stable today. Blood pressures slightly elevated but not severe. Denies any other s/s. IV access lost due to significant pain at the site for patient, slight redness. Ice pack applied after removal. Independent with self and  cares. EPDS completed: 0, birth certificate turned in.  present and very supportive.

## 2022-01-24 NOTE — PROVIDER NOTIFICATION
01/23/22 1716   Provider Notification   Provider Name/Title Dr Oviedo   Method of Notification Phone   Request Evaluate-Remote   Notification Reason Vital Signs Change   Updated MD that pt has 2 severe range BPs 15 min apart, 161/79 and 181/91, pt denies any pre e symptoms. MD gave TORB to put in the pre e orderset and to treat BPs with the hydralazine protocol.

## 2022-01-24 NOTE — PROGRESS NOTES
[facility] Post-Partum Progress Note          Assessment and Plan:    Assessment:   Post-partum day #1  Normal spontaneous vaginal delivery  Delivered 18/238/22 at 16:20 hrs  L&D complications: Pt had elevated BP readings 1/23/22 of 143-186/79-91  Now normotensive  Pt received a dose of IV hydralazine  Normal PIH labs      Doing well.  Clean wound without signs of infection.  No excessive bleeding  Pain well-controlled.  Pt has slight headache without visual changes or RUQ discomfort      Plan:   Ambulation encouraged  Breast feeding strategies discussed  Monitor wound for signs of infection  Pain control measures as needed  Reportable signs and symptoms dicussed with the patient  Will continue to monitor closely  Findings rev with pt  All of her questions were answered  Anticipate discharge tomorrow           Interval History:   Doing well.  Pain is well-controlled.  No fevers.  No history of foul-smelling vaginal discharge.  Good appetite.  Denies chest pain, shortness of breath, nausea or vomiting.  Vaginal bleeding is similar to a heavy menstrual flow.  Ambulatory.  Breastfeeding well.          Significant Problems:      Past Medical History:   Diagnosis Date     Anemia      Constipation      COVID     symptoms 10/26/21, pos test 10/27/21     Eczema, dyshidrotic      Migraine      Migraines      Varicella              Review of Systems:    The patient denies any chest pain, shortness of breath, excessive pain, fever, chills, purulent drainage from the wound, nausea or vomiting.          Medications:     All medications related to the patient's surgery have been reviewed  Current Facility-Administered Medications   Medication     acetaminophen (TYLENOL) tablet 650 mg     benzocaine (AMERICAINE) 20 % topical spray     bisacodyl (DULCOLAX) Suppository 10 mg     carboprost (HEMABATE) injection 250 mcg     docusate sodium (COLACE) capsule 100 mg     hydrALAZINE (APRESOLINE)  algorithm-medication instruction     hydrALAZINE (APRESOLINE) injection 5-10 mg     hydrocortisone 2.5 % cream     ibuprofen (ADVIL/MOTRIN) tablet 800 mg     labetalol (NORMODYNE/TRANDATE) injection 20-40 mg     lanolin cream     LORazepam (ATIVAN) injection 2 mg     magnesium sulfate 2 g in water intermittent infusion     magnesium sulfate 4 g in 100 mL sterile water (premade)     magnesium sulfate injection 10 g     methylergonovine (METHERGINE) injection 200 mcg     misoprostol (CYTOTEC) tablet 400 mcg    Or     misoprostol (CYTOTEC) tablet 800 mcg     nalbuphine (NUBAIN) injection 2.5-5 mg     No MMR Needed - Assessment: Patient does not need MMR vaccine     No Tdap Needed - Assessment: Patient does not need Tdap vaccine     omeprazole (priLOSEC) CR capsule 40 mg     oxytocin (PITOCIN) 30 units in 500 mL 0.9% NaCl infusion     oxytocin (PITOCIN) injection 10 Units     prenatal multivitamin w/iron per tablet 1 tablet     tranexamic acid 1 g in 100 mL NS IV bag (premix)             Physical Exam:   Vitals were reviewed  All vitals stable  Temp: 98.7  F (37.1  C) Temp src: Oral BP: 121/86 Pulse: 101   Resp: 16 SpO2: 98 %      Uterine fundus is firm, non-tender and at the level of the umbilicus          Data:     All laboratory data related to this surgery reviewed  Hemoglobin   Date Value Ref Range Status   01/23/2022 9.6 (L) 11.7 - 15.7 g/dL Final   11/01/2021 10.9 (L) 11.7 - 15.7 g/dL Final   10/31/2021 10.9 (L) 11.7 - 15.7 g/dL Final   10/25/2021 9.9 (L) 11.7 - 15.7 g/dL Final   06/30/2021 11.0 (L) 11.7 - 15.7 g/dL Final   06/21/2021 11.9 11.7 - 15.7 g/dL Final   06/19/2021 10.7 (L) 11.7 - 15.7 g/dL Final   06/18/2021 11.7 11.7 - 15.7 g/dL Final   10/17/2018 10.3 (L) 11.7 - 15.7 g/dL Final     All imaging studies related to this surgery reviewed    Bishop Scott MD

## 2022-01-24 NOTE — ANESTHESIA POSTPROCEDURE EVALUATION
Patient: Rofina O Obioma    Procedure: * No procedures listed *       Diagnosis:* No pre-op diagnosis entered *  Diagnosis Additional Information: No value filed.    Anesthesia Type:  Epidural    Note:     Postop Pain Control:    PONV:    Neuro/Psych:    Airway/Respiratory:    CV/Hemodynamics:    Other NRE:    DID A NON-ROUTINE EVENT OCCUR?     Event details/Postop Comments:      S/P epidural for labor.   I or my partner was immediately available for management of this patient during epidural analgesia infusion.  VSS.  Doing well. Block resolved.  Neuro at baseline. Denies positional headache. Minimal side effects easily managed w/ PRN meds. No apparent anesthetic complications. No follow-up required.    Conrad Newton MD             Last vitals:  Vitals Value Taken Time   BP     Temp     Pulse     Resp     SpO2         Electronically Signed By: Conrad Newton MD  January 24, 2022  11:39 AM

## 2022-01-25 ENCOUNTER — ANESTHESIA (OUTPATIENT)
Dept: OBGYN | Facility: CLINIC | Age: 32
End: 2022-01-25
Payer: COMMERCIAL

## 2022-01-25 ENCOUNTER — APPOINTMENT (OUTPATIENT)
Dept: CT IMAGING | Facility: CLINIC | Age: 32
End: 2022-01-25
Attending: OBSTETRICS & GYNECOLOGY
Payer: COMMERCIAL

## 2022-01-25 ENCOUNTER — ANESTHESIA EVENT (OUTPATIENT)
Dept: OBGYN | Facility: CLINIC | Age: 32
End: 2022-01-25
Payer: COMMERCIAL

## 2022-01-25 LAB
ALBUMIN SERPL-MCNC: 2.9 G/DL (ref 3.4–5)
ALBUMIN SERPL-MCNC: 3.1 G/DL (ref 3.4–5)
ALP SERPL-CCNC: 124 U/L (ref 40–150)
ALP SERPL-CCNC: 135 U/L (ref 40–150)
ALT SERPL W P-5'-P-CCNC: 29 U/L (ref 0–50)
ALT SERPL W P-5'-P-CCNC: 31 U/L (ref 0–50)
ANION GAP SERPL CALCULATED.3IONS-SCNC: 7 MMOL/L (ref 3–14)
ANION GAP SERPL CALCULATED.3IONS-SCNC: 8 MMOL/L (ref 3–14)
AST SERPL W P-5'-P-CCNC: 19 U/L (ref 0–45)
AST SERPL W P-5'-P-CCNC: 20 U/L (ref 0–45)
BILIRUB SERPL-MCNC: 0.3 MG/DL (ref 0.2–1.3)
BILIRUB SERPL-MCNC: 0.3 MG/DL (ref 0.2–1.3)
BUN SERPL-MCNC: 11 MG/DL (ref 7–30)
BUN SERPL-MCNC: 14 MG/DL (ref 7–30)
CALCIUM SERPL-MCNC: 9 MG/DL (ref 8.5–10.1)
CALCIUM SERPL-MCNC: 9.2 MG/DL (ref 8.5–10.1)
CHLORIDE BLD-SCNC: 108 MMOL/L (ref 94–109)
CHLORIDE BLD-SCNC: 108 MMOL/L (ref 94–109)
CO2 SERPL-SCNC: 23 MMOL/L (ref 20–32)
CO2 SERPL-SCNC: 24 MMOL/L (ref 20–32)
CREAT SERPL-MCNC: 0.79 MG/DL (ref 0.52–1.04)
CREAT SERPL-MCNC: 0.99 MG/DL (ref 0.52–1.04)
ERYTHROCYTE [DISTWIDTH] IN BLOOD BY AUTOMATED COUNT: 14.7 % (ref 10–15)
GFR SERPL CREATININE-BSD FRML MDRD: 77 ML/MIN/1.73M2
GFR SERPL CREATININE-BSD FRML MDRD: >90 ML/MIN/1.73M2
GLUCOSE BLD-MCNC: 109 MG/DL (ref 70–99)
GLUCOSE BLD-MCNC: 73 MG/DL (ref 70–99)
HCT VFR BLD AUTO: 32.2 % (ref 35–47)
HGB BLD-MCNC: 10.6 G/DL (ref 11.7–15.7)
HOLD SPECIMEN: NORMAL
MAGNESIUM SERPL-MCNC: 5.3 MG/DL (ref 1.6–2.3)
MCH RBC QN AUTO: 30.5 PG (ref 26.5–33)
MCHC RBC AUTO-ENTMCNC: 32.9 G/DL (ref 31.5–36.5)
MCV RBC AUTO: 93 FL (ref 78–100)
PLATELET # BLD AUTO: 183 10E3/UL (ref 150–450)
POTASSIUM BLD-SCNC: 3.6 MMOL/L (ref 3.4–5.3)
POTASSIUM BLD-SCNC: 3.6 MMOL/L (ref 3.4–5.3)
PROT SERPL-MCNC: 7.3 G/DL (ref 6.8–8.8)
PROT SERPL-MCNC: 7.8 G/DL (ref 6.8–8.8)
RBC # BLD AUTO: 3.48 10E6/UL (ref 3.8–5.2)
SODIUM SERPL-SCNC: 138 MMOL/L (ref 133–144)
SODIUM SERPL-SCNC: 140 MMOL/L (ref 133–144)
WBC # BLD AUTO: 7.2 10E3/UL (ref 4–11)

## 2022-01-25 PROCEDURE — 370N000003 HC ANESTHESIA WARD SERVICE

## 2022-01-25 PROCEDURE — 250N000013 HC RX MED GY IP 250 OP 250 PS 637: Performed by: OBSTETRICS & GYNECOLOGY

## 2022-01-25 PROCEDURE — 84450 TRANSFERASE (AST) (SGOT): CPT | Performed by: OBSTETRICS & GYNECOLOGY

## 2022-01-25 PROCEDURE — 120N000001 HC R&B MED SURG/OB

## 2022-01-25 PROCEDURE — 250N000011 HC RX IP 250 OP 636: Performed by: OBSTETRICS & GYNECOLOGY

## 2022-01-25 PROCEDURE — 85027 COMPLETE CBC AUTOMATED: CPT | Performed by: OBSTETRICS & GYNECOLOGY

## 2022-01-25 PROCEDURE — 36415 COLL VENOUS BLD VENIPUNCTURE: CPT | Performed by: OBSTETRICS & GYNECOLOGY

## 2022-01-25 PROCEDURE — 258N000003 HC RX IP 258 OP 636: Performed by: OBSTETRICS & GYNECOLOGY

## 2022-01-25 PROCEDURE — 70450 CT HEAD/BRAIN W/O DYE: CPT

## 2022-01-25 PROCEDURE — 83735 ASSAY OF MAGNESIUM: CPT | Performed by: OBSTETRICS & GYNECOLOGY

## 2022-01-25 PROCEDURE — 84155 ASSAY OF PROTEIN SERUM: CPT | Performed by: OBSTETRICS & GYNECOLOGY

## 2022-01-25 PROCEDURE — 99232 SBSQ HOSP IP/OBS MODERATE 35: CPT | Mod: 24 | Performed by: OBSTETRICS & GYNECOLOGY

## 2022-01-25 RX ORDER — MAGNESIUM SULFATE HEPTAHYDRATE 500 MG/ML
10 INJECTION, SOLUTION INTRAMUSCULAR; INTRAVENOUS
Status: DISCONTINUED | OUTPATIENT
Start: 2022-01-25 | End: 2022-01-26 | Stop reason: HOSPADM

## 2022-01-25 RX ORDER — LABETALOL HYDROCHLORIDE 5 MG/ML
20-80 INJECTION, SOLUTION INTRAVENOUS EVERY 10 MIN PRN
Status: DISCONTINUED | OUTPATIENT
Start: 2022-01-25 | End: 2022-01-26 | Stop reason: HOSPADM

## 2022-01-25 RX ORDER — MAGNESIUM SULFATE HEPTAHYDRATE 40 MG/ML
4 INJECTION, SOLUTION INTRAVENOUS ONCE
Status: COMPLETED | OUTPATIENT
Start: 2022-01-25 | End: 2022-01-25

## 2022-01-25 RX ORDER — CALCIUM GLUCONATE 94 MG/ML
1 INJECTION, SOLUTION INTRAVENOUS
Status: COMPLETED | OUTPATIENT
Start: 2022-01-25 | End: 2022-01-25

## 2022-01-25 RX ORDER — MAGNESIUM SULFATE IN WATER 40 MG/ML
2 INJECTION, SOLUTION INTRAVENOUS CONTINUOUS
Status: DISCONTINUED | OUTPATIENT
Start: 2022-01-25 | End: 2022-01-26 | Stop reason: HOSPADM

## 2022-01-25 RX ORDER — NIFEDIPINE 30 MG/1
30 TABLET, EXTENDED RELEASE ORAL DAILY
Status: DISCONTINUED | OUTPATIENT
Start: 2022-01-25 | End: 2022-01-26 | Stop reason: HOSPADM

## 2022-01-25 RX ORDER — NIFEDIPINE 10 MG/1
20 CAPSULE ORAL ONCE
Status: COMPLETED | OUTPATIENT
Start: 2022-01-25 | End: 2022-01-25

## 2022-01-25 RX ORDER — KETOROLAC TROMETHAMINE 15 MG/ML
15 INJECTION, SOLUTION INTRAMUSCULAR; INTRAVENOUS ONCE
Status: COMPLETED | OUTPATIENT
Start: 2022-01-25 | End: 2022-01-25

## 2022-01-25 RX ORDER — MAGNESIUM SULFATE HEPTAHYDRATE 40 MG/ML
4 INJECTION, SOLUTION INTRAVENOUS
Status: DISCONTINUED | OUTPATIENT
Start: 2022-01-25 | End: 2022-01-26 | Stop reason: HOSPADM

## 2022-01-25 RX ORDER — SODIUM CHLORIDE, SODIUM LACTATE, POTASSIUM CHLORIDE, CALCIUM CHLORIDE 600; 310; 30; 20 MG/100ML; MG/100ML; MG/100ML; MG/100ML
INJECTION, SOLUTION INTRAVENOUS CONTINUOUS
Status: DISCONTINUED | OUTPATIENT
Start: 2022-01-25 | End: 2022-01-26 | Stop reason: HOSPADM

## 2022-01-25 RX ORDER — LORAZEPAM 2 MG/ML
2 INJECTION INTRAMUSCULAR
Status: DISCONTINUED | OUTPATIENT
Start: 2022-01-25 | End: 2022-01-26 | Stop reason: HOSPADM

## 2022-01-25 RX ORDER — MAGNESIUM SULFATE HEPTAHYDRATE 40 MG/ML
2 INJECTION, SOLUTION INTRAVENOUS
Status: DISCONTINUED | OUTPATIENT
Start: 2022-01-25 | End: 2022-01-26 | Stop reason: HOSPADM

## 2022-01-25 RX ORDER — HYDRALAZINE HYDROCHLORIDE 20 MG/ML
10 INJECTION INTRAMUSCULAR; INTRAVENOUS
Status: DISCONTINUED | OUTPATIENT
Start: 2022-01-25 | End: 2022-01-26 | Stop reason: HOSPADM

## 2022-01-25 RX ADMIN — OMEPRAZOLE 40 MG: 20 CAPSULE, DELAYED RELEASE ORAL at 08:11

## 2022-01-25 RX ADMIN — SODIUM CHLORIDE, POTASSIUM CHLORIDE, SODIUM LACTATE AND CALCIUM CHLORIDE 1000 ML: 600; 310; 30; 20 INJECTION, SOLUTION INTRAVENOUS at 22:50

## 2022-01-25 RX ADMIN — NIFEDIPINE 30 MG: 30 TABLET, FILM COATED, EXTENDED RELEASE ORAL at 17:48

## 2022-01-25 RX ADMIN — PRENATAL VITAMINS-IRON FUMARATE 27 MG IRON-FOLIC ACID 0.8 MG TABLET 1 TABLET: at 08:11

## 2022-01-25 RX ADMIN — DOCUSATE SODIUM 100 MG: 100 CAPSULE, LIQUID FILLED ORAL at 08:11

## 2022-01-25 RX ADMIN — IBUPROFEN 800 MG: 800 TABLET, FILM COATED ORAL at 03:35

## 2022-01-25 RX ADMIN — ACETAMINOPHEN 650 MG: 325 TABLET, FILM COATED ORAL at 16:41

## 2022-01-25 RX ADMIN — MAGNESIUM SULFATE HEPTAHYDRATE 4 G: 40 INJECTION, SOLUTION INTRAVENOUS at 17:08

## 2022-01-25 RX ADMIN — MAGNESIUM SULFATE HEPTAHYDRATE 2 G/HR: 40 INJECTION, SOLUTION INTRAVENOUS at 17:45

## 2022-01-25 RX ADMIN — SODIUM CHLORIDE, POTASSIUM CHLORIDE, SODIUM LACTATE AND CALCIUM CHLORIDE 1000 ML: 600; 310; 30; 20 INJECTION, SOLUTION INTRAVENOUS at 17:08

## 2022-01-25 RX ADMIN — KETOROLAC TROMETHAMINE 15 MG: 15 INJECTION, SOLUTION INTRAMUSCULAR; INTRAVENOUS at 23:24

## 2022-01-25 RX ADMIN — PRENATAL VITAMINS-IRON FUMARATE 27 MG IRON-FOLIC ACID 0.8 MG TABLET 1 TABLET: at 20:53

## 2022-01-25 RX ADMIN — ACETAMINOPHEN 650 MG: 325 TABLET, FILM COATED ORAL at 20:53

## 2022-01-25 RX ADMIN — NIFEDIPINE 20 MG: 10 CAPSULE ORAL at 15:55

## 2022-01-25 RX ADMIN — CALCIUM GLUCONATE 1 G: 98 INJECTION, SOLUTION INTRAVENOUS at 22:07

## 2022-01-25 ASSESSMENT — ACTIVITIES OF DAILY LIVING (ADL)
ADLS_ACUITY_SCORE: 5

## 2022-01-25 NOTE — PLAN OF CARE
Patient meeting expected goals. Bonding well with . Using ibuprofen for pain. Able to do all self cares and cares for . Education taught to patient and patient verbalized understanding.

## 2022-01-25 NOTE — PROGRESS NOTES
[facility] Postpartum Progress Note     January 25, 2022    DAILY NOTE - POSTPARTUM DAY 2    SUBJECTIVE: Has continued to have intermittenly elevated blood pressure since delivery. One severe range pressure overnight, follow up was high but not severe. Hourly blood pressure instituted at my recommendation at 930, and only one has been high normal--the remainder are all elevated.    Pain controlled? Yes  Tolerating a regular diet? YES  Ambulating? YES  Voiding without difficulty? Yes  Lochia? minimal  Breastfeeding:  yes      OBJECTIVE:  Vitals:    01/25/22 0937 01/25/22 1031 01/25/22 1139 01/25/22 1241   BP: 135/84 (!) 141/81 (!) 135/91 (!) 155/101   Pulse: 78 106 104 (!) 126   Resp:       Temp:       TempSrc:       SpO2:           Constitutional: healthy, alert and no distress    Abdomen:  Uterine fundus is firm, non-tender and at the level of the umbilicus. No RUQ or epigastric pain.    LE:  trace edema, non-tender    LABS:  Hemoglobin   Date Value Ref Range Status   01/23/2022 9.6 (L) 11.7 - 15.7 g/dL Final   11/01/2021 10.9 (L) 11.7 - 15.7 g/dL Final   06/30/2021 11.0 (L) 11.7 - 15.7 g/dL Final   06/21/2021 11.9 11.7 - 15.7 g/dL Final     No results found for: RUBELLAABIGG   Lab Results   Component Value Date    ABO A 06/30/2021           Lab Results   Component Value Date    RH Pos 06/30/2021        ASSESSMENT:  Post-partum day #2  Normal spontaneous vaginal delivery  Postpartum hypertension  ABLA due to expected losses with delivery, without symptoms or need for therapy     PLAN:   Hourly blood pressure through the day.  Consider discharge if no treatable blood pressure ntoed--if so, would discharge with home monitor and follow up in clinic on Friday.  If persistent blood pressure >150/100, start oral antihypertensives and consider discharge tomorrow.  Discussed with patient.    Funmilayo Rogers MD

## 2022-01-25 NOTE — PROVIDER NOTIFICATION
01/25/22 1248   Provider Notification   Provider Name/Title Dr. Rogers   Method of Notification Electronic Page   Notification Reason Status Update  (bp's)     Updated of recent bp's, pt denies any other s/s. Dr Rogers states that we need to continue to monitor q1hr, pt should stay another night, if resolves possibly could go home later today.

## 2022-01-25 NOTE — PROVIDER NOTIFICATION
01/25/22 1535   Provider Notification   Provider Name/Title Dr. Rogers   Method of Notification Electronic Page   Request Evaluate-Remote   Notification Reason Vital Signs Change;Status Update   MD notified of /101, pt c/o epigastric pain, headache, blurred vision, hands swelling. TORB 20mg immediate release Nifedipine. Spoke with pharmacy and ok dose even though marked as Pediatric use only.

## 2022-01-25 NOTE — PROGRESS NOTES
Called regarding two severe range blood pressure readings 15 minutes apart.  Patient also c/o headache.  Discussed with RN:    1. Nifedipine IR 20 mg orally now.  2. Place IV  3. Stat labs.  4. Begin mag sulfate with 4g load and 2g/hr.  5. Initiate hypertensive order set (done). Will plan treatment with IV labetalol if needed.    Patient will need treatment with magnesium for 24 hours.  Will start oral nifedipine XR 30 mg as well. She will likely need discharge eventually on this med.    Funmilayo Rogers MD

## 2022-01-25 NOTE — PROVIDER NOTIFICATION
01/24/22 2349   Provider Notification   Provider Name/Title Tyler   Method of Notification Phone   Request Evaluate-Remote   Notification Reason Vital Signs Change   Updated on BP of 167/100, headache, and epigastric pain. Plan to give 975 mg of Tylenol and recheck BP in 45 mins.

## 2022-01-25 NOTE — PLAN OF CARE
VSS, except BP. One severe range, MD notified and no intervention needed, otherwise 130s/90s. Up independent in room, voiding without difficultly. Pain managed with ibuprofen. Breastfeeding well and supplementing with formula. Bonding well with infant.

## 2022-01-25 NOTE — PROVIDER NOTIFICATION
01/25/22 1550   Provider Notification   Provider Name/Title Dr Rogers   Method of Notification Electronic Page   Request Evaluate-Remote   Notification Reason Vital Signs Change   Notified MD of /105. VORB STAT CMP and CBC. Ken to enter Hypertensive order set and start on Magnesium.

## 2022-01-25 NOTE — ANESTHESIA CARE TRANSFER NOTE
Patient: Rofina O Obioma    Procedure: * No procedures listed *       Diagnosis: * No pre-op diagnosis entered *  Diagnosis Additional Information: No value filed.    Anesthesia Type:   No value filed.     Note:    Oropharynx: spontaneously breathing  Level of Consciousness: awake  Oxygen Supplementation: room air    Independent Airway: airway patency satisfactory and stable  Dentition: dentition unchanged  Vital Signs Stable: post-procedure vital signs reviewed and stable  Report to RN Given: handoff report given  Patient transferred to: Medical/Surgical Unit  Comments: VSS.  Handoff Report: Identifed the Patient, Identified the Reponsible Provider, Reviewed the pertinent medical history, Discussed the surgical course, Reviewed Intra-OP anesthesia mangement and issues during anesthesia, Set expectations for post-procedure period and Allowed opportunity for questions and acknowledgement of understanding      Vitals:  Vitals Value Taken Time   BP     Temp     Pulse     Resp     SpO2         Electronically Signed By: CYRUS Crain CRNA  January 25, 2022  5:03 PM

## 2022-01-26 VITALS
TEMPERATURE: 98.7 F | SYSTOLIC BLOOD PRESSURE: 140 MMHG | DIASTOLIC BLOOD PRESSURE: 85 MMHG | RESPIRATION RATE: 16 BRPM | OXYGEN SATURATION: 99 % | HEART RATE: 75 BPM

## 2022-01-26 PROBLEM — O21.0 HYPEREMESIS GRAVIDARUM: Status: RESOLVED | Noted: 2021-06-22 | Resolved: 2022-01-26

## 2022-01-26 PROBLEM — Z34.81 ENCOUNTER FOR SUPERVISION OF OTHER NORMAL PREGNANCY, FIRST TRIMESTER: Status: RESOLVED | Noted: 2018-10-08 | Resolved: 2022-01-26

## 2022-01-26 PROBLEM — Z36.89 ENCOUNTER FOR TRIAGE IN PREGNANT PATIENT: Status: RESOLVED | Noted: 2021-09-29 | Resolved: 2022-01-26

## 2022-01-26 PROCEDURE — 250N000013 HC RX MED GY IP 250 OP 250 PS 637: Performed by: OBSTETRICS & GYNECOLOGY

## 2022-01-26 RX ORDER — NIFEDIPINE 30 MG
30 TABLET, EXTENDED RELEASE ORAL DAILY
Qty: 50 TABLET | Refills: 0 | Status: SHIPPED | OUTPATIENT
Start: 2022-01-26 | End: 2022-02-03

## 2022-01-26 RX ORDER — IBUPROFEN 600 MG/1
600 TABLET, FILM COATED ORAL EVERY 6 HOURS PRN
Qty: 30 TABLET | Refills: 0 | Status: SHIPPED | OUTPATIENT
Start: 2022-01-26 | End: 2024-05-21

## 2022-01-26 RX ADMIN — NIFEDIPINE 30 MG: 30 TABLET, FILM COATED, EXTENDED RELEASE ORAL at 09:27

## 2022-01-26 RX ADMIN — DOCUSATE SODIUM 100 MG: 100 CAPSULE, LIQUID FILLED ORAL at 09:27

## 2022-01-26 RX ADMIN — IBUPROFEN 800 MG: 800 TABLET, FILM COATED ORAL at 05:20

## 2022-01-26 RX ADMIN — ACETAMINOPHEN 650 MG: 325 TABLET, FILM COATED ORAL at 00:46

## 2022-01-26 RX ADMIN — OMEPRAZOLE 40 MG: 20 CAPSULE, DELAYED RELEASE ORAL at 09:27

## 2022-01-26 RX ADMIN — PRENATAL VITAMINS-IRON FUMARATE 27 MG IRON-FOLIC ACID 0.8 MG TABLET 1 TABLET: at 09:27

## 2022-01-26 RX ADMIN — ACETAMINOPHEN 650 MG: 325 TABLET, FILM COATED ORAL at 05:20

## 2022-01-26 ASSESSMENT — ACTIVITIES OF DAILY LIVING (ADL)
ADLS_ACUITY_SCORE: 5

## 2022-01-26 NOTE — PROGRESS NOTES
Status update:  CT is normal.  With toradol, headache pain decreased from 10 to 6, and the patient's affect and responsiveness are much improved as well.  Continue close monitoring of blood pressure and neuro status for now.  If blood pressure <144996 every 30 min times 2, and then hourly times 2, return to q4 hour blood pressures.    Funmilayo Rogers MD

## 2022-01-26 NOTE — PROVIDER NOTIFICATION
"   01/26/22 0340   Provider Notification   Provider Name/Title    Method of Notification In Department   Request Evaluate-Remote   Notification Reason Status Update    in the department and updated. Blood pressures have been stable and now changed frequency to every 4 hour blood pressure checks. Reflexes normal with occasional clonus present. Patient still complaining of a \"slight headache\", but using ice-pack with good relief, along with tylenol medication. Patient has been able to ambulate independently to the bathroom without difficulty. Patient has been able to breastfeed sitting upright without complaints of headache. No new orders, continue with plan of care.   "

## 2022-01-26 NOTE — PROGRESS NOTES
Cared for patient from [id number]. Patient was doing well besides having a 6/10 headache around 2100. VSS stable at 2000 and 2100 and relfexes at 2100 +2 with no clonus noted. Around 2200, patient's sister called RN to room and patient reported headache was still there. Was able to answer questions. RN left the room briefly to get ibuprofen and several other things for patient and when RN returned, patient was clutching head and shaking slightly. RN asked patient to state her name but patient only able to open eyes and look at RN when asked. Mostly unresponsive at that time. Called charge nurse and L&D nurses and patient was given calcium gluconate. MD came to see patient at bedside. Transferred to L&D and report given to FABI Marie

## 2022-01-26 NOTE — PROVIDER NOTIFICATION
01/25/22 2259   Provider Notification   Provider Name/Title    Method of Notification At Bedside   Request Evaluate in Person   Notification Reason Status Update    at bedside to evaluate patient and update with lab results. Lab results normal. Patient able to communicate and answer questions at this time.  /76. Patient complaining of HA at this time and her eyes hurt. Patient needed staff assistance to the bathroom which is a change from before. Patient was slightly unstable on her feet and waddled. IV infusing at 125 ml/hr. MD plans to order IV Toradol x 1 for pain and CT scan.

## 2022-01-26 NOTE — PLAN OF CARE
Data: Vital signs within normal limits. Postpartum checks within normal limits - see flow record. Patient eating and drinking normally. Patient able to empty bladder independently and is up ambulating. No apparent signs of infection. Patient performing self cares and is able to care for infant.  Action: Patient medicated during the shift for hypertension. See MAR. Patient stated she was comfortable. Patient education done about monitoring blood pressures at home, when to call a doctor and follow up visits. See flow record.  Response: Positive attachment behaviors observed with infant. Support persons present.   Plan: Discharged today in stable condition.

## 2022-01-26 NOTE — PROVIDER NOTIFICATION
MD at bedside and orders for discharge. Pt denies HA and is feeling well at this time. Will go home on Procardia and a blood pressure cuff. BP this am WDL.

## 2022-01-26 NOTE — DISCHARGE INSTRUCTIONS
Postpartum Vaginal Delivery Instructions    Activity       Ask family and friends for help when you need it.    Do not place anything in your vagina for 6 weeks.    You are not restricted on other activities, but take it easy for a few weeks to allow your body to recover from delivery.  You are able to do any activities you feel up to that point.    No driving until you have stopped taking your pain medications (usually two weeks after delivery).     Call your health care provider if you have any of these symptoms:       Increased pain, swelling, redness, or fluid around your stiches from an episiotomy or perineal tear.    A fever above 100.4 F (38 C) with or without chills when placing a thermometer under your tongue.    You soak a sanitary pad with blood within 1 hour, or you see blood clots larger than a golf ball.    Bleeding that lasts more than 6 weeks.    Vaginal discharge that smells bad.    Severe pain, cramping or tenderness in your lower belly area.    A need to urinate more frequently (use the toilet more often), more urgently (use the toilet very quickly), or it burns when you urinate.    Nausea and vomiting.    Redness, swelling or pain around a vein in your leg.    Problems breastfeeding or a red or painful area on your breast.    Chest pain and cough or are gasping for air.    Problems coping with sadness, anxiety, or depression.  If you have any concerns about hurting yourself or the baby, call your provider immediately.     You have questions or concerns after you return home.     Keep your hands clean:  Always wash your hands before touching your perineal area and stitches.  This helps reduce your risk of infection.  If your hands aren't dirty, you may use an alcohol hand-rub to clean your hands. Keep your nails clean and short.        Take your blood pressure 3 times a day, in the morning, afternoon and evening. If the top number is ever 160 or greater, or the bottom number is ever 110 or  greater, call your clinic. Also call your clinic if you have an intense headache that wont go away with rest and Tylenol.     Make an appointment to be seen in the clinic in one week. Also make sure you are taking your blood pressure medication ar home.

## 2022-01-26 NOTE — PROVIDER NOTIFICATION
01/26/22 0000   Provider Notification   Provider Name/Title    Method of Notification In Department   Request Evaluate-Remote   Notification Reason Status Update    at bedside to discuss POC tonight. CT scan negative. Plan to check 2 more 30 min BP's then hourly x 2 then may return to every 4 hours blood pressure checks as long as not severe range. Patient's sister at bedside and very supportive. Patient's HA improved with IV Toradol and may resume back to ibuprofen per order. Plan to keep magnesium sulfate off at this time unless clinical symptoms or blood pressures would become severe range. May saline lock IV at this time.

## 2022-01-26 NOTE — DISCHARGE SUMMARY
Discharge Summary        January 26, 2022     Olimpia Herrera MRN# [id number]   YOB: 1990 Age: 31 year old     Date of Admission:  1/23/2022  Date of Discharge:  1/26/2022  Admitting Physician:  Tanisha Oviedo MD  Discharge Physician:             Sebastian Carroll MD  Discharging Service:  Obstetrics & Gynecology    Home clinic: [facility]  Primary Provider: Tanisha Oviedo          Admission Diagnoses:   Indication for care in labor or delivery [O75.9]          Discharge Diagnosis:     Patient Active Problem List   Diagnosis     Vaginal delivery     Hypertension in pregnancy, preeclampsia, severe, delivered                Discharge Disposition:      Discharged to home           Condition on Discharge:     Discharge condition: Stable    Discharge vitals: Blood pressure (!) 142/84, pulse 75, temperature 98.7  F (37.1  C), temperature source Oral, resp. rate 16, last menstrual period 04/25/2021, SpO2 99 %, unknown if currently breastfeeding.   Code status on discharge: Full Code           Procedures / Labs / Imaging:   Procedures:   All laboratory data reviewed.  Imaging: N/A          Medications Prior to Admission:     Medications Prior to Admission   Medication Sig Dispense Refill Last Dose     omeprazole (PRILOSEC) 40 MG DR capsule Take 1 capsule (40 mg) by mouth daily 90 capsule 3 Past Week at Unknown time     Prenatal Vit-Fe Fumarate-FA (PRENATAL VITAMINS PO)    1/22/2022 at Unknown time             Discharge Medications:     Current Discharge Medication List      START taking these medications    Details   ibuprofen (ADVIL/MOTRIN) 600 MG tablet Take 1 tablet (600 mg) by mouth every 6 hours as needed for mild pain or moderate pain Take w/ food  Qty: 30 tablet, Refills: 0    Associated Diagnoses: Vaginal delivery      NIFEdipine ER OSMOTIC (ADALAT CC) 30 MG 24 hr tablet Take 1 tablet (30 mg) by mouth daily  Qty: 50 tablet, Refills: 0    Associated Diagnoses:  Hypertension in pregnancy, preeclampsia, severe, delivered         CONTINUE these medications which have NOT CHANGED    Details   omeprazole (PRILOSEC) 40 MG DR capsule Take 1 capsule (40 mg) by mouth daily  Qty: 90 capsule, Refills: 3    Associated Diagnoses: Gastroesophageal reflux disease with esophagitis without hemorrhage      Prenatal Vit-Fe Fumarate-FA (PRENATAL VITAMINS PO)                    Consultations:     No consultations were requested during this admission             Brief History of Illness:   This patient was a 31 year old pregnant female,  4, para , who presented with intrauterine pregnancy of gestational age  39w0d.          Hospital Course:     Pt was admitted to L&D for elective induction of labor at 39 weeks.  Labor and delivery was uncomplicated on 2022 w/ spontaneous vaginal delivery as detailed in L&D note.  Pt's postop course was complicated by postpartum pre-eclampsia w/ severe features on PPD#2 after previously having labile elevated BPs requiring Hydralazine twice in the first hours after delivery, then interspersed with normal BPs.  On PPD#2, she had 2 severe range BPs 15 minutes apart and was given Nifedipine po and had Pre-eclampsia labs drawn that were normal. She also had a HA at that time. She was started on Mg sulfate.  About 6 hours after Mg started, she developed a change in mental status comprised of decreased responsiveness.  Mg sulfate was stopped as it was thought Mg toxicity was the cause.  A Mg level was 5.3, however.  So a CT Head was done to r/o stroke, etc, but that was normal.  Pt was moved back to L&D from postpartum for closer observation.  She had HA treated with Toradol with good relief, and BPs stayed in the normal to mild range while off Mg sulfate.  She was maintained on Nifedipine ER 30 mg Qday.  On the morning of PPD#3, she felt well, only had minimal HA, no vis changes, and /84.  She was felt stable for discharge with a BP cuff to  check at home.  She will call clinic for severe range BPs or worsening HA.  Pt to f/u 1 week for BP check.        Physical Exam:    O:  Blood pressure (!) 142/84, pulse 75, temperature 98.7  F (37.1  C), temperature source Oral, resp. rate 16, last menstrual period 04/25/2021, SpO2 99 %, unknown if currently breastfeeding.            Intake/Output Summary (Last 24 hours) at 1/26/2022 0754  Last data filed at 1/26/2022 0326  Gross per 24 hour   Intake 2031.08 ml   Output 3450 ml   Net -1418.92 ml           Abdomen - Non-distended, Normoactive bowel sounds, soft, appropriately tender; Fundus firm, at umbilicus, nontender        Dressing/Incision - clean, dry, intact        Extremities - No calf tenderness           Data:  Hemoglobin   Date Value Ref Range Status   01/25/2022 10.6 (L) 11.7 - 15.7 g/dL Final   01/23/2022 9.6 (L) 11.7 - 15.7 g/dL Final   06/30/2021 11.0 (L) 11.7 - 15.7 g/dL Final   06/21/2021 11.9 11.7 - 15.7 g/dL Final     No results found for: RUBELLAABIGG   Lab Results   Component Value Date    ABO A 06/30/2021           Lab Results   Component Value Date    RH Pos 06/30/2021               Discharge Instructions and Follow-Up:     Discharge diet: Regular   Discharge activity: Activity as tolerated   Discharge follow-up: Follow up with OB clinic in 1 week for BP check and 6 weeks PP   Outpatient therapy: None   Home Care agency: None   Supplies and equipment: Breast Pump, BP cuff   Lines and drains: None   Wound care: Keep Dry, Wash with soap and water, protect from sunlight, do not soak in water for 14 days (Swimming, Baths), but may shower.  Please call if wound becomes red, swollen, hot or begins draining pus like fluid.   Other instructions: None     Sebastian Carroll MD

## 2022-01-26 NOTE — PLAN OF CARE
Mag loaded this afternoon at 1615 due to elevated BPs and symptoms of headache, blurred vision, and epigastric pain. Labs okay. Started on Procardia 30mg XR. Plan is to continue on Mag 2gm until rounds tomorrow.  went home to be with older kids and pt's aunt is here as support and to care for baby.

## 2022-01-26 NOTE — PROGRESS NOTES
"Called to come to the bedside for decreased responsiveness.  The patient was up to the bathroom without assistance, had last been assessed by her RN an hour prior and that time reflexes were normal, mental status normal per report. The patient's family member confirms this. While she was in the bathroom, she became \"weak\" and asked for help from her family member, and then they called the nurse. She made it back to bed and when she was assessed by the nurse, she was difficult to rouse, shaking at times but able to answer questions, and noted to have weak  bilaterally.     Because there was concern on the part of nursing for possible magnesium toxicity, mag sulfate infusion was stopped and calcium gluconate was given IV.    On my arrival to the room, her vitals were normal.    General: appears sleepy, rouses to her name. Reports a severe headache that increased in intensity while she was up to the bathroom. States it makes it \"hard to keep her eyes open.\"  Respirations: regular, unlabored.  CV: regular rate and rhythm.  Abdomen: soft, nontender. Fundus firm.  Extremities: 3+ reflexes bilaterally. No significant edema.  Neuro: no focal deficits, states she just \"feels weak\".  There is no evidence of seizure activity and no postictal state.    Recent Labs   Lab 01/25/22  2221 01/25/22  1628 01/23/22  0957   WBC  --  7.2 6.9   HGB  --  10.6* 9.6*   MCV  --  93 94   PLT  --  183 185    138 138   POTASSIUM 3.6 3.6 4.1   CHLORIDE 108 108 108   CO2 24 23 24   BUN 11 14 15   CR 0.79 0.99 0.88   ANIONGAP 8 7 6   DUNG 9.0 9.2 9.1   * 73 64*   ALBUMIN 3.1* 2.9* 2.6*   PROTTOTAL 7.8 7.3 6.5*   BILITOTAL 0.3 0.3 0.2   ALKPHOS 135 124 139   ALT 31 29 32   AST 20 19 19     Magnesium level: 5.3 (therapuetic generally 4-7)    Impression:  Worsening headache and acute mental status change in the setting of postpartum hypertension, currently under adequate control with oral antihypertensives.  Labs within normal " limits.    Plan:  -CT scan now to rule out bleed, intracranial cause for increase in headache with weakness. Reversible cerebral vasoconstriction may need to be ruled out with CTAngio if worsening/changing status, though this is less likely than preeclampsia associated headache alone.  -Consider hospitalist consult if status changes/worsens, other new symptoms develop.  -As long as she is not requiring treatment for severe range blood pressures, or exhibiting other signs and symptoms of worsening postpartum severe preeclampsia, will keep mag off for now. If restarting, will not rebolus.  -Move to L&D for closer monitoring.  -All explained to the patient and her family member in detail, questions answered.    Funmilayo Rogers MD

## 2022-01-26 NOTE — PROVIDER NOTIFICATION
01/25/22 2212   Provider Notification   Provider Name/Title    Method of Notification Phone   Request Evaluate in Person   Notification Reason Status Update;Other   Dr.Dixon called with an update. Patient is currently non-responsive and shaky. Additional staff at bedside, RN PRISCILLA administering calcium gluconate now, magnesium sulfate stopped and ordered STAT magnesium level. MD requesting what the BP is 140/90 and starting to become responsive. Recommend coming to the bedside, MD in route to the unit and will re-evaluate patient situation shortly. TORB by , add CMP to lab draw. .

## 2022-01-27 ENCOUNTER — NURSE TRIAGE (OUTPATIENT)
Dept: NURSING | Facility: CLINIC | Age: 32
End: 2022-01-27

## 2022-01-27 ENCOUNTER — HOSPITAL ENCOUNTER (EMERGENCY)
Facility: CLINIC | Age: 32
Discharge: HOME | End: 2022-01-27
Attending: EMERGENCY MEDICINE | Admitting: EMERGENCY MEDICINE
Payer: COMMERCIAL

## 2022-01-27 ENCOUNTER — PATIENT OUTREACH (OUTPATIENT)
Dept: CARE COORDINATION | Facility: CLINIC | Age: 32
End: 2022-01-27

## 2022-01-27 VITALS
HEART RATE: 62 BPM | RESPIRATION RATE: 20 BRPM | DIASTOLIC BLOOD PRESSURE: 91 MMHG | OXYGEN SATURATION: 100 % | SYSTOLIC BLOOD PRESSURE: 141 MMHG | TEMPERATURE: 98 F

## 2022-01-27 DIAGNOSIS — Z71.89 OTHER SPECIFIED COUNSELING: ICD-10-CM

## 2022-01-27 DIAGNOSIS — I10 HYPERTENSION, UNSPECIFIED TYPE: ICD-10-CM

## 2022-01-27 PROCEDURE — 250N000013 HC RX MED GY IP 250 OP 250 PS 637: Performed by: EMERGENCY MEDICINE

## 2022-01-27 PROCEDURE — 99283 EMERGENCY DEPT VISIT LOW MDM: CPT

## 2022-01-27 RX ORDER — NIFEDIPINE 30 MG/1
30 TABLET, EXTENDED RELEASE ORAL DAILY
Status: DISCONTINUED | OUTPATIENT
Start: 2022-01-27 | End: 2022-01-27 | Stop reason: HOSPADM

## 2022-01-27 RX ORDER — NIFEDIPINE 30 MG/1
30 TABLET, EXTENDED RELEASE ORAL 2 TIMES DAILY
Qty: 30 TABLET | Refills: 0 | Status: SHIPPED | OUTPATIENT
Start: 2022-01-27 | End: 2023-04-06

## 2022-01-27 RX ADMIN — NIFEDIPINE 30 MG: 30 TABLET, FILM COATED, EXTENDED RELEASE ORAL at 00:38

## 2022-01-27 ASSESSMENT — ENCOUNTER SYMPTOMS
DIZZINESS: 1
NECK PAIN: 1

## 2022-01-27 NOTE — DISCHARGE INSTRUCTIONS
Please increase your nifedipine dose to 30 mg twice a day.  As per your OB/GYN.  Return with severe headache chest pain or shortness of breath.  Please follow-up with your OB/GYN or call them if your blood pressure is running high at home.

## 2022-01-27 NOTE — TELEPHONE ENCOUNTER
Patient has delivered baby.  Patient was in hospital for hypertension.  145/101 at 6:15 am.  Last blood pressure was 158/107.    Patient was prescribed medication when leaving the hospital.   is requesting to schedule an appointment to have her seen in clinic today.   states that he needs to  her medication.    COVID 19 Nurse Triage Plan/Patient Instructions    Please be aware that novel coronavirus (COVID-19) may be circulating in the community. If you develop symptoms such as fever, cough, or SOB or if you have concerns about the presence of another infection including coronavirus (COVID-19), please contact your health care provider or visit [email or web address].     Disposition/Instructions    In-Person Visit with provider recommended. Reference Visit Selection Guide.    Thank you for taking steps to prevent the spread of this virus.  o Limit your contact with others.  o Wear a simple mask to cover your cough.  o Wash your hands well and often.    Resources    [facility]: About COVID-19: [email or web address]    CDC: What to Do If You're Sick: www.cdc.gov/coronavirus/2019-ncov/about/steps-when-sick.html    CDC: Ending Home Isolation: www.cdc.gov/coronavirus/2019-ncov/hcp/disposition-in-home-patients.html     CDC: Caring for Someone: www.cdc.gov/coronavirus/2019-ncov/if-you-are-sick/care-for-someone.html     [facility]: Interim Guidance for Hospital Discharge to Home: www.health.[facility].mn.us/diseases/coronavirus/hcp/hospdischarge.pdf    [facility] clinical trials (COVID-19 research studies): clinicalaffairs.[facility]/umn-clinical-trials     Below are the COVID-19 hotlines at the [facility] of Health ([facility]). Interpreters are available.   o For health questions: Call 598-510-0741 or 1-801.441.4710 (7 a.m. to 7 p.m.)  o For questions about schools and childcare: Call 916-881-9323 or 1-252.744.9210 (7 a.m. to 7 p.m.)                         Reason for  Disposition    BP Systolic BP >= 140 OR Diastolic >= 90 and postpartum (from 0 to 6 weeks after delivery)    Additional Information    Negative: Sounds like a life-threatening emergency to the triager    Negative: Pregnant > 20 weeks or postpartum (< 6 weeks after delivery) and new hand or face swelling    Negative: Pregnant > 20 weeks and BP > 140/90    Negative: Systolic BP >= 160 OR Diastolic >= 100, and any cardiac or neurologic symptoms (e.g., chest pain, difficulty breathing, unsteady gait, blurred vision)    Negative: Patient sounds very sick or weak to the triager    Protocols used: HIGH BLOOD PRESSURE-A-[place]

## 2022-01-27 NOTE — ED TRIAGE NOTES
Pt discharged from hospital today after having baby, was told if SBP >160 to come to ED. Pt states she feels dizzy.

## 2022-01-27 NOTE — PROGRESS NOTES
Clinic Care Coordination Contact    Background: Care Coordination referral placed from [facility] discharge report for reason of patient meeting criteria for a TCM outreach call by [facility] team.    Assessment: Upon chart review, [facility] Team member will cancel/close the referral for TCM outreach due to reason below:    Patient has presented to Emergency Department or has been readmitted to hospital    Plan: Care Coordination referral for TCM outreach canceled.    May Cota MA  [facility]

## 2022-01-27 NOTE — ED PROVIDER NOTES
History   Chief Complaint:  Hypertension     The history is provided by the patient.      Olimpia Herrera is a 31 year old female with history of covid, migraine, GERD, and varicella who presents with high blood pressure. The patient recently had her third child and was discharged from the hospital today after a 3 day admission. Upon discharge she was told to continue checking her blood pressure and if it ever quan above 160 then she should present to the ED. Tonight when she checked her blood pressure it was 160/106 which prompted her arrival. Upon my evaluation she notes right sided neck pain and feels dizzy. She denies ever having hypertension before this most recent pregnancy.     Review of Systems   Musculoskeletal: Positive for neck pain.   Neurological: Positive for dizziness.   All other systems reviewed and are negative.      Allergies:  Compazine [Prochlorperazine]  Zantac [Ranitidine]    Medications:  Nifedipine   Omeprazole   Prenatal Vit-Fe Fumarate-FA     Past Medical History:     Anemia  COVID  Eczema, dyshidrotic  Migraine  Varicella  Vaginal delivery  Gastroesophageal reflux disease without esophagitis  Jennifer-Hall tear  Hypertension in pregnancy, preeclampsia, severe, delivered      Past Surgical History:    Appendectomy     Family History:    Hypertension     Social History:  Presents unaccompanied.   PCP: Tanisha Oviedo     Physical Exam     Patient Vitals for the past 24 hrs:   BP Temp Temp src Pulse Resp SpO2   01/27/22 0115 (!) 141/91 -- -- 62 -- 100 %   01/27/22 0100 (!) 144/95 -- -- 61 -- 100 %   01/27/22 0045 (!) 146/91 -- -- 60 -- 100 %   01/27/22 0030 (!) 149/93 -- -- 65 -- 100 %   01/27/22 0006 (!) 154/92 98  F (36.7  C) Temporal 78 20 100 %       Physical Exam  Vitals and nursing note reviewed.   Constitutional:       Comments: Well-appearing white looking at her iPhone   HENT:      Head: Normocephalic.      Right Ear: Tympanic membrane normal.      Left Ear: Tympanic  membrane normal.   Cardiovascular:      Rate and Rhythm: Normal rate and regular rhythm.   Pulmonary:      Effort: Pulmonary effort is normal.      Breath sounds: Normal breath sounds.   Musculoskeletal:         General: Normal range of motion.   Skin:     General: Skin is warm.      Capillary Refill: Capillary refill takes less than 2 seconds.   Neurological:      General: No focal deficit present.      Mental Status: She is alert.   Psychiatric:         Mood and Affect: Mood normal.           Emergency Department Course     Emergency Department Course:       Reviewed:  I reviewed nursing notes, vitals, past medical history and Care Everywhere    Assessments:  0014 I obtained history and examined the patient as noted above.   0022 I rechecked the patient and explained findings.   0125 I rechecked the patient and discharged her home.     Consults:  0018 I consulted with Crissy Mo, regarding the patient's history and presentation here in the emergency department.    Interventions:  0038 Nifedipine, 30 mg, PO    Disposition:  The patient was discharged to home.     Impression & Plan     Medical Decision Making:  Patient presents after being discharged today after delivery with concerns for elevated blood pressure reading at home. Patient describes a vague neck pain and states she is dizzy to walk back with a normal steady gait and is watching her or looking at her iPhone. Care was discussed with OB/GYN did consider preeclampsia postpartum preeclampsia. Patient does not complain of headache Dr. Sebastian Jones from OB/GYN feels she did not manage well with magnesium in the hospital and therefore recommended adjustments of blood pressure medication and discharged home. Patient was offered a second tablet of blood pressure medication to take and asked to follow-up with the clinic and return with severe headache or worsening condition. Patient is in agreement was discharged in stable condition.      Diagnosis:     ICD-10-CM    1. Hypertension, unspecified type  I10        Discharge Medications:  New Prescriptions    NIFEDIPINE ER OSMOTIC (PROCARDIA XL) 30 MG 24 HR TABLET    Take 1 tablet (30 mg) by mouth 2 times daily       Scribe Disclosure:  I, Chanelle Macias, am serving as a scribe at 12:12 AM on 1/27/2022 to document services personally performed by Conrad Terry MD based on my observations and the provider's statements to me.            Conrad Terry MD  01/27/22 0617

## 2022-01-30 ENCOUNTER — NURSE TRIAGE (OUTPATIENT)
Dept: NURSING | Facility: CLINIC | Age: 32
End: 2022-01-30
Payer: COMMERCIAL

## 2022-02-01 ENCOUNTER — TELEPHONE (OUTPATIENT)
Dept: OBGYN | Facility: CLINIC | Age: 32
End: 2022-02-01
Payer: COMMERCIAL

## 2022-02-01 NOTE — TELEPHONE ENCOUNTER
Is she taking the procardia bid? She was discharged home on once a day. Or is she taking it 60 mg once a day (would be a more common way to do this)? Are the other symptoms new (headache, etc)?  I expect that her bps will be on the higher side. Our goal is most values less than 150/100.  She is mostly in that range, it appears.  Please ask her to recheck the blood pressure now after sitting for 15 minutes. If it's higher than 160/110 (either number), we need to see her. Otherwise, send it to me and have her check tid (morning, midday, evening), record all those numbers, and follow up this week with primary OB or anyone who is available.    Funmilayo Rogers MD

## 2022-02-01 NOTE — TELEPHONE ENCOUNTER
Patient calling: for f/u ER appt.   22.  Breast feeding.  Was seen in ER on 22 for elevated blood pressure.  When she checked this am. /103. (has only taken x 1 today)  22 ,143/93 , 155/97, 142/99  Has been running high since ER visit.  Was started on Procardia XL 30 mg BID.    Headache, shoulder pain, vision blurry.  Ibuprofen does help relieve the pain.  Denies any swelling of face, hands, feet.  Lower abdominal discomfort.    Please advise.    Martha Vinson RN

## 2022-02-01 NOTE — TELEPHONE ENCOUNTER
Call made to pt.  /97 after rest.  Scheduled appt on 2/3/22 @ 1000 with Dr Rice.  Advised to continue to monitor BP and call for any concerns.  Martha Vinson RN

## 2022-02-01 NOTE — TELEPHONE ENCOUNTER
She is taking Procardia XL 30 mg am and pm  (states ER told her to take the med that way)    Headache and other symptom started in the Hospital which is why she was in the hospital longer.    Informed of message below.  She will call back with BP reading today.  Martha Vinson RN

## 2022-02-03 ENCOUNTER — OFFICE VISIT (OUTPATIENT)
Dept: OBGYN | Facility: CLINIC | Age: 32
End: 2022-02-03
Payer: COMMERCIAL

## 2022-02-03 VITALS
WEIGHT: 157.6 LBS | HEART RATE: 76 BPM | SYSTOLIC BLOOD PRESSURE: 138 MMHG | BODY MASS INDEX: 28.83 KG/M2 | DIASTOLIC BLOOD PRESSURE: 94 MMHG

## 2022-02-03 PROCEDURE — 99213 OFFICE O/P EST LOW 20 MIN: CPT | Performed by: OBSTETRICS & GYNECOLOGY

## 2022-02-03 NOTE — PROGRESS NOTES
SUBJECTIVE:  Olimpia Herrera,  is here for a postpartum visit and BP check.  She had an induced  on 22 delivering a healthy baby girl at term.  She developed PP pre eclampsia    HPI:         Hospital Course:      Pt was admitted to L&D for elective induction of labor at 39 weeks.  Labor and delivery was uncomplicated on 2022 w/ spontaneous vaginal delivery as detailed in L&D note.  Pt's postop course was complicated by postpartum pre-eclampsia w/ severe features on PPD#2 after previously having labile elevated BPs requiring Hydralazine twice in the first hours after delivery, then interspersed with normal BPs.  On PPD#2, she had 2 severe range BPs 15 minutes apart and was given Nifedipine po and had Pre-eclampsia labs drawn that were normal. She also had a HA at that time. She was started on Mg sulfate.  About 6 hours after Mg started, she developed a change in mental status comprised of decreased responsiveness.  Mg sulfate was stopped as it was thought Mg toxicity was the cause.  A Mg level was 5.3, however.  So a CT Head was done to r/o stroke, etc, but that was normal.  Pt was moved back to L&D from postpartum for closer observation.  She had HA treated with Toradol with good relief, and BPs stayed in the normal to mild range while off Mg sulfate.  She was maintained on Nifedipine ER 30 mg Qday.  On the morning of PPD#3, she felt well, only had minimal HA, no vis changes, and /84.  She was felt stable for discharge with a BP cuff to check at home.  She will call clinic for severe range BPs or worsening HA.  Pt to f/u 1 week for BP check.        Following discharge she was evluated in the ED at [facility] on 22 0115 (!) 141/91 -- -- 62 -- 100 %   22 0100 (!) 144/95 -- -- 61 -- 100 %   22 0045 (!) 146/91 -- -- 60 -- 100 %   22 0030 (!) 149/93 -- -- 65 -- 100 %   22 0006 (!) 154/92 98  F (36.7  C) Temporal 78 20 100 %      Her Nifedipine was increased to  BID    She presents today for a BP check.  BPs at home reviewed and most mild range on the nifedipine.    Weight today 157lbs and was 151 at NOB so has diuresed nicely    Last PHQ-9 score on record=   PHQ-9 SCORE 11/21/2018   PHQ-9 Total Score 3     No flowsheet data found.    Delivery complications:  No  Breast feeding:  Yes,   Bladder problems:  No  Bowel problems/hemorrhoids:  No  Episiotomy/laceration/incision healed? N/A  Vaginal flow:  yes  [place]:  No  Contraception: not sexually active  Emotional adjustment:  tired  Back to work:      12 point review of systems negative other than symptoms noted below.    OBJECTIVE:  Vitals: BP (!) 138/94 (BP Location: Left arm, Patient Position: Sitting, Cuff Size: Adult Regular)   Pulse 76   Wt 71.5 kg (157 lb 9.6 oz)   Breastfeeding Yes   BMI 28.83 kg/m    BMI= Body mass index is 28.83 kg/m .  General - pleasant female in no acute distress.  Breast -  deferred  Abdomen - No incision  Ext NT w/o edema, normal reflexes, no clonus    ASSESSMENT:  PP pre eclampsia which seems to be resolving as evidenced by stable/ slightly improved BPs and marked diuresis    PLAN:  RTC 4 weeks for PP visit  Continue Nifedipine 30mg XL daily    Kirk Rice MD

## 2022-02-03 NOTE — NURSING NOTE
"Chief Complaint   Patient presents with     ER F/U   c/o continued headache     initial BP (!) 138/94 (BP Location: Left arm, Patient Position: Sitting, Cuff Size: Adult Regular)   Pulse 76   Wt 71.5 kg (157 lb 9.6 oz)   Breastfeeding Yes   BMI 28.83 kg/m   Estimated body mass index is 28.83 kg/m  as calculated from the following:    Height as of 1/13/22: 1.575 m (5' 2\").    Weight as of this encounter: 71.5 kg (157 lb 9.6 oz).  BP completed using cuff size regular.  Lilian Kim CMA    "

## 2022-02-23 ENCOUNTER — MEDICAL CORRESPONDENCE (OUTPATIENT)
Dept: HEALTH INFORMATION MANAGEMENT | Facility: CLINIC | Age: 32
End: 2022-02-23
Payer: COMMERCIAL

## 2022-03-10 ENCOUNTER — PRENATAL OFFICE VISIT (OUTPATIENT)
Dept: OBGYN | Facility: CLINIC | Age: 32
End: 2022-03-10
Payer: COMMERCIAL

## 2022-03-10 VITALS — DIASTOLIC BLOOD PRESSURE: 80 MMHG | BODY MASS INDEX: 29.63 KG/M2 | WEIGHT: 162 LBS | SYSTOLIC BLOOD PRESSURE: 126 MMHG

## 2022-03-10 PROCEDURE — 99207 PR POST PARTUM EXAM: CPT | Performed by: OBSTETRICS & GYNECOLOGY

## 2022-03-10 ASSESSMENT — PATIENT HEALTH QUESTIONNAIRE - PHQ9: SUM OF ALL RESPONSES TO PHQ QUESTIONS 1-9: 0

## 2022-03-10 NOTE — PROGRESS NOTES
Postpartum visit    Doing well.   S/p  as of 22. No perineal or periurethral lacerations sustained at time of delivery.   Denies vaginal bleeding.   Reports normal bladder and bowel functions.   Denies pain.   Denies depression, anxiety and psychosis.   Contemplating IUD for contraception, but does not know which kind to do.   Breast feeding.     /80   Wt 73.5 kg (162 lb)   LMP 2022   BMI 29.63 kg/m      General Appearance: NAD    A/P: Postpartum visit. No concerns     Contraception: Counseled on hormonal vs non-hormonal IUD options, along with benefits, risks, bleeding precautions and expectations; patient elects to think about this more and discuss with her  and will return to have it inserted when she makes a decision on which type    Breast feeding    Return for annual pelvic exam or PRN      Tanisha Oviedo MD  [facility]

## 2022-03-10 NOTE — NURSING NOTE
"Chief Complaint   Patient presents with     Prenatal Care       Initial /80   Wt 73.5 kg (162 lb)   LMP 2022   BMI 29.63 kg/m   Estimated body mass index is 29.63 kg/m  as calculated from the following:    Height as of 22: 1.575 m (5' 2\").    Weight as of this encounter: 73.5 kg (162 lb).  BP completed using cuff size: regular    Questioned patient about current smoking habits.  Pt. has never smoked.          The following HM Due: NONE      The following patient reported/Care Every where data was sent to:  P ABSTRACT QUALITY INITIATIVES [[id number]]        Amy Mcghee, [facility]                 "

## 2022-03-23 ENCOUNTER — MEDICAL CORRESPONDENCE (OUTPATIENT)
Dept: HEALTH INFORMATION MANAGEMENT | Facility: CLINIC | Age: 32
End: 2022-03-23
Payer: COMMERCIAL

## 2022-05-23 ENCOUNTER — MEDICAL CORRESPONDENCE (OUTPATIENT)
Dept: HEALTH INFORMATION MANAGEMENT | Facility: CLINIC | Age: 32
End: 2022-05-23
Payer: COMMERCIAL

## 2022-06-01 ENCOUNTER — TELEPHONE (OUTPATIENT)
Dept: OBGYN | Facility: CLINIC | Age: 32
End: 2022-06-01
Payer: COMMERCIAL

## 2022-06-01 NOTE — TELEPHONE ENCOUNTER
Pt calling with severe headaches and elevated BP at home.    Delivered in January.  PP visit completed and BP was normotensive.    Advised pt this is far enough out from delivery it is not related to her pre-eclampsia.  She should see a PCP for this.  Gave scheduling number.    Advised if headache is not resolved with Tylenol/Ibuprofen, caffeine/ice she should go to Urgent care/ER for evaluation if she is not able to get in with a PCP.    Azucena Alves RN

## 2022-06-10 ENCOUNTER — OFFICE VISIT (OUTPATIENT)
Dept: INTERNAL MEDICINE | Facility: CLINIC | Age: 32
End: 2022-06-10
Payer: COMMERCIAL

## 2022-06-10 VITALS
OXYGEN SATURATION: 99 % | DIASTOLIC BLOOD PRESSURE: 71 MMHG | SYSTOLIC BLOOD PRESSURE: 111 MMHG | WEIGHT: 161.2 LBS | BODY MASS INDEX: 29.66 KG/M2 | HEIGHT: 62 IN | RESPIRATION RATE: 18 BRPM | HEART RATE: 62 BPM | TEMPERATURE: 97.4 F

## 2022-06-10 DIAGNOSIS — I10 ESSENTIAL HYPERTENSION: Primary | ICD-10-CM

## 2022-06-10 DIAGNOSIS — R42 DIZZINESS: ICD-10-CM

## 2022-06-10 PROCEDURE — 99213 OFFICE O/P EST LOW 20 MIN: CPT | Performed by: INTERNAL MEDICINE

## 2022-06-10 RX ORDER — METOPROLOL SUCCINATE 50 MG/1
50 TABLET, EXTENDED RELEASE ORAL DAILY
Qty: 30 TABLET | Refills: 2 | Status: SHIPPED | OUTPATIENT
Start: 2022-06-10 | End: 2022-12-07

## 2022-06-10 NOTE — PROGRESS NOTES
"  Assessment & Plan   (I10) Essential hypertension  (primary encounter diagnosis)  Comment: Reduce dose of nifedipine, begin metoprolol succinate once daily.   Plan: metoprolol succinate ER (TOPROL XL) 50 MG 24 hr        tablet          (R42) Dizziness  Comment: Patient suspects that Nifedipine is contributing. We agreed to reduce dose before complete discontinuation to assure good BP control is maintained. See pt instructions and epic orders.        BMI:   Estimated body mass index is 29.48 kg/m  as calculated from the following:    Height as of this encounter: 1.575 m (5' 2\").    Weight as of this encounter: 73.1 kg (161 lb 3.2 oz).       Patient Instructions   We are not certain whether the Procardia (Nifedipine) is causing the dizziness.     Let's try reducing the Procardia 30 mg capsules, from two per day, down to one per day.   Also, let's try adding a starting dose of Metoprolol XL 50 mg at ONE per day.     Therefore, take Nifepine 30 mg once daily, and  Metoprolol 50 mg once daily. You may take them at different times.      See me back in the office in about two months to see how your blood pressure looks and how the dizziness symptoms are doing.       No follow-ups on file.    Lex Youssef MD,   [facility] PARVIZ Doan is a 31 year old who presents for the following health issues   Patient is being seen to discuss dizziness and shoulder pain.  HPI     The patient was noted by the  to report dizziness and shoulder pain, perhaps urinary changes.   She denies problems with pain or urinary changes to me today.     She does report some chronic dizziness. This has been reported in her records before and after recent adjustments in her antihypertensive medications.     She had presented to [facility] ED on 1/27 with dizziness and elevated BP readings just a few days post-partum.   She was started on Nifedipine at 30 mg once daily. Due to continued BP elevations " "the dose was raised to 30 mg twice daily.   She does feel as though this medication contributes to a sense of worsening dizziness. No near-syncope, no palpitations.     We discussed trying to make adjustments in medication regimen to see if dizziness symptoms improve.   We agreed to reduce rather than discontinue her dose of Nifedipine. She is breast-feeding, and we reviewed that beta-blockers as well as calcium blockers are among the safest BP medications in this context.     Past medical, family and social histories as well as medications reviewed and updated as needed.    Review of Systems   REVIEW OF SYSTEMS: The following systems have been completely reviewed and are negative except as noted above:   Constitutional, respiratory, cardiovascular, musculoskeletal, and neurologic systems.        Objective    Vitals: /71   Pulse 62   Temp 97.4  F (36.3  C) (Tympanic)   Resp 18   Ht 1.575 m (5' 2\")   Wt 73.1 kg (161 lb 3.2 oz)   SpO2 99%   BMI 29.48 kg/m    BMI= Body mass index is 29.48 kg/m .     Physical Exam   GENERAL: healthy, alert and no distress  RESP: lungs clear to auscultation - no rales, rhonchi or wheezes  CV: regular rate and rhythm, normal S1 S2, no S3 or S4, no murmur, click or rub, no peripheral edema and peripheral pulses strong  NEURO: Normal strength and tone, mentation intact and speech normal  PSYCH: mentation appears normal, affect normal/bright        "

## 2022-06-10 NOTE — PATIENT INSTRUCTIONS
We are not certain whether the Procardia (Nifedipine) is causing the dizziness.     Let's try reducing the Procardia 30 mg capsules, from two per day, down to one per day.   Also, let's try adding a starting dose of Metoprolol XL 50 mg at ONE per day.     Therefore, take Nifepine 30 mg once daily, and  Metoprolol 50 mg once daily. You may take them at different times.      See me back in the office in about two months to see how your blood pressure looks and how the dizziness symptoms are doing.

## 2022-07-05 ENCOUNTER — IMMUNIZATION (OUTPATIENT)
Dept: FAMILY MEDICINE | Facility: CLINIC | Age: 32
End: 2022-07-05
Payer: COMMERCIAL

## 2022-07-05 DIAGNOSIS — Z23 NEED FOR COVID-19 VACCINE: Primary | ICD-10-CM

## 2022-07-05 PROCEDURE — 0051A COVID-19,PF,PFIZER (12+ YRS): CPT

## 2022-07-05 PROCEDURE — 99207 PR NO CHARGE NURSE ONLY: CPT

## 2022-07-05 PROCEDURE — 91305 COVID-19,PF,PFIZER (12+ YRS): CPT

## 2022-08-01 ENCOUNTER — IMMUNIZATION (OUTPATIENT)
Dept: FAMILY MEDICINE | Facility: CLINIC | Age: 32
End: 2022-08-01
Attending: FAMILY MEDICINE
Payer: COMMERCIAL

## 2022-08-01 DIAGNOSIS — Z23 HIGH PRIORITY FOR 2019-NCOV VACCINE: Primary | ICD-10-CM

## 2022-08-01 PROCEDURE — 0052A COVID-19,PF,PFIZER (12+ YRS): CPT

## 2022-08-01 PROCEDURE — 91305 COVID-19,PF,PFIZER (12+ YRS): CPT

## 2022-09-10 ENCOUNTER — HEALTH MAINTENANCE LETTER (OUTPATIENT)
Age: 32
End: 2022-09-10

## 2022-12-07 ENCOUNTER — OFFICE VISIT (OUTPATIENT)
Dept: INTERNAL MEDICINE | Facility: CLINIC | Age: 32
End: 2022-12-07
Payer: COMMERCIAL

## 2022-12-07 VITALS
WEIGHT: 154 LBS | TEMPERATURE: 98.2 F | DIASTOLIC BLOOD PRESSURE: 64 MMHG | HEART RATE: 79 BPM | SYSTOLIC BLOOD PRESSURE: 108 MMHG | OXYGEN SATURATION: 100 % | RESPIRATION RATE: 14 BRPM | BODY MASS INDEX: 26.29 KG/M2 | HEIGHT: 64 IN

## 2022-12-07 DIAGNOSIS — K21.00 GASTROESOPHAGEAL REFLUX DISEASE WITH ESOPHAGITIS WITHOUT HEMORRHAGE: ICD-10-CM

## 2022-12-07 DIAGNOSIS — I10 ESSENTIAL HYPERTENSION: ICD-10-CM

## 2022-12-07 PROCEDURE — 99207 PR NO CHARGE LOS: CPT | Performed by: INTERNAL MEDICINE

## 2022-12-07 PROCEDURE — 90471 IMMUNIZATION ADMIN: CPT | Performed by: INTERNAL MEDICINE

## 2022-12-07 PROCEDURE — 90686 IIV4 VACC NO PRSV 0.5 ML IM: CPT | Performed by: INTERNAL MEDICINE

## 2022-12-07 RX ORDER — OMEPRAZOLE 40 MG/1
40 CAPSULE, DELAYED RELEASE ORAL DAILY
Qty: 90 CAPSULE | Refills: 3 | Status: SHIPPED | OUTPATIENT
Start: 2022-12-07 | End: 2024-03-05

## 2022-12-07 RX ORDER — METOPROLOL SUCCINATE 50 MG/1
50 TABLET, EXTENDED RELEASE ORAL DAILY
Qty: 30 TABLET | Refills: 2 | Status: SHIPPED | OUTPATIENT
Start: 2022-12-07 | End: 2023-05-04

## 2022-12-07 ASSESSMENT — ENCOUNTER SYMPTOMS
NAUSEA: 0
PALPITATIONS: 0
COUGH: 0
ARTHRALGIAS: 0
MYALGIAS: 0
WEAKNESS: 0
DIARRHEA: 0
ABDOMINAL PAIN: 0
HEADACHES: 0
HEMATURIA: 0
JOINT SWELLING: 0
HEMATOCHEZIA: 0
FEVER: 0
SHORTNESS OF BREATH: 0
CONSTIPATION: 0
HEARTBURN: 1
DYSURIA: 0
SORE THROAT: 0
BREAST MASS: 0
DIZZINESS: 0
PARESTHESIAS: 0
EYE PAIN: 0
FREQUENCY: 0
NERVOUS/ANXIOUS: 0
CHILLS: 0

## 2022-12-07 NOTE — PROGRESS NOTES
SUBJECTIVE:   Patient left before seen by physician.      C: Olimpia is an 32 year old who presents for preventive health visit.   Patient has been advised of split billing requirements and indicates understanding: Yes  Healthy Habits:     Getting at least 3 servings of Calcium per day:  Yes    Bi-annual eye exam:  NO    Dental care twice a year:  Yes    Sleep apnea or symptoms of sleep apnea:  Daytime drowsiness    Diet:  Low salt, Low fat/cholesterol and Other    Frequency of exercise:  1 day/week    Duration of exercise:  Less than 15 minutes    Taking medications regularly:  Yes    Medication side effects:  Other    PHQ-2 Total Score: 0    Additional concerns today:  No              Today's PHQ-2 Score:   PHQ-2 ( 1999 [employer]) 12/7/2022   Q1: Little interest or pleasure in doing things 0   Q2: Feeling down, depressed or hopeless 0   PHQ-2 Score 0   PHQ-2 Total Score (12-17 Years)- Positive if 3 or more points; Administer PHQ-A if positive -   Q1: Little interest or pleasure in doing things Not at all   Q2: Feeling down, depressed or hopeless Not at all   PHQ-2 Score 0           Social History     Tobacco Use     Smoking status: Never     Smokeless tobacco: Never   Substance Use Topics     Alcohol use: No     Alcohol/week: 0.0 standard drinks     If you drink alcohol do you typically have >3 drinks per day or >7 drinks per week? No    Alcohol Use 12/7/2022   Prescreen: >3 drinks/day or >7 drinks/week? No       Reviewed orders with patient.  Reviewed health maintenance and updated orders accordingly -       Breast Cancer Screening:    Breast CA Risk Assessment (FHS-7) 12/7/2022   Do you have a family history of breast, colon, or ovarian cancer? No / Unknown           Pertinent mammograms are reviewed under the imaging tab.    History of abnormal Pap smear:   PAP / HPV Latest Ref Rng & Units 11/21/2018 1/22/2016 8/10/2015   PAP - - - Negative for squamous intraepithelial lesion or malignancy  Electronically signed  "by Chanelle Munguia, CT (ASCP) on 8/20/2015 at  3:54 PM     PAP (Historical) - NIL NIL -   HPV16 NEG:Negative Negative - -   HPV18 NEG:Negative Negative - -   HRHPV NEG:Negative Negative - -     Reviewed and updated as needed this visit by clinical staff   Tobacco  Allergies  Meds              Reviewed and updated as needed this visit by Provider                     Review of Systems   Constitutional: Negative for chills and fever.   HENT: Negative for congestion, ear pain, hearing loss and sore throat.    Eyes: Negative for pain and visual disturbance.   Respiratory: Negative for cough and shortness of breath.    Cardiovascular: Negative for chest pain, palpitations and peripheral edema.   Gastrointestinal: Positive for heartburn. Negative for abdominal pain, constipation, diarrhea, hematochezia and nausea.   Breasts:  Negative for tenderness, breast mass and discharge.   Genitourinary: Negative for dysuria, frequency, genital sores, hematuria, pelvic pain, urgency, vaginal bleeding and vaginal discharge.   Musculoskeletal: Negative for arthralgias, joint swelling and myalgias.   Skin: Negative for rash.   Neurological: Negative for dizziness, weakness, headaches and paresthesias.   Psychiatric/Behavioral: Negative for mood changes. The patient is not nervous/anxious.           OBJECTIVE:   /64 (BP Location: Right arm, Patient Position: Sitting, Cuff Size: Adult Large)   Pulse 79   Temp 98.2  F (36.8  C) (Tympanic)   Resp 14   Ht 1.635 m (5' 4.37\")   Wt 69.9 kg (154 lb)   LMP 12/05/2022 (Exact Date)   SpO2 100%   Breastfeeding Yes   BMI 26.13 kg/m    Physical Exam          ASSESSMENT/PLAN:             COUNSELING:        BMI:   Estimated body mass index is 26.13 kg/m  as calculated from the following:    Height as of this encounter: 1.635 m (5' 4.37\").    Weight as of this encounter: 69.9 kg (154 lb).         She reports that she has never smoked. She has never used smokeless " tobacco.      Lex Youssef MD, MD  [facility]

## 2023-01-31 NOTE — PATIENT INSTRUCTIONS
"Return weekly   Return to clinic:  every 4 weeks till 28 weeks, then every 2 weeks till 36 weeks, then weekly till delivery      Phone numbers [place]:  Day/ night [phone] ask for ob triage  Emergency:  Call labor and delivery:  [phone]    What should I call about??    Contraction every 5 minutes for 1 hour 1 minute long (511), bleeding, loss of fluid, headache that doesn't resolve with tylenol, and decreased fetal movement     Start kick counts @ 26-28 weeks   There is an lucy for this!  It is called \"count the kicks\"  Keep track of movement and discover your normal baby movement pattern   guideline is listed below  Please call if you do not feel the baby move!  We will have you come in for fetal heart rate monitoring:   Perception of at least 10 FMs during 12 hours of normal maternal activity   Perception of least 10 FMs over two hours when the mother is at rest and focused on [facility] Address   [address], [place], [place] [address] [phone]    Dr. Khushbu Avalos, DO    OB/GYN   [facility]                                                      " Treatment Number (Optional): 1 Anesthesia Type: 1% lidocaine with epinephrine Detail Level: Zone Pre-Procedures Photographs: Yes Treatment Number (Optional): 2 Total Lines (Use Numbers Only, No Special Characters Or $): 7892 Post-Care Instructions: Physical sunscreen applied post treatment.\\nI reviewed with the patient in detail post-care instructions. Use Distraction Techniques In Note: No Consents obtained, risks reviewed. Anesthesia Volume In Cc: 0

## 2023-03-27 ENCOUNTER — APPOINTMENT (OUTPATIENT)
Dept: GENERAL RADIOLOGY | Facility: CLINIC | Age: 33
End: 2023-03-27
Attending: EMERGENCY MEDICINE
Payer: COMMERCIAL

## 2023-03-27 ENCOUNTER — HOSPITAL ENCOUNTER (EMERGENCY)
Facility: CLINIC | Age: 33
Discharge: HOME | End: 2023-03-27
Attending: INTERNAL MEDICINE | Admitting: INTERNAL MEDICINE
Payer: COMMERCIAL

## 2023-03-27 VITALS
RESPIRATION RATE: 16 BRPM | SYSTOLIC BLOOD PRESSURE: 104 MMHG | HEART RATE: 56 BPM | TEMPERATURE: 98.9 F | DIASTOLIC BLOOD PRESSURE: 72 MMHG | OXYGEN SATURATION: 100 %

## 2023-03-27 DIAGNOSIS — S92.252A CLOSED DISPLACED FRACTURE OF NAVICULAR BONE OF LEFT FOOT, INITIAL ENCOUNTER: ICD-10-CM

## 2023-03-27 PROCEDURE — 28450 TX TARSAL B1 FX W/O MNPJ EA: CPT

## 2023-03-27 PROCEDURE — 99283 EMERGENCY DEPT VISIT LOW MDM: CPT

## 2023-03-27 PROCEDURE — 250N000013 HC RX MED GY IP 250 OP 250 PS 637: Performed by: INTERNAL MEDICINE

## 2023-03-27 PROCEDURE — 73630 X-RAY EXAM OF FOOT: CPT | Mod: LT

## 2023-03-27 RX ORDER — IBUPROFEN 600 MG/1
600 TABLET, FILM COATED ORAL ONCE
Status: COMPLETED | OUTPATIENT
Start: 2023-03-27 | End: 2023-03-27

## 2023-03-27 NOTE — ED PROVIDER NOTES
History     Chief Complaint:  Foot Injury       HPI   Olimpia Herrera is a 32 year old female who presents with a left foot injury. The patient states that her son was playing in the house, and fell down directly on her foot. Her child is 6 years old. She endorses pain through the lateral aspect of her left foot, also complaining on decreased sensation in her pinky toe.    Independent Historian:   None - Patient Only    Review of External Notes:   none    ROS:  Review of Systems   Musculoskeletal:        Foot pain       Allergies:  Compazine [Prochlorperazine]  Zantac [Ranitidine]     Medications:    metoprolol succinate ER   Nifedipine ER   omeprazole    Past Medical History:    Anemia  COVID-19  Eczema   Migraines  Varicella     Past Surgical History:    Appendectomy     Family History:    Mother - Hypertension     Social History:  The patient presents to the ED with .  PCP: Tanisha Oviedo     Physical Exam     Patient Vitals for the past 24 hrs:   BP Temp Pulse Resp SpO2   03/27/23 0340 134/81 98.9  F (37.2  C) 58 16 99 %        Physical Exam  Cardiovascular:      Pulses: Normal pulses.   Musculoskeletal:        Feet:    Feet:      Left foot:      Skin integrity: Skin integrity normal.      Comments: Mild swelling  Skin:     Findings: No abrasion, ecchymosis or laceration.   Neurological:      Mental Status: She is alert.      Sensory: No sensory deficit.   Psychiatric:         Behavior: Behavior is cooperative.         Emergency Department Course     Imaging:  Foot XR, G/E 3 views, left   Final Result   IMPRESSION: There appears to be a fracture of the medial aspect of the navicular bone with mild displacement of the fracture fragments.         Report per radiology    Laboratory:  Labs Ordered and Resulted from Time of ED Arrival to Time of ED Departure - No data to display     Procedures   Emergency Department Course & Assessments:       Interventions:  Medications - No data to display      Assessments:  0924 I obtained history and examined patient as noted above.   0955 I reassessed patient and explained results.     Independent Interpretation (X-rays, CTs, rhythm strip):  I viewed and printed xray for patient    Consultations/Discussion of Management or Tests:  0933 I consulted with [facility], regarding patient status and plan of care.  0954 I again consulted with [facility], regarding plan of care for patient.  We discussed the possibility of putting patient in a walking boot as opposed to fiberglass cast due to patient having a 1 year old to take care of at home.     Social Determinants of Health affecting care:   Young children at home    Disposition:  The patient was discharged to home.     Impression & Plan      Medical Decision Making:    Olimpia Herrera is a 32 year old female who presents with an injury to the left foot.  X-ray demonstrates a tarsal navicular fracture.  With the patient's young children at home and nonweightbearing status would be very difficult.  [facility] spoke with Dr. Anna, foot and ankle surgeon.  He felt a cam walker boot with weightbearing as tolerated was appropriate with follow-up in 3 weeks.    Diagnosis:    ICD-10-CM    1. Closed displaced fracture of navicular bone of left foot, initial encounter  S92.252A            Discharge Medications:  New Prescriptions    No medications on file        Scribe Disclosure:  LOUISA, Selin Caal, am serving as a scribe at 9:34 AM on 3/27/2023 to document services personally performed by Brionna Baig MD based on my observations and the provider's statements to me.   3/27/2023   Brionna Baig MD [place], Susan Gail, MD  03/27/23 1148

## 2023-03-27 NOTE — LETTER
March 28, 2023      To Whom It May Concern:      Olimpia Herrera was seen in our Emergency Department on 03/27/23.  I expect her condition to improve over 1-3 days.  She may return to work/school when improved.    Sincerely,        Mireya Samuel RN

## 2023-04-04 ENCOUNTER — DOCUMENTATION ONLY (OUTPATIENT)
Dept: EMERGENCY MEDICINE | Facility: CLINIC | Age: 33
End: 2023-04-04
Payer: COMMERCIAL

## 2023-04-04 DIAGNOSIS — Z53.9 ERRONEOUS ENCOUNTER--DISREGARD: ICD-10-CM

## 2023-04-04 DIAGNOSIS — S92.252A CLOSED DISPLACED FRACTURE OF NAVICULAR BONE OF LEFT FOOT, INITIAL ENCOUNTER: Primary | ICD-10-CM

## 2023-04-06 ENCOUNTER — VIRTUAL VISIT (OUTPATIENT)
Dept: FAMILY MEDICINE | Facility: CLINIC | Age: 33
End: 2023-04-06
Payer: COMMERCIAL

## 2023-04-06 DIAGNOSIS — G44.89 HEADACHE SYNDROME: Primary | ICD-10-CM

## 2023-04-06 PROCEDURE — 99214 OFFICE O/P EST MOD 30 MIN: CPT | Mod: VID | Performed by: PHYSICIAN ASSISTANT

## 2023-04-06 RX ORDER — TOPIRAMATE 25 MG/1
25 TABLET, FILM COATED ORAL DAILY
Qty: 60 TABLET | Refills: 1 | Status: SHIPPED | OUTPATIENT
Start: 2023-04-06 | End: 2024-07-26

## 2023-04-06 NOTE — PATIENT INSTRUCTIONS
Start topamax 25 mg daily for one week then increase to 50 mg daily  Schedule follow up with primary care provider in 3-4 weeks.  Return urgently if any change in symptoms like increasing pain, vomiting, fever or other change in symptoms.  You cannot get pregnant on this medication.  Please call or MyChart my office with any questions or concerns.

## 2023-04-06 NOTE — PROGRESS NOTES
"Olimpia is a 32 year old who is being evaluated via a billable video visit.      How would you like to obtain your AVS? MyChart  If the video visit is dropped, the invitation should be resent by: Text to cell phone: [phone]  Will anyone else be joining your video visit? No          Assessment & Plan     Headache syndrome  Sounds more like muscle tension headache than migraine.  Start topamax 25 mg daily after one week increase to 50 mg daily and follow up with primary care within the month  - topiramate (TOPAMAX) 25 MG tablet  Dispense: 60 tablet; Refill: 1      Prescription drug management         BMI:   Estimated body mass index is 26.13 kg/m  as calculated from the following:    Height as of 12/7/22: 1.635 m (5' 4.37\").    Weight as of 12/7/22: 69.9 kg (154 lb).   Weight management plan: Discussed healthy diet and exercise guidelines    Patient Instructions   Start topamax 25 mg daily for one week then increase to 50 mg daily  Schedule follow up with primary care provider in 3-4 weeks.  Return urgently if any change in symptoms like increasing pain, vomiting, fever or other change in symptoms.  You cannot get pregnant on this medication.  Please call or MyChart my office with any questions or concerns.                Barbara Costa PA-C  [facility]    Viji Doan is a 32 year old, presenting for the following health issues:  Headache        4/6/2023     8:50 AM   Additional Questions   Roomed by Alina     History of Present Illness       Headaches:   Since the patient's last clinic visit, headaches are: worsened  The patient is getting headaches:  Everyday  She is not able to do normal daily activities when she has a migraine.  The patient is taking the following rescue/relief medications:  Tylenol   Patient states \"I get some relief\" from the rescue/relief medications.   The patient is taking the following medications to prevent migraines:  No medications to prevent " migraines  In the past 4 weeks, the patient has gone to an Urgent Care or Emergency Room 1 time times due to headaches.    She eats 2-3 servings of fruits and vegetables daily.She consumes 0 sweetened beverage(s) daily.She exercises with enough effort to increase her heart rate 9 or less minutes per day.  She exercises with enough effort to increase her heart rate 3 or less days per week.   She is taking medications regularly.     Reports she has had Headache since pregnant.  Daughter is now one year old.  Have 2 boys and a girl.  No further desires for additional pregnancies. I don't need any birth control.   I am using condoms.  Headache every day for year now.  Headache left temple.  Have it can't open my eyes.  No triggers. Did MRI and reports that they keep giving me medication and not helpful .  On metoprolol for hypertension   Reports Blood pressure is good.  113/93 78 and last week 140/110 because did not sleep good that day.  After pregnancy keep complaining about headache and called and was advised to go to the hospital and doctor gave me med for hypertension and since then haven't been to clinic.  Still has heartburn and taking omeprazole helpful   6 months since seen doctor  Has been to Urgent care for headache   Works in home care  Has missed work because of headache           Review of Systems   Constitutional, HEENT, cardiovascular, pulmonary, gi and gu systems are negative, except as otherwise noted.      Objective           Vitals:  No vitals were obtained today due to virtual visit.    Physical Exam   GENERAL: Healthy, alert and no distress  EYES: Eyes grossly normal to inspection.  No discharge or erythema, or obvious scleral/conjunctival abnormalities.  RESP: No audible wheeze, cough, or visible cyanosis.  No visible retractions or increased work of breathing.    SKIN: Visible skin clear. No significant rash, abnormal pigmentation or lesions.  NEURO: Cranial nerves grossly intact.  Mentation and  speech appropriate for age.  PSYCH: Mentation appears normal, affect normal/bright, judgement and insight intact, normal speech and appearance well-groomed.                Video-Visit Details    Type of service:  Video Visit   Video Start Time: 9:35 AM  Video End Time:9:45 AM    Originating Location (pt. Location): Home    Distant Location (provider location):  Off-site  Platform used for Video Visit: [employer]

## 2023-04-11 ENCOUNTER — DOCUMENTATION ONLY (OUTPATIENT)
Dept: EMERGENCY MEDICINE | Facility: CLINIC | Age: 33
End: 2023-04-11
Payer: COMMERCIAL

## 2023-04-11 DIAGNOSIS — S92.252A CLOSED DISPLACED FRACTURE OF NAVICULAR BONE OF LEFT FOOT, INITIAL ENCOUNTER: Primary | ICD-10-CM

## 2023-04-30 ENCOUNTER — HEALTH MAINTENANCE LETTER (OUTPATIENT)
Age: 33
End: 2023-04-30

## 2023-06-16 ENCOUNTER — OFFICE VISIT (OUTPATIENT)
Dept: FAMILY MEDICINE | Facility: CLINIC | Age: 33
End: 2023-06-16
Payer: COMMERCIAL

## 2023-06-16 VITALS
WEIGHT: 155 LBS | BODY MASS INDEX: 26.46 KG/M2 | SYSTOLIC BLOOD PRESSURE: 122 MMHG | TEMPERATURE: 98.1 F | OXYGEN SATURATION: 99 % | HEIGHT: 64 IN | HEART RATE: 58 BPM | DIASTOLIC BLOOD PRESSURE: 77 MMHG | RESPIRATION RATE: 18 BRPM

## 2023-06-16 DIAGNOSIS — Z00.00 ROUTINE GENERAL MEDICAL EXAMINATION AT A HEALTH CARE FACILITY: Primary | ICD-10-CM

## 2023-06-16 DIAGNOSIS — Z12.4 CERVICAL CANCER SCREENING: ICD-10-CM

## 2023-06-16 DIAGNOSIS — I10 ESSENTIAL HYPERTENSION: ICD-10-CM

## 2023-06-16 LAB
ANION GAP SERPL CALCULATED.3IONS-SCNC: 15 MMOL/L (ref 7–15)
BUN SERPL-MCNC: 15.9 MG/DL (ref 6–20)
CALCIUM SERPL-MCNC: 9.6 MG/DL (ref 8.6–10)
CHLORIDE SERPL-SCNC: 106 MMOL/L (ref 98–107)
CREAT SERPL-MCNC: 0.96 MG/DL (ref 0.51–0.95)
DEPRECATED HCO3 PLAS-SCNC: 20 MMOL/L (ref 22–29)
GFR SERPL CREATININE-BSD FRML MDRD: 79 ML/MIN/1.73M2
GLUCOSE SERPL-MCNC: 87 MG/DL (ref 70–99)
POTASSIUM SERPL-SCNC: 4.3 MMOL/L (ref 3.4–5.3)
SODIUM SERPL-SCNC: 141 MMOL/L (ref 136–145)

## 2023-06-16 PROCEDURE — 36415 COLL VENOUS BLD VENIPUNCTURE: CPT | Performed by: NURSE PRACTITIONER

## 2023-06-16 PROCEDURE — 99213 OFFICE O/P EST LOW 20 MIN: CPT | Mod: 25 | Performed by: NURSE PRACTITIONER

## 2023-06-16 PROCEDURE — G0145 SCR C/V CYTO,THINLAYER,RESCR: HCPCS | Performed by: NURSE PRACTITIONER

## 2023-06-16 PROCEDURE — 99395 PREV VISIT EST AGE 18-39: CPT | Performed by: NURSE PRACTITIONER

## 2023-06-16 PROCEDURE — 87624 HPV HI-RISK TYP POOLED RSLT: CPT | Performed by: NURSE PRACTITIONER

## 2023-06-16 PROCEDURE — 80048 BASIC METABOLIC PNL TOTAL CA: CPT | Performed by: NURSE PRACTITIONER

## 2023-06-16 RX ORDER — METOPROLOL SUCCINATE 50 MG/1
50 TABLET, EXTENDED RELEASE ORAL DAILY
Qty: 90 TABLET | Refills: 3 | Status: SHIPPED | OUTPATIENT
Start: 2023-06-16 | End: 2024-08-12

## 2023-06-16 ASSESSMENT — ENCOUNTER SYMPTOMS
ABDOMINAL PAIN: 1
COUGH: 1

## 2023-06-16 NOTE — PROGRESS NOTES
SUBJECTIVE:   CC: Olimpia is an 32 year old who presents for preventive health visit.       6/16/2023     7:03 AM   Additional Questions   Roomed by arcelia mcniel cma         6/16/2023     7:03 AM   Patient Reported Additional Medications   Patient reports taking the following new medications none     Healthy Habits:    Getting at least 3 servings of Calcium per day:  NO    Bi-annual eye exam:  NO    Dental care twice a year:  Yes    Sleep apnea or symptoms of sleep apnea:  Daytime drowsiness    Diet:  Regular (no restrictions)    Frequency of exercise:  2-3 days/week    Duration of exercise:  Less than 15 minutes    Taking medications regularly:  Yes    Medication side effects:  None    PHQ-2 Total Score:    Additional concerns today:  No        Hypertension Follow-up      Do you check your blood pressure regularly outside of the clinic? Yes     Are you following a low salt diet? Yes    Are your blood pressures ever more than 140 on the top number (systolic) OR more   than 90 on the bottom number (diastolic), for example 140/90? Yes--she mentions SBP can be >140 if she slept poorly otherwise normal; very rare.    BP Readings from Last 6 Encounters:   06/16/23 122/77   03/27/23 104/72   12/07/22 108/64   06/10/22 111/71   03/10/22 126/80   02/03/22 (!) 138/94     No CP, SOB, edema, and palpitations.       Social History     Tobacco Use     Smoking status: Never     Smokeless tobacco: Never   Vaping Use     Vaping status: Never Used     Passive vaping exposure: Yes   Substance Use Topics     Alcohol use: No     Alcohol/week: 0.0 standard drinks of alcohol             6/16/2023     7:02 AM   Alcohol Use   Prescreen: >3 drinks/day or >7 drinks/week? No     Reviewed orders with patient.  Reviewed health maintenance and updated orders accordingly - Yes  Labs reviewed in EPIC  BP Readings from Last 3 Encounters:   06/16/23 122/77   03/27/23 104/72   12/07/22 108/64    Wt Readings from Last 3 Encounters:   06/16/23 70.3  kg (155 lb)   22 69.9 kg (154 lb)   06/10/22 73.1 kg (161 lb 3.2 oz)                  Current Outpatient Medications   Medication Sig Dispense Refill     ibuprofen (ADVIL/MOTRIN) 600 MG tablet Take 1 tablet (600 mg) by mouth every 6 hours as needed for mild pain or moderate pain Take w/ food 30 tablet 0     metoprolol succinate ER (TOPROL XL) 50 MG 24 hr tablet TAKE ONE TABLET BY MOUTH ONE TIME DAILY 30 tablet 0     omeprazole (PRILOSEC) 40 MG DR capsule Take 1 capsule (40 mg) by mouth daily 90 capsule 3     topiramate (TOPAMAX) 25 MG tablet Take 1 tablet (25 mg) by mouth daily For 2 weeks then increase to 50 mg daily 60 tablet 1     Allergies   Allergen Reactions     Compazine [Prochlorperazine] Swelling     Swelling of tongue     Zantac [Ranitidine] Other (See Comments)     IV Zantac caused : Anxiety       Breast Cancer Screenin/7/2022    10:18 AM   Breast CA Risk Assessment (FHS-7)   Do you have a family history of breast, colon, or ovarian cancer? No / Unknown         Patient under 40 years of age: Routine Mammogram Screening not recommended.   Pertinent mammograms are reviewed under the imaging tab.    History of abnormal Pap smear: NO - age 30-65 PAP every 5 years with negative HPV co-testing recommended      Latest Ref Rng & Units 2018     3:31 PM 2018     3:15 PM 2016     1:45 PM   PAP / HPV   PAP (Historical)  NIL    NIL     HPV 16 DNA NEG^Negative  Negative      HPV 18 DNA NEG^Negative  Negative      Other HR HPV NEG^Negative  Negative        Reviewed and updated as needed this visit by clinical staff   Tobacco  Allergies  Meds  Problems  Med Hx  Surg Hx  Fam Hx          Reviewed and updated as needed this visit by Provider   Tobacco  Allergies  Meds  Problems  Med Hx  Surg Hx  Fam Hx         Past Medical History:   Diagnosis Date     Anemia      Constipation      COVID     symptoms 10/26/21, pos test 10/27/21     Eczema, dyshidrotic      Migraine       "Migraines      Varicella       Past Surgical History:   Procedure Laterality Date     APPENDECTOMY         Review of Systems   Respiratory: Positive for cough.    Gastrointestinal: Positive for abdominal pain.          OBJECTIVE:   /77 (BP Location: Right arm, Patient Position: Sitting, Cuff Size: Adult Regular)   Pulse 58   Temp 98.1  F (36.7  C) (Tympanic)   Resp 18   Ht 1.632 m (5' 4.25\")   Wt 70.3 kg (155 lb)   SpO2 99%   Breastfeeding No   BMI 26.40 kg/m    Physical Exam  GENERAL: healthy, alert and no distress  EYES: Eyes grossly normal to inspection, PERRL and conjunctivae and sclerae normal  HENT: ear canals and TM's normal, nose and mouth without ulcers or lesions  NECK: no adenopathy, no asymmetry, masses, or scars and thyroid normal to palpation  RESP: lungs clear to auscultation - no rales, rhonchi or wheezes  BREAST: normal without masses, tenderness or nipple discharge and no palpable axillary masses or adenopathy  CV: regular rate and rhythm, normal S1 S2, no S3 or S4, no murmur, click or rub, no peripheral edema and peripheral pulses strong  ABDOMEN: soft, nontender, no hepatosplenomegaly, no masses and bowel sounds normal   (female): normal female external genitalia, normal urethral meatus, vaginal mucosa pink, moist, well rugated, and normal cervix/adnexa/uterus without masses or discharge  MS: no gross musculoskeletal defects noted, no edema  SKIN: no suspicious lesions or rashes  NEURO: Normal strength and tone, mentation intact and speech normal  PSYCH: mentation appears normal, affect normal/bright    Diagnostic Test Results:  Labs reviewed in Epic    ASSESSMENT/PLAN:   1. Routine general medical examination at a health care facility  Reviewed age appropriate screenings and immunizations.     2. Cervical cancer screening  due  - Pap Screen with HPV - recommended age 30 - 65 years    3. Essential hypertension  Chronic, here today for follow up.  BP's continue to be well " controlled.  Will update a BMP today and have her continue on her current medication.   - BASIC METABOLIC PANEL; Future  - metoprolol succinate ER (TOPROL XL) 50 MG 24 hr tablet; Take 1 tablet (50 mg) by mouth daily  Dispense: 90 tablet; Refill: 3  - OFFICE/OUTPT VISIT,MELITA HOWARD III        COUNSELING:  Reviewed preventive health counseling, as reflected in patient instructions        She reports that she has never smoked. She has never used smokeless tobacco.          CYRUS Arvizu CNP  [facility]

## 2023-06-20 LAB
BKR LAB AP GYN ADEQUACY: NORMAL
BKR LAB AP GYN INTERPRETATION: NORMAL
BKR LAB AP HPV REFLEX: NORMAL
BKR LAB AP PREVIOUS ABNORMAL: NORMAL
PATH REPORT.COMMENTS IMP SPEC: NORMAL
PATH REPORT.COMMENTS IMP SPEC: NORMAL
PATH REPORT.RELEVANT HX SPEC: NORMAL

## 2023-06-21 LAB
HUMAN PAPILLOMA VIRUS 16 DNA: NEGATIVE
HUMAN PAPILLOMA VIRUS 18 DNA: NEGATIVE
HUMAN PAPILLOMA VIRUS FINAL DIAGNOSIS: NORMAL
HUMAN PAPILLOMA VIRUS OTHER HR: NEGATIVE

## 2023-12-01 ENCOUNTER — ANCILLARY PROCEDURE (OUTPATIENT)
Dept: GENERAL RADIOLOGY | Facility: CLINIC | Age: 33
End: 2023-12-01
Attending: FAMILY MEDICINE
Payer: COMMERCIAL

## 2023-12-01 ENCOUNTER — OFFICE VISIT (OUTPATIENT)
Dept: URGENT CARE | Facility: URGENT CARE | Age: 33
End: 2023-12-01
Payer: COMMERCIAL

## 2023-12-01 VITALS
SYSTOLIC BLOOD PRESSURE: 142 MMHG | DIASTOLIC BLOOD PRESSURE: 88 MMHG | WEIGHT: 141 LBS | HEART RATE: 58 BPM | HEIGHT: 62 IN | OXYGEN SATURATION: 100 % | BODY MASS INDEX: 25.95 KG/M2 | TEMPERATURE: 98.6 F

## 2023-12-01 DIAGNOSIS — R76.11 POSITIVE PPD: ICD-10-CM

## 2023-12-01 DIAGNOSIS — R76.11 POSITIVE PPD: Primary | ICD-10-CM

## 2023-12-01 PROCEDURE — 71046 X-RAY EXAM CHEST 2 VIEWS: CPT | Mod: TC | Performed by: RADIOLOGY

## 2023-12-01 PROCEDURE — 99213 OFFICE O/P EST LOW 20 MIN: CPT | Performed by: FAMILY MEDICINE

## 2023-12-01 NOTE — PROGRESS NOTES
"SUBJECTIVE:  Chief Complaint   Patient presents with    Urgent Care     Positive mantoux. Pt was referred to PCP for CXR; no current openings so they told her to come to .      Olimpia Herrera is a 33 year old female who presents with a chief complaint of positive PPD.    Born in [place], has been in [place] for the past 10 years  Had P[place] checked [place] to [place] and was negative.  Did have BCG vaccination when young.    Had PPD placed thru [facility] clinic on Wednesday and came in for check and was positive.  Sent to  for CXR.    No cough or recent illness.    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    Past Medical History:   Diagnosis Date    Anemia     Constipation     COVID     symptoms 10/26/21, pos test 10/27/21    Eczema, dyshidrotic     Migraine     Migraines     Varicella      Current Outpatient Medications   Medication Sig Dispense Refill    metoprolol succinate ER (TOPROL XL) 50 MG 24 hr tablet Take 1 tablet (50 mg) by mouth daily 90 tablet 3    omeprazole (PRILOSEC) 40 MG DR capsule Take 1 capsule (40 mg) by mouth daily 90 capsule 3    topiramate (TOPAMAX) 25 MG tablet Take 1 tablet (25 mg) by mouth daily For 2 weeks then increase to 50 mg daily 60 tablet 1    ibuprofen (ADVIL/MOTRIN) 600 MG tablet Take 1 tablet (600 mg) by mouth every 6 hours as needed for mild pain or moderate pain Take w/ food (Patient not taking: Reported on 12/1/2023) 30 tablet 0     Social History     Tobacco Use    Smoking status: Never    Smokeless tobacco: Never   Substance Use Topics    Alcohol use: No     Alcohol/week: 0.0 standard drinks of alcohol       ROS:  Review of systems negative except as stated above.    EXAM:   BP (!) 142/88   Pulse 58   Temp 98.6  F (37  C) (Oral)   Ht 1.575 m (5' 2\")   Wt 64 kg (141 lb)   SpO2 100%   BMI 25.79 kg/m    GENERAL APPEARANCE: healthy, alert and no distress  CHEST: clear to auscultation  CV: regular rate and rhythm  PSYCH:alert, affect bright    CXR - no acute infiltrate, " ?perihilar granuloma, no pleural effusion, no pneumothorax      ASSESSMENT/PLAN:  (R76.11) Positive PPD  (primary encounter diagnosis)  Plan: XR Chest 2 Views            No symptoms concerning for active TB, discussed latent TB and follow up with primary provider to review treatment options.  Will follow up on formal xray report and notify if any abnormalities.    Patient would like printed reported of Xray    Follow up with primary provider in 2 weeks    Edgardo Bradford MD  December 1, 2023 5:27 PM

## 2024-01-24 ENCOUNTER — OFFICE VISIT (OUTPATIENT)
Dept: FAMILY MEDICINE | Facility: CLINIC | Age: 34
End: 2024-01-24
Payer: COMMERCIAL

## 2024-01-24 VITALS
HEART RATE: 55 BPM | TEMPERATURE: 98.4 F | DIASTOLIC BLOOD PRESSURE: 75 MMHG | HEIGHT: 62 IN | SYSTOLIC BLOOD PRESSURE: 127 MMHG | BODY MASS INDEX: 28.34 KG/M2 | WEIGHT: 154 LBS | RESPIRATION RATE: 18 BRPM | OXYGEN SATURATION: 100 %

## 2024-01-24 DIAGNOSIS — D64.9 ANEMIA, UNSPECIFIED TYPE: ICD-10-CM

## 2024-01-24 DIAGNOSIS — R76.11 POSITIVE PPD: ICD-10-CM

## 2024-01-24 DIAGNOSIS — M94.0 COSTOCHONDRITIS: ICD-10-CM

## 2024-01-24 DIAGNOSIS — R00.2 PALPITATIONS: Primary | ICD-10-CM

## 2024-01-24 DIAGNOSIS — Z22.7 LATENT TUBERCULOSIS BY BLOOD TEST: ICD-10-CM

## 2024-01-24 LAB
ERYTHROCYTE [DISTWIDTH] IN BLOOD BY AUTOMATED COUNT: 13 % (ref 10–15)
HCT VFR BLD AUTO: 34.3 % (ref 35–47)
HGB BLD-MCNC: 11.3 G/DL (ref 11.7–15.7)
MCH RBC QN AUTO: 30.4 PG (ref 26.5–33)
MCHC RBC AUTO-ENTMCNC: 32.9 G/DL (ref 31.5–36.5)
MCV RBC AUTO: 92 FL (ref 78–100)
PLATELET # BLD AUTO: 229 10E3/UL (ref 150–450)
RBC # BLD AUTO: 3.72 10E6/UL (ref 3.8–5.2)
TSH SERPL DL<=0.005 MIU/L-ACNC: 1.03 UIU/ML (ref 0.3–4.2)
WBC # BLD AUTO: 3.1 10E3/UL (ref 4–11)

## 2024-01-24 PROCEDURE — 36415 COLL VENOUS BLD VENIPUNCTURE: CPT | Performed by: STUDENT IN AN ORGANIZED HEALTH CARE EDUCATION/TRAINING PROGRAM

## 2024-01-24 PROCEDURE — 93000 ELECTROCARDIOGRAM COMPLETE: CPT | Performed by: STUDENT IN AN ORGANIZED HEALTH CARE EDUCATION/TRAINING PROGRAM

## 2024-01-24 PROCEDURE — 85027 COMPLETE CBC AUTOMATED: CPT | Performed by: STUDENT IN AN ORGANIZED HEALTH CARE EDUCATION/TRAINING PROGRAM

## 2024-01-24 PROCEDURE — 90686 IIV4 VACC NO PRSV 0.5 ML IM: CPT | Performed by: STUDENT IN AN ORGANIZED HEALTH CARE EDUCATION/TRAINING PROGRAM

## 2024-01-24 PROCEDURE — 84443 ASSAY THYROID STIM HORMONE: CPT | Performed by: STUDENT IN AN ORGANIZED HEALTH CARE EDUCATION/TRAINING PROGRAM

## 2024-01-24 PROCEDURE — 99214 OFFICE O/P EST MOD 30 MIN: CPT | Mod: 25 | Performed by: STUDENT IN AN ORGANIZED HEALTH CARE EDUCATION/TRAINING PROGRAM

## 2024-01-24 PROCEDURE — 86481 TB AG RESPONSE T-CELL SUSP: CPT | Performed by: STUDENT IN AN ORGANIZED HEALTH CARE EDUCATION/TRAINING PROGRAM

## 2024-01-24 PROCEDURE — 90471 IMMUNIZATION ADMIN: CPT | Performed by: STUDENT IN AN ORGANIZED HEALTH CARE EDUCATION/TRAINING PROGRAM

## 2024-01-24 ASSESSMENT — PAIN SCALES - GENERAL: PAINLEVEL: SEVERE PAIN (7)

## 2024-01-24 NOTE — PROGRESS NOTES
"  Assessment & Plan     Palpitations  Intermittent, described as a single missed beat, occurring about three times daily for the past three days. This description assisted using hand tapping. No racing/fast heart rate nor red flag symptoms/signs (chest pain very clearly costochondritis). Last episode before this was about 6 months ago. Taking metoprolol (for HTN) consistently. Chart review reveals anemia in the past, will obtain labs today. EKG unremarkable. Plan to await labs, otherwise very likely PAC/PVC.   - CBC with platelets  - TSH with free T4 reflex  - EKG 12-lead complete w/read - Clinics    Costochondritis  Discussed conservative management including ice, NSAID/acetaminophen, activity modification.     Positive PPD  In setting of prior BCG vaccination which affects PPD result. Obtaining Quantiferon gold. (Already has had neg CXR x2)  - Quantiferon TB Gold Plus    BMI  Estimated body mass index is 28.17 kg/m  as calculated from the following:    Height as of this encounter: 1.575 m (5' 2\").    Weight as of this encounter: 69.9 kg (154 lb).     Follow up in 5 months for annual physical    Estevan Shay MD  [facility]  1/24/2024    Viji Doan is a 33 year old, presenting for the following health issues:  Heart Problem (High pulse)        1/24/2024     7:21 AM   Additional Questions   Roomed by Dinah ALVAREZ     History of Present Illness       Hypertension: She presents for follow up of hypertension.  She does check blood pressure  regularly outside of the clinic. Outside blood pressures have been over 140/90. She follows a low salt diet.     Headaches:   Since the patient's last clinic visit, headaches are: no change  The patient is getting headaches:  Every [id number]  She is not able to do normal daily activities when she has a migraine.  The patient is taking the following rescue/relief medications:  Ibuprofen (Advil, Motrin) and Tylenol   Patient states \"I get some relief\" from " the rescue/relief medications.   The patient is taking the following medications to prevent migraines:  Other  In the past 4 weeks, the patient has gone to an Urgent Care or Emergency Room 1 time times due to headaches.    Reason for visit:  Chest pain, headach,dizzness and bpp    She eats 2-3 servings of fruits and vegetables daily.She consumes 1 sweetened beverage(s) daily.She exercises with enough effort to increase her heart rate 10 to 19 minutes per day.  She exercises with enough effort to increase her heart rate 3 or less days per week.   She is taking medications regularly.     Chest pain  When sleeping, feels pain in the bottom/sides below/near sternum.  For the past 3 days now. Comes and goes. Not constant.   Drinking milk or tylenol will help the pain.   Pain gets worse after singing for 2 hrs, the pain will return.   Worse when sleeping on her side in bed too.   Was told she had heartburn, 7 years ago with first son.   Takes omeprazole once a day. Has been taking this for 7 years.   Sometimes feel nausea.   No dysphagia. No diarrhea/constipation. No changes in stool color, no blood, no dark tarry stools. No vomiting.   Dancing for exercise and no chest pain with this.   Also runs around park with kids and no chest pain during any exertion.     HR  Feels missed beat occasionally.   No symptoms with this missed beat. Notes it is one single missed beat at a time.  Denies any racing or quick heart rate.   Started last year. Is episodic, has noticed this for the past 3 days. Occurring three times per day.   Last time this occurred was about 6 months ago.   No fam or personal hx of heart disease, MI, arrhythmia.  No SOB but does feel ?dizzy when the palpitation.   Takes metoprolol at night. Missing a dose of metoprolol doesn't affect the heart rate.     Still taking the topiramate for the migraine.     Had positive PPD, CXR was negative twice.   Had BCG vaccination in past.         Objective    /75 (BP  "Location: Right arm, Patient Position: Sitting, Cuff Size: Adult Large)   Pulse 55   Temp 98.4  F (36.9  C) (Oral)   Resp 18   Ht 1.575 m (5' 2\")   Wt 69.9 kg (154 lb)   LMP 01/15/2024 (Approximate)   SpO2 100%   BMI 28.17 kg/m    Body mass index is 28.17 kg/m .    Physical Exam   GENERAL: healthy, alert and no distress  HEAD: Normocephalic, atraumatic.   EYES: Normal conjunctivae, sclera.   NECK:  Thyroid not enlarged, not TTP.  RESP: lungs clear to auscultation - no rales, rhonchi or wheezes  CV: Chest pain reproduced w/ palpation along mid-anterior lower ribs bilaterally. Regular rate and rhythm, normal S1 S2, no murmur, click, rub or gallop.  No peripheral swelling noted.   ABDOMEN: soft, no TTP x4 quadrants. No hepatomegaly or masses appreciated. BS normactive.  MSK: no gross musculoskeletal defects noted.  SKIN: no suspicious lesions or rashes.  EXT: Warm and well perfused. DP pulses 2+ bilaterally.  NEURO: CNII-XII grossly intact. No focal deficits.  PSYCH: Groomed, dressed appropriately for weather. Normal mood with consistent affect.     EKG:   Indication: Palpitations  Impression: Normal axis. Sinus bradycardia with borderline 1st degree AV block. Compared to EKG from 2021, T wave abnormality resolved. Sinus bradycardia new.     Signed Electronically by: Estevan Shay MD    "

## 2024-01-25 PROBLEM — D64.9 ANEMIA: Status: ACTIVE | Noted: 2024-01-25

## 2024-01-25 LAB
QUANTIFERON MITOGEN: 10 IU/ML
QUANTIFERON NIL TUBE: 0.85 IU/ML

## 2024-01-26 LAB
GAMMA INTERFERON BACKGROUND BLD IA-ACNC: 0.85 IU/ML
M TB IFN-G BLD-IMP: POSITIVE
M TB IFN-G CD4+ BCKGRND COR BLD-ACNC: 9.15 IU/ML
MITOGEN IGNF BCKGRD COR BLD-ACNC: 9.15 IU/ML
MITOGEN IGNF BCKGRD COR BLD-ACNC: 9.15 IU/ML
QUANTIFERON TB1 TUBE: 10 IU/ML
QUANTIFERON TB2 TUBE: 10

## 2024-01-29 NOTE — PROGRESS NOTES
New Referral Triage: LTBI    Is the patient a pre-transplant Eval: no     Is the patient Symptomatic (Lungs, GI, Lymph nodes, eyes): none     If yes to lungs:   - Do they currently have a cough:   - Phlegm/ sputum production:   - Fever/ Chills/ Night sweats:   -Difficulty in breathing:   -Unintentional weight loss in last 3 months:   -Duration of symptoms:    Chest imaging available within last month:CXR was negative twice. Last CXR 12/1/23.    Had BCG vaccination in past.       If yes- Normal or abnormal:    If patient is experiencing symptoms AND Abnormal imaging: Order sputum AFB x3 (8 hours apart).    Message sent to Ambulatory Infection Prevention Pool for TB flag:     Scheduling protocol:  If active TB is ruled out- schedule for routine LTBI visit.    If pre-transplant workup- schedule as urgent with TID provider.    If suspected active TB- Schedule soonest available appointment.

## 2024-02-16 ENCOUNTER — LAB (OUTPATIENT)
Dept: LAB | Facility: CLINIC | Age: 34
End: 2024-02-16
Payer: COMMERCIAL

## 2024-02-16 DIAGNOSIS — D64.9 ANEMIA, UNSPECIFIED TYPE: ICD-10-CM

## 2024-02-16 LAB
FERRITIN SERPL-MCNC: 49 NG/ML (ref 6–175)
FOLATE SERPL-MCNC: 35.4 NG/ML (ref 4.6–34.8)
IRON BINDING CAPACITY (ROCHE): 284 UG/DL (ref 240–430)
IRON SATN MFR SERPL: 42 % (ref 15–46)
IRON SERPL-MCNC: 120 UG/DL (ref 37–145)
RETICS # AUTO: 0.07 10E6/UL (ref 0.03–0.1)
RETICS/RBC NFR AUTO: 1.9 % (ref 0.5–2)
VIT B12 SERPL-MCNC: 1082 PG/ML (ref 232–1245)

## 2024-02-16 PROCEDURE — 36415 COLL VENOUS BLD VENIPUNCTURE: CPT

## 2024-02-16 PROCEDURE — 82728 ASSAY OF FERRITIN: CPT

## 2024-02-16 PROCEDURE — 83540 ASSAY OF IRON: CPT

## 2024-02-16 PROCEDURE — 82607 VITAMIN B-12: CPT

## 2024-02-16 PROCEDURE — 83550 IRON BINDING TEST: CPT

## 2024-02-16 PROCEDURE — 82746 ASSAY OF FOLIC ACID SERUM: CPT

## 2024-02-16 PROCEDURE — 85045 AUTOMATED RETICULOCYTE COUNT: CPT

## 2024-03-05 DIAGNOSIS — K21.00 GASTROESOPHAGEAL REFLUX DISEASE WITH ESOPHAGITIS WITHOUT HEMORRHAGE: ICD-10-CM

## 2024-03-05 RX ORDER — OMEPRAZOLE 40 MG/1
40 CAPSULE, DELAYED RELEASE ORAL DAILY
Qty: 90 CAPSULE | Refills: 0 | Status: SHIPPED | OUTPATIENT
Start: 2024-03-05 | End: 2024-08-26

## 2024-04-26 ENCOUNTER — OFFICE VISIT (OUTPATIENT)
Dept: INFECTIOUS DISEASES | Facility: CLINIC | Age: 34
End: 2024-04-26
Attending: STUDENT IN AN ORGANIZED HEALTH CARE EDUCATION/TRAINING PROGRAM
Payer: COMMERCIAL

## 2024-04-26 VITALS
DIASTOLIC BLOOD PRESSURE: 76 MMHG | OXYGEN SATURATION: 99 % | BODY MASS INDEX: 27.84 KG/M2 | HEART RATE: 52 BPM | WEIGHT: 152.2 LBS | SYSTOLIC BLOOD PRESSURE: 115 MMHG | TEMPERATURE: 97.9 F

## 2024-04-26 DIAGNOSIS — K21.9 GASTROESOPHAGEAL REFLUX DISEASE, UNSPECIFIED WHETHER ESOPHAGITIS PRESENT: ICD-10-CM

## 2024-04-26 DIAGNOSIS — R10.13 EPIGASTRIC PAIN: ICD-10-CM

## 2024-04-26 DIAGNOSIS — Z22.7 LATENT TUBERCULOSIS BY BLOOD TEST: Primary | ICD-10-CM

## 2024-04-26 LAB
ALBUMIN SERPL BCG-MCNC: 4.8 G/DL (ref 3.5–5.2)
ALP SERPL-CCNC: 40 U/L (ref 40–150)
ALT SERPL W P-5'-P-CCNC: 10 U/L (ref 0–50)
ANION GAP SERPL CALCULATED.3IONS-SCNC: 7 MMOL/L (ref 7–15)
AST SERPL W P-5'-P-CCNC: 20 U/L (ref 0–45)
BASOPHILS # BLD AUTO: 0 10E3/UL (ref 0–0.2)
BASOPHILS NFR BLD AUTO: 1 %
BILIRUB SERPL-MCNC: 0.4 MG/DL
BUN SERPL-MCNC: 14.3 MG/DL (ref 6–20)
CALCIUM SERPL-MCNC: 9.2 MG/DL (ref 8.6–10)
CHLORIDE SERPL-SCNC: 106 MMOL/L (ref 98–107)
CREAT SERPL-MCNC: 0.97 MG/DL (ref 0.51–0.95)
DEPRECATED HCO3 PLAS-SCNC: 28 MMOL/L (ref 22–29)
EGFRCR SERPLBLD CKD-EPI 2021: 78 ML/MIN/1.73M2
EOSINOPHIL # BLD AUTO: 0 10E3/UL (ref 0–0.7)
EOSINOPHIL NFR BLD AUTO: 1 %
ERYTHROCYTE [DISTWIDTH] IN BLOOD BY AUTOMATED COUNT: 13.2 % (ref 10–15)
GLUCOSE SERPL-MCNC: 83 MG/DL (ref 70–99)
HCT VFR BLD AUTO: 36.1 % (ref 35–47)
HGB BLD-MCNC: 11.8 G/DL (ref 11.7–15.7)
IMM GRANULOCYTES # BLD: 0 10E3/UL
IMM GRANULOCYTES NFR BLD: 0 %
LYMPHOCYTES # BLD AUTO: 1.7 10E3/UL (ref 0.8–5.3)
LYMPHOCYTES NFR BLD AUTO: 47 %
MCH RBC QN AUTO: 30.2 PG (ref 26.5–33)
MCHC RBC AUTO-ENTMCNC: 32.7 G/DL (ref 31.5–36.5)
MCV RBC AUTO: 92 FL (ref 78–100)
MONOCYTES # BLD AUTO: 0.3 10E3/UL (ref 0–1.3)
MONOCYTES NFR BLD AUTO: 8 %
NEUTROPHILS # BLD AUTO: 1.6 10E3/UL (ref 1.6–8.3)
NEUTROPHILS NFR BLD AUTO: 44 %
NRBC # BLD AUTO: 0 10E3/UL
NRBC BLD AUTO-RTO: 0 /100
PLATELET # BLD AUTO: 224 10E3/UL (ref 150–450)
POTASSIUM SERPL-SCNC: 4.1 MMOL/L (ref 3.4–5.3)
PROT SERPL-MCNC: 8 G/DL (ref 6.4–8.3)
RBC # BLD AUTO: 3.91 10E6/UL (ref 3.8–5.2)
SODIUM SERPL-SCNC: 141 MMOL/L (ref 135–145)
VIT D+METAB SERPL-MCNC: 23 NG/ML (ref 20–50)
WBC # BLD AUTO: 3.7 10E3/UL (ref 4–11)

## 2024-04-26 PROCEDURE — 82306 VITAMIN D 25 HYDROXY: CPT | Performed by: INTERNAL MEDICINE

## 2024-04-26 PROCEDURE — 99204 OFFICE O/P NEW MOD 45 MIN: CPT | Performed by: INTERNAL MEDICINE

## 2024-04-26 PROCEDURE — 85025 COMPLETE CBC W/AUTO DIFF WBC: CPT | Performed by: INTERNAL MEDICINE

## 2024-04-26 PROCEDURE — 36415 COLL VENOUS BLD VENIPUNCTURE: CPT | Performed by: INTERNAL MEDICINE

## 2024-04-26 PROCEDURE — 80053 COMPREHEN METABOLIC PANEL: CPT | Performed by: INTERNAL MEDICINE

## 2024-04-26 NOTE — NURSING NOTE
"Olimpia Herrera's goals for this visit include:   Chief Complaint   Patient presents with    Consult     Consultation for Latent tuberculosis by blood test, referred by Estevan Shay MD       PCP: Tanisha Oviedo    Referring Provider:  Estevan Shay MD  [id number] [place] [address]      Initial /76 (BP Location: Left arm, Patient Position: Sitting, Cuff Size: Adult Regular)   Pulse 52   Temp 97.9  F (36.6  C) (Oral)   Wt 69 kg (152 lb 3.2 oz)   LMP 04/18/2024 (Approximate)   SpO2 99%   Breastfeeding No   BMI 27.84 kg/m   Estimated body mass index is 27.84 kg/m  as calculated from the following:    Height as of 1/24/24: 1.575 m (5' 2\").    Weight as of this encounter: 69 kg (152 lb 3.2 oz).    Medication Reconciliation: complete    Do you need any medication refills at today's visit? none    GASTON Alejandra  Rheumatology/Infectious disease  [facility]   [phone]      "

## 2024-04-27 ENCOUNTER — DOCUMENTATION ONLY (OUTPATIENT)
Dept: INFECTIOUS DISEASES | Facility: CLINIC | Age: 34
End: 2024-04-27
Payer: COMMERCIAL

## 2024-04-27 NOTE — PROGRESS NOTES
ID brief note    called and discussed labs   Vitamin D is lower limit of normal. take Vitamin D3 1000 IU daily ( can buy this OTC)   patient's GERD and dyspepsia symptoms re slightly better. recommend stopping Ibuprofen / NSAIDs and continue Omeprazole daily or BID   Patient will call and update symptoms. If symptoms improve, will start Rifampin. We can wait to start Rifampin    GLADIS Callejas MD

## 2024-04-27 NOTE — PROGRESS NOTES
INFECTIOUS DISEASES CONSULTATION  NOTE    Consult requested by: Estevan Shay MD   Reason for Consult : Latent TB   Date of Consult: 4/26/24     Infectious Diseases Clinic     HISTORY OF PRESENT ILLNESS:                                                    Olimpia Herrera is a 33 year old female with PMHx hypertension, migraines, GERD, presents with positive quantiferon test.     she was born in [place] and worked as a  in [place], before [place] in [place]. She had BCG as a child and denies any known TB exposure.   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   As part of TB screening test, her quantiferon test on 1/24/24 was positive     She denies chronic fevers, chills, sweats, weight loss, chronic pain, cough, shortness of breath. she has a history of migraines and takes Ibuprofen 400 mg po daily as needed to control pain. she usually takes Ibuprofen and all her medications in the morning on empty stomach. she does not eat until after 12 noon each day. she prays every moning until 12 noon each day.  she takes all her medications including Ibuprofen n the morning, she now complains of GERD with sometimes chest pain and stomach pain. denies diarrhea/ melena / bloody stools.  She is taking Omeprazole 40 mg po daily but still has GERD symptoms     Discussed labs  Her CXR on 12/1/23 was negative    Imagings:  esophagram 8/25/2020  IMPRESSION:   1. Mild gastroesophageal reflux was observed during the exam.  2. Otherwise unremarkable double contrast esophagram.      ROS:  CONSTITUTIONAL:NEGATIVE for fever, chills, change in weight  INTEGUMENTARY/SKIN: NEGATIVE for worrisome rashes, moles or lesions  EYES: NEGATIVE for vision changes or irritation  ENT/MOUTH: NEGATIVE for ear, mouth and throat problems  RESP:NEGATIVE for significant cough or SOB  CV: NEGATIVE for chest pain, palpitations or peripheral edema  GI: see HPI  : NEGATIVE for urinary  frequency, hematuria or dysuria  MUSCULOSKELETAL: NEGATIVE for significant arthralgias or myalgia  NEURO: migraines  ENDOCRINE: NEGATIVE for temperature intolerance, skin/hair changes  HEME/ALLERGY/IMMUNE: NEGATIVE for bleeding problems  PSYCHIATRIC: NEGATIVE for changes in mood or affect    Problem list and histories reviewed & adjusted, as indicated.  Additional history: as documented    PAST MEDICAL HISTORY:  Patient  has a past medical history of Anemia, Constipation, COVID, Eczema, dyshidrotic, Migraine, Migraines, and Varicella.    PAST SURGICAL HISTORY:  Patient  has a past surgical history that includes appendectomy.    CURRENT MEDICATIONS:  Current Outpatient Medications   Medication Sig Dispense Refill    ibuprofen (ADVIL/MOTRIN) 600 MG tablet Take 1 tablet (600 mg) by mouth every 6 hours as needed for mild pain or moderate pain Take w/ food 30 tablet 0    metoprolol succinate ER (TOPROL XL) 50 MG 24 hr tablet Take 1 tablet (50 mg) by mouth daily 90 tablet 3    omeprazole (PRILOSEC) 40 MG DR capsule TAKE ONE CAPSULE BY MOUTH ONE TIME DAILY 90 capsule 0    topiramate (TOPAMAX) 25 MG tablet Take 1 tablet (25 mg) by mouth daily For 2 weeks then increase to 50 mg daily 60 tablet 1     ALLERGY:  Allergies   Allergen Reactions    Compazine [Prochlorperazine] Swelling     Swelling of tongue    Zantac [Ranitidine] Other (See Comments)     IV Zantac caused : Anxiety     IMMUNIZATION HISTORY:  Immunization History   Administered Date(s) Administered    COVID-19 Monovalent 12+ (Pfizer 2022) 07/05/2022, 08/01/2022    Influenza Vaccine >6 months,quad, PF 09/15/2015, 10/12/2018, 10/23/2019, 10/01/2021, 12/07/2022, 01/24/2024    TDAP (Adacel,Boostrix) 12/01/2013, 04/29/2016, 10/12/2018, 10/25/2021    TDAP Vaccine (Adacel) 10/12/2018    TDAP Vaccine (Boostrix) 04/29/2016, 10/12/2018, 10/25/2021     SOCIAL HISTORY:  Patient  reports that she has never smoked. She has never used smokeless tobacco. She reports that she does  not drink alcohol and does not use drugs.   with 3 childern, 6 yo son, 4 yo son, 3 yo daughter  no alcohol, no tobacco, no illicit drugs     FAMILY HISTORY:  Patient's family history includes Hypertension in her maternal grandmother and mother; No Known Problems in her sister and sister.  4 siblings.     Labs reviewed in EPIC    OBJECTIVE:                                                    /76 (BP Location: Left arm, Patient Position: Sitting, Cuff Size: Adult Regular)   Pulse 52   Temp 97.9  F (36.6  C) (Oral)   Wt 69 kg (152 lb 3.2 oz)   LMP 04/18/2024 (Approximate)   SpO2 99%   Breastfeeding No   BMI 27.84 kg/m   Body mass index is 27.84 kg/m .   GENERAL: healthy, alert and no distress  EYES: Eyes grossly normal to inspection, and conjunctivae and sclerae normal  HENT:  mouth without ulcers or lesions  NECK: no adenopathy, no asymmetry, masses, or scars and thyroid normal to palpation  RESP: lungs clear to auscultation - no rales, rhonchi or wheezes  CV: regular rate and rhythm, normal S1 S2, no S3 or S4, no murmur, click or rub, no peripheral edema and peripheral pulses strong  ABDOMEN: soft, nontender, no hepatosplenomegaly, no masses and bowel sounds normal  MS: no gross musculoskeletal defects noted, no edema  SKIN: no suspicious lesions or rashes  NEURO: Normal strength and tone, mentation intact and speech normal  BACK: no CVA tenderness, no paralumbar tenderness  PSYCH: mentation appears normal, affect normal/bright  LYMPH: no cervical, supraclavicular, adenopathy       ASSESSMENT AND PLAN:                                                      Latent TB   GERD and Epigastric pain, possibly gastritis   Migraines   Hypertension - under control     Recommendations  - Labs: CMP, CBC diff,  - if labs are normal, will start her on Rifampin 600 mg po daily x 4 months   - will check CMP, CBC in 2-4 weeks after starting Rifampin    - discussed potential side effects of Rifampin   - concerns are -  GERD and possibly gastritis.. advised to continue Omeprazole and if symptoms persist, increased to BID , avoid NSAID if possible, take Tylenol PRN and topamax  - avodi foods that can worsen GERD/ gastritis. if not better, will check for H.pylori.   - discussued aspiration precautions   - if migraines are not better, advised her to follow-up with PCP     Video follow-up with me on 5/21/24 at 3.30 PM     Thank You for allowing me to participate in the care of this patient    Joel Callejas MD, M.Med.Sc  Division of Infectious Diseases and International Medicine  [facility]      50 Minutes spent by me on the date of service doing chart review, history, exam, documentation, coordination of care and further activities per the note

## 2024-05-06 ENCOUNTER — MYC MEDICAL ADVICE (OUTPATIENT)
Dept: INFECTIOUS DISEASES | Facility: CLINIC | Age: 34
End: 2024-05-06
Payer: COMMERCIAL

## 2024-05-21 ENCOUNTER — VIRTUAL VISIT (OUTPATIENT)
Dept: INFECTIOUS DISEASES | Facility: CLINIC | Age: 34
End: 2024-05-21
Payer: COMMERCIAL

## 2024-05-21 DIAGNOSIS — Z22.7 LATENT TUBERCULOSIS BY BLOOD TEST: ICD-10-CM

## 2024-05-21 DIAGNOSIS — Z51.81 ENCOUNTER FOR MEDICATION MONITORING: ICD-10-CM

## 2024-05-21 DIAGNOSIS — Z22.7 LATENT TUBERCULOSIS INFECTION: Primary | ICD-10-CM

## 2024-05-21 PROCEDURE — 99214 OFFICE O/P EST MOD 30 MIN: CPT | Mod: 95 | Performed by: INTERNAL MEDICINE

## 2024-05-21 RX ORDER — RIFAMPIN 300 MG/1
600 CAPSULE ORAL DAILY
Qty: 60 CAPSULE | Refills: 0 | Status: SHIPPED | OUTPATIENT
Start: 2024-05-21 | End: 2024-06-18

## 2024-05-21 NOTE — PROGRESS NOTES
Olimpia Herrera is a 33 year old who is being evaluated via a billable video visit.      How would you like to obtain your AVS? MyChart  If the video visit is dropped, the invitation should be resent by: Text to cell phone: [phone]  Will anyone else be joining your video visit? No  Are you still currently in the state of [place]? Yes  If patient encounters technical issues they should call [phone]  During this virtual visit the patient is located in [place], patient verifies this as the location during the entirety of this visit.   Video-Visit Details  Video Start Time: 3.30 PM   Video End Time: 3.41 PM   Originating Location (pt. Location): Home  Distant Location (provider location):  OFFSITE  Platform used for Video Visit: [employer]    INFECTIOUS DISEASES PROGRESS NOTE    Consult requested by: Estevna Shay MD   Reason for Consult : Latent TB   Date of Consult: 4/26/24     Infectious Diseases Clinic     HISTORY OF PRESENT ILLNESS:                                                    Olimpia Herrera is a 33 year old female with PMHx hypertension, migraines, GERD, presents with positive quantiferon test.     she was born in [place] and worked as a  in [employer], before [place] in [place]. She had BCG as a child and denies any known TB exposure.   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   As part of TB screening test, her quantiferon test on 1/24/24 was positive     She denies chronic fevers, chills, sweats, weight loss, chronic pain, cough, shortness of breath. she has a history of migraines and takes Ibuprofen 400 mg po daily as needed to control pain. she usually takes Ibuprofen and all her medications in the morning on empty stomach. she does not eat until after 12 noon each day. she prays every moning until 12 noon each day.  she takes all her medications including Ibuprofen n the morning, she now complains of GERD with sometimes chest pain and  stomach pain. denies diarrhea/ melena / bloody stools.  She is taking Omeprazole 40 mg po daily but still has GERD symptoms     Discussed labs  Her CXR on 12/1/23 was negative    Imagings:  esophagram 8/25/2020  IMPRESSION:   1. Mild gastroesophageal reflux was observed during the exam.  2. Otherwise unremarkable double contrast esophagram.    Video follow-up on 5/21/24  Feeling better since she stopped inuprofen and started Omeprazole. denies nausea, vomiting, abdominal pain, diarrhea. migraines are getting better  wants to start Rifampin. discussed indicatuon and potential side effects of rifampin.      Problem list and histories reviewed & adjusted, as indicated.  Additional history: as documented    PAST MEDICAL HISTORY:  Patient  has a past medical history of Anemia, Constipation, COVID, Eczema, dyshidrotic, Migraine, Migraines, and Varicella.    PAST SURGICAL HISTORY:  Patient  has a past surgical history that includes appendectomy.    CURRENT MEDICATIONS:  Current Outpatient Medications   Medication Sig Dispense Refill    ibuprofen (ADVIL/MOTRIN) 600 MG tablet Take 1 tablet (600 mg) by mouth every 6 hours as needed for mild pain or moderate pain Take w/ food 30 tablet 0    metoprolol succinate ER (TOPROL XL) 50 MG 24 hr tablet Take 1 tablet (50 mg) by mouth daily 90 tablet 3    omeprazole (PRILOSEC) 40 MG DR capsule TAKE ONE CAPSULE BY MOUTH ONE TIME DAILY 90 capsule 0    topiramate (TOPAMAX) 25 MG tablet Take 1 tablet (25 mg) by mouth daily For 2 weeks then increase to 50 mg daily 60 tablet 1     ALLERGY:  Allergies   Allergen Reactions    Compazine [Prochlorperazine] Swelling     Swelling of tongue    Zantac [Ranitidine] Other (See Comments)     IV Zantac caused : Anxiety     IMMUNIZATION HISTORY:  Immunization History   Administered Date(s) Administered    COVID-19 Monovalent 12+ (Pfizer 2022) 07/05/2022, 08/01/2022    Influenza Vaccine >6 months,quad, PF 09/15/2015, 10/12/2018, 10/23/2019, 10/01/2021,  12/07/2022, 01/24/2024    TDAP (Adacel,Boostrix) 12/01/2013, 04/29/2016, 10/12/2018, 10/25/2021    TDAP Vaccine (Adacel) 10/12/2018    TDAP Vaccine (Boostrix) 04/29/2016, 10/12/2018, 10/25/2021     SOCIAL HISTORY:  Patient  reports that she has never smoked. She has never used smokeless tobacco. She reports that she does not drink alcohol and does not use drugs.   with 3 childern, 6 yo son, 4 yo son, 3 yo daughter  no alcohol, no tobacco, no illicit drugs     FAMILY HISTORY:  Patient's family history includes Hypertension in her maternal grandmother and mother; No Known Problems in her sister and sister.  4 siblings.     Labs reviewed in EPIC    OBJECTIVE:                                                    GENERAL: healthy, alert and no distress  EYES: Eyes grossly normal to inspection, and conjunctivae and sclerae normal  RESP: breathing comfortably on room air  SKIN: no suspicious lesions or rashes  NEURO: mentation intact and speech normal  PSYCH: mentation appears normal, affect normal       ASSESSMENT AND PLAN:                                                      Latent TB   GERD and Epigastric pain, possibly gastritis   Migraines   Hypertension - under control     Recommendations  - start her on Rifampin 600 mg po daily x 4 months   - will check CMP, CBC in 2-4 weeks after starting Rifampin    - discussed potential side effects of Rifampin       Video follow-up with me 6/18 at 2.30 PM       Joel Callejas MD, M.Med.Sc  Division of Infectious Diseases and International Medicine  [facility]      35 Minutes spent by me on the date of service doing chart review, history, exam, documentation, coordination of care and further activities per the note

## 2024-05-23 ENCOUNTER — PATIENT OUTREACH (OUTPATIENT)
Dept: CARE COORDINATION | Facility: CLINIC | Age: 34
End: 2024-05-23
Payer: COMMERCIAL

## 2024-05-30 ENCOUNTER — MYC MEDICAL ADVICE (OUTPATIENT)
Dept: INFECTIOUS DISEASES | Facility: CLINIC | Age: 34
End: 2024-05-30
Payer: COMMERCIAL

## 2024-05-30 NOTE — TELEPHONE ENCOUNTER
"Replied to patient's [employer] message requesting more information about her diarrhea. Provided side effect information for Rifampin from references tab \"Copyright(c) 2023 [employer].\"  "

## 2024-06-06 ENCOUNTER — PATIENT OUTREACH (OUTPATIENT)
Dept: CARE COORDINATION | Facility: CLINIC | Age: 34
End: 2024-06-06
Payer: COMMERCIAL

## 2024-06-06 NOTE — PROGRESS NOTES
Clinic Care Coordination Contact  Program:   [facility]: [place]     Renewal: [facility]   Date Applied:      MICHAEL Outreach:   6/6/24:  CTA called to see if patient needed assistance with their [employer] Renewal. Patient declined needing assistance and no follow up needed   CTA WILL MAIL APPLICATION TO MISHA [facility]  Care   [facility]  Clinic Care Coordination  [phone]      Health Insurance:        Referral/Screening:

## 2024-06-18 ENCOUNTER — VIRTUAL VISIT (OUTPATIENT)
Dept: INFECTIOUS DISEASES | Facility: CLINIC | Age: 34
End: 2024-06-18
Payer: COMMERCIAL

## 2024-06-18 DIAGNOSIS — R10.13 EPIGASTRIC ABDOMINAL PAIN: ICD-10-CM

## 2024-06-18 DIAGNOSIS — R11.2 NAUSEA AND VOMITING, UNSPECIFIED VOMITING TYPE: ICD-10-CM

## 2024-06-18 DIAGNOSIS — Z22.7 LATENT TUBERCULOSIS INFECTION: Primary | ICD-10-CM

## 2024-06-18 PROCEDURE — 99214 OFFICE O/P EST MOD 30 MIN: CPT | Mod: 95 | Performed by: INTERNAL MEDICINE

## 2024-06-18 RX ORDER — RIFAMPIN 300 MG/1
600 CAPSULE ORAL DAILY
Qty: 60 CAPSULE | Refills: 0 | Status: CANCELLED | OUTPATIENT
Start: 2024-06-18

## 2024-06-18 NOTE — PROGRESS NOTES
Olimpia Herrera is a 33 year old who is being evaluated via a billable video visit.    How would you like to obtain your AVS? MyChart  If the video visit is dropped, the invitation should be resent by: Text to cell phone: [phone]  Will anyone else be joining your video visit? No  Are you still currently in the state of [place]? Yes  If patient encounters technical issues they should call [phone]  During this virtual visit the patient is located in [place], patient verifies this as the location during the entirety of this visit.   Video-Visit Details  Video Start Time: 2.30 PM  Video End Time: 2.41 PM   Originating Location (pt. Location): Home  Distant Location (provider location):  Off site  Platform used for Video Visit: Ant Herrera is a 33 year old who is being evaluated via a billable video visit.      INFECTIOUS DISEASES PROGRESS NOTE    Consult requested by: Estevan Shay MD   Reason for Consult : Latent TB   Date of Consult: 4/26/24     Infectious Diseases Clinic     HISTORY OF PRESENT ILLNESS:                                                    Olimpia Herrera is a 33 year old female with PMHx hypertension, migraines, GERD, presents with positive quantiferon test.     she was born in [place] and worked as a  in [employer], before [place]. She had BCG as a child and denies any known TB exposure.   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   As part of TB screening test, her quantiferon test on 1/24/24 was positive     She denies chronic fevers, chills, sweats, weight loss, chronic pain, cough, shortness of breath. she has a history of migraines and takes Ibuprofen 400 mg po daily as needed to control pain. she usually takes Ibuprofen and all her medications in the morning on empty stomach. she does not eat until after 12 noon each day. she prays every moning until 12 noon each day.  she takes all her medications  including Ibuprofen n the morning, she now complains of GERD with sometimes chest pain and stomach pain. denies diarrhea/ melena / bloody stools.  She is taking Omeprazole 40 mg po daily but still has GERD symptoms     Discussed labs  Her CXR on 12/1/23 was negative    Imagings:  esophagram 8/25/2020  IMPRESSION:   1. Mild gastroesophageal reflux was observed during the exam.  2. Otherwise unremarkable double contrast esophagram.    Video follow-up on 5/21/24  Feeling better since she stopped ibuprofen and started Omeprazole. denies nausea, vomiting, abdominal pain, diarrhea. migraines are getting better  wants to start Rifampin. discussed  indicatuon and potential side effects of rifampin.    Video follow-up on 6/18/24   Olimpia did not go to the lab at 2 weeks after starting Rifampin. She has nausea and vomiting with epigastric pain with Rifampin. Her appetite is not good recently. denies diarrhea. no black stools. She is taking Omperazole daily     Problem list and histories reviewed & adjusted, as indicated.  Additional history: as documented    PAST MEDICAL HISTORY:  Patient  has a past medical history of Anemia, Constipation, COVID, Eczema, dyshidrotic, Migraine, Migraines, and Varicella.    PAST SURGICAL HISTORY:  Patient  has a past surgical history that includes appendectomy.    CURRENT MEDICATIONS:  Current Outpatient Medications   Medication Sig Dispense Refill    metoprolol succinate ER (TOPROL XL) 50 MG 24 hr tablet Take 1 tablet (50 mg) by mouth daily 90 tablet 3    omeprazole (PRILOSEC) 40 MG DR capsule TAKE ONE CAPSULE BY MOUTH ONE TIME DAILY 90 capsule 0    rifampin (RIFADIN) 300 MG capsule Take 2 capsules (600 mg) by mouth daily for 30 days (will be prescribed monthly for 4 months, depending on labs) 60 capsule 0    topiramate (TOPAMAX) 25 MG tablet Take 1 tablet (25 mg) by mouth daily For 2 weeks then increase to 50 mg daily 60 tablet 1     ALLERGY:  Allergies   Allergen Reactions    Compazine  [Prochlorperazine] Swelling     Swelling of tongue    Zantac [Ranitidine] Other (See Comments)     IV Zantac caused : Anxiety     IMMUNIZATION HISTORY:  Immunization History   Administered Date(s) Administered    COVID-19 Monovalent 12+ (Pfizer 2022) 07/05/2022, 08/01/2022    Influenza Vaccine >6 months,quad, PF 09/15/2015, 10/12/2018, 10/23/2019, 10/01/2021, 12/07/2022, 01/24/2024    TDAP (Adacel,Boostrix) 12/01/2013, 04/29/2016, 10/12/2018, 10/25/2021    TDAP Vaccine (Adacel) 10/12/2018    TDAP Vaccine (Boostrix) 04/29/2016, 10/12/2018, 10/25/2021     SOCIAL HISTORY:  Patient  reports that she has never smoked. She has never used smokeless tobacco. She reports that she does not drink alcohol and does not use drugs.   with 3 childern, 8 yo son, 4 yo son, 1 yo daughter  no alcohol, no tobacco, no illicit drugs     FAMILY HISTORY:  Patient's family history includes Hypertension in her maternal grandmother and mother; No Known Problems in her sister and sister.  4 siblings.     Labs reviewed in EPIC    OBJECTIVE:                                                    GENERAL: healthy, alert and no distress  EYES: Eyes grossly normal to inspection, and conjunctivae and sclerae normal  RESP: breathing comfortably on room air  SKIN: no suspicious lesions or rashes  NEURO: mentation intact and speech normal  PSYCH: mentation appears normal, affect normal       ASSESSMENT AND PLAN:                                                      Latent TB   - started Rifampin after 5/21/24 but complains of nausea, vomiting and epigastric pain   GERD and Epigastric pain, possibly gastritis   - better with omperazole   Migraines   Hypertension - under control     Recommendations  - stop Rifampin  - continue with daily Omeprazole. Follow-up with PCP. if not better, will need to check H.pylori stool test ( but will need to hold PPI 7-14 days) and referral for endoscopy   - avoid NSAID. History of NSAID use for migraines   - check CMP,  CBC today     She is advised to follow-up when she feels better. will retry rifampin / INH at that time.       Joel Callejas MD, M.Med.Sc  Division of Infectious Diseases and International Medicine  [facility]      35 Minutes spent by me on the date of service doing chart review, history, exam, documentation, coordination of care and further activities per the note

## 2024-06-19 ENCOUNTER — LAB (OUTPATIENT)
Dept: LAB | Facility: CLINIC | Age: 34
End: 2024-06-19
Payer: COMMERCIAL

## 2024-06-19 DIAGNOSIS — R10.13 EPIGASTRIC ABDOMINAL PAIN: ICD-10-CM

## 2024-06-19 DIAGNOSIS — Z51.81 ENCOUNTER FOR MEDICATION MONITORING: ICD-10-CM

## 2024-06-19 LAB
BASOPHILS # BLD AUTO: 0 10E3/UL (ref 0–0.2)
BASOPHILS NFR BLD AUTO: 0 %
EOSINOPHIL # BLD AUTO: 0 10E3/UL (ref 0–0.7)
EOSINOPHIL NFR BLD AUTO: 1 %
ERYTHROCYTE [DISTWIDTH] IN BLOOD BY AUTOMATED COUNT: 12.8 % (ref 10–15)
HCT VFR BLD AUTO: 36 % (ref 35–47)
HGB BLD-MCNC: 11.8 G/DL (ref 11.7–15.7)
IMM GRANULOCYTES # BLD: 0 10E3/UL
IMM GRANULOCYTES NFR BLD: 0 %
LYMPHOCYTES # BLD AUTO: 1.7 10E3/UL (ref 0.8–5.3)
LYMPHOCYTES NFR BLD AUTO: 54 %
MCH RBC QN AUTO: 30 PG (ref 26.5–33)
MCHC RBC AUTO-ENTMCNC: 32.8 G/DL (ref 31.5–36.5)
MCV RBC AUTO: 92 FL (ref 78–100)
MONOCYTES # BLD AUTO: 0.2 10E3/UL (ref 0–1.3)
MONOCYTES NFR BLD AUTO: 7 %
NEUTROPHILS # BLD AUTO: 1.2 10E3/UL (ref 1.6–8.3)
NEUTROPHILS NFR BLD AUTO: 38 %
NRBC # BLD AUTO: 0 10E3/UL
NRBC BLD AUTO-RTO: 0 /100
PLATELET # BLD AUTO: 247 10E3/UL (ref 150–450)
RBC # BLD AUTO: 3.93 10E6/UL (ref 3.8–5.2)
WBC # BLD AUTO: 3.1 10E3/UL (ref 4–11)

## 2024-06-19 PROCEDURE — 80053 COMPREHEN METABOLIC PANEL: CPT

## 2024-06-19 PROCEDURE — 85025 COMPLETE CBC W/AUTO DIFF WBC: CPT

## 2024-06-19 PROCEDURE — 87338 HPYLORI STOOL AG IA: CPT

## 2024-06-19 PROCEDURE — 36415 COLL VENOUS BLD VENIPUNCTURE: CPT

## 2024-06-20 LAB
ALBUMIN SERPL BCG-MCNC: 4.5 G/DL (ref 3.5–5.2)
ALP SERPL-CCNC: 49 U/L (ref 40–150)
ALT SERPL W P-5'-P-CCNC: 40 U/L (ref 0–50)
ANION GAP SERPL CALCULATED.3IONS-SCNC: 8 MMOL/L (ref 7–15)
AST SERPL W P-5'-P-CCNC: 34 U/L (ref 0–45)
BILIRUB SERPL-MCNC: 0.2 MG/DL
BUN SERPL-MCNC: 13.4 MG/DL (ref 6–20)
CALCIUM SERPL-MCNC: 9.1 MG/DL (ref 8.6–10)
CHLORIDE SERPL-SCNC: 106 MMOL/L (ref 98–107)
CREAT SERPL-MCNC: 0.97 MG/DL (ref 0.51–0.95)
DEPRECATED HCO3 PLAS-SCNC: 26 MMOL/L (ref 22–29)
EGFRCR SERPLBLD CKD-EPI 2021: 78 ML/MIN/1.73M2
GLUCOSE SERPL-MCNC: 72 MG/DL (ref 70–99)
H PYLORI AG STL QL IA: NEGATIVE
POTASSIUM SERPL-SCNC: 4.1 MMOL/L (ref 3.4–5.3)
PROT SERPL-MCNC: 7.7 G/DL (ref 6.4–8.3)
SODIUM SERPL-SCNC: 140 MMOL/L (ref 135–145)

## 2024-07-26 DIAGNOSIS — G44.89 HEADACHE SYNDROME: ICD-10-CM

## 2024-07-26 DIAGNOSIS — K21.00 GASTROESOPHAGEAL REFLUX DISEASE WITH ESOPHAGITIS WITHOUT HEMORRHAGE: ICD-10-CM

## 2024-07-26 RX ORDER — TOPIRAMATE 25 MG/1
25 TABLET, FILM COATED ORAL DAILY
Qty: 60 TABLET | Refills: 0 | Status: SHIPPED | OUTPATIENT
Start: 2024-07-26

## 2024-08-12 ENCOUNTER — OFFICE VISIT (OUTPATIENT)
Dept: FAMILY MEDICINE | Facility: CLINIC | Age: 34
End: 2024-08-12
Attending: STUDENT IN AN ORGANIZED HEALTH CARE EDUCATION/TRAINING PROGRAM
Payer: COMMERCIAL

## 2024-08-12 VITALS
SYSTOLIC BLOOD PRESSURE: 130 MMHG | BODY MASS INDEX: 27.23 KG/M2 | HEIGHT: 62 IN | WEIGHT: 148 LBS | DIASTOLIC BLOOD PRESSURE: 84 MMHG | OXYGEN SATURATION: 100 % | TEMPERATURE: 98.2 F | HEART RATE: 54 BPM

## 2024-08-12 DIAGNOSIS — R10.13 EPIGASTRIC PAIN: ICD-10-CM

## 2024-08-12 DIAGNOSIS — D64.9 NORMOCYTIC ANEMIA: ICD-10-CM

## 2024-08-12 DIAGNOSIS — R42 DIZZINESS: ICD-10-CM

## 2024-08-12 DIAGNOSIS — K21.00 GASTROESOPHAGEAL REFLUX DISEASE WITH ESOPHAGITIS WITHOUT HEMORRHAGE: ICD-10-CM

## 2024-08-12 DIAGNOSIS — Z00.00 ROUTINE GENERAL MEDICAL EXAMINATION AT A HEALTH CARE FACILITY: Primary | ICD-10-CM

## 2024-08-12 DIAGNOSIS — G43.709 CHRONIC MIGRAINE WITHOUT AURA WITHOUT STATUS MIGRAINOSUS, NOT INTRACTABLE: ICD-10-CM

## 2024-08-12 DIAGNOSIS — I10 ESSENTIAL HYPERTENSION: ICD-10-CM

## 2024-08-12 DIAGNOSIS — Z22.7 LATENT TUBERCULOSIS: ICD-10-CM

## 2024-08-12 LAB
BASOPHILS # BLD AUTO: 0 10E3/UL (ref 0–0.2)
BASOPHILS NFR BLD AUTO: 0 %
CHOLEST SERPL-MCNC: 109 MG/DL
EOSINOPHIL # BLD AUTO: 0 10E3/UL (ref 0–0.7)
EOSINOPHIL NFR BLD AUTO: 1 %
ERYTHROCYTE [DISTWIDTH] IN BLOOD BY AUTOMATED COUNT: 13 % (ref 10–15)
FASTING STATUS PATIENT QL REPORTED: YES
HCT VFR BLD AUTO: 34.5 % (ref 35–47)
HDLC SERPL-MCNC: 53 MG/DL
HGB BLD-MCNC: 11.5 G/DL (ref 11.7–15.7)
IMM GRANULOCYTES # BLD: 0 10E3/UL
IMM GRANULOCYTES NFR BLD: 0 %
LDLC SERPL CALC-MCNC: 50 MG/DL
LYMPHOCYTES # BLD AUTO: 1.7 10E3/UL (ref 0.8–5.3)
LYMPHOCYTES NFR BLD AUTO: 46 %
MCH RBC QN AUTO: 30.3 PG (ref 26.5–33)
MCHC RBC AUTO-ENTMCNC: 33.3 G/DL (ref 31.5–36.5)
MCV RBC AUTO: 91 FL (ref 78–100)
MONOCYTES # BLD AUTO: 0.2 10E3/UL (ref 0–1.3)
MONOCYTES NFR BLD AUTO: 6 %
NEUTROPHILS # BLD AUTO: 1.7 10E3/UL (ref 1.6–8.3)
NEUTROPHILS NFR BLD AUTO: 47 %
NONHDLC SERPL-MCNC: 56 MG/DL
PLATELET # BLD AUTO: 225 10E3/UL (ref 150–450)
RBC # BLD AUTO: 3.79 10E6/UL (ref 3.8–5.2)
TRIGL SERPL-MCNC: 29 MG/DL
WBC # BLD AUTO: 3.7 10E3/UL (ref 4–11)

## 2024-08-12 PROCEDURE — 36415 COLL VENOUS BLD VENIPUNCTURE: CPT | Performed by: STUDENT IN AN ORGANIZED HEALTH CARE EDUCATION/TRAINING PROGRAM

## 2024-08-12 PROCEDURE — 80061 LIPID PANEL: CPT | Performed by: STUDENT IN AN ORGANIZED HEALTH CARE EDUCATION/TRAINING PROGRAM

## 2024-08-12 PROCEDURE — 99395 PREV VISIT EST AGE 18-39: CPT | Performed by: STUDENT IN AN ORGANIZED HEALTH CARE EDUCATION/TRAINING PROGRAM

## 2024-08-12 PROCEDURE — 85025 COMPLETE CBC W/AUTO DIFF WBC: CPT | Performed by: STUDENT IN AN ORGANIZED HEALTH CARE EDUCATION/TRAINING PROGRAM

## 2024-08-12 PROCEDURE — 99214 OFFICE O/P EST MOD 30 MIN: CPT | Mod: 25 | Performed by: STUDENT IN AN ORGANIZED HEALTH CARE EDUCATION/TRAINING PROGRAM

## 2024-08-12 RX ORDER — OMEPRAZOLE 40 MG/1
40 CAPSULE, DELAYED RELEASE ORAL DAILY
Qty: 90 CAPSULE | Refills: 0 | OUTPATIENT
Start: 2024-08-12

## 2024-08-12 RX ORDER — PANTOPRAZOLE SODIUM 40 MG/1
40 TABLET, DELAYED RELEASE ORAL DAILY
Qty: 30 TABLET | Refills: 0 | Status: SHIPPED | OUTPATIENT
Start: 2024-08-12 | End: 2024-08-26

## 2024-08-12 RX ORDER — METOPROLOL SUCCINATE 25 MG/1
25 TABLET, EXTENDED RELEASE ORAL DAILY
Qty: 90 TABLET | Refills: 3 | Status: SHIPPED | OUTPATIENT
Start: 2024-08-12

## 2024-08-12 SDOH — HEALTH STABILITY: PHYSICAL HEALTH: ON AVERAGE, HOW MANY DAYS PER WEEK DO YOU ENGAGE IN MODERATE TO STRENUOUS EXERCISE (LIKE A BRISK WALK)?: 3 DAYS

## 2024-08-12 SDOH — HEALTH STABILITY: PHYSICAL HEALTH: ON AVERAGE, HOW MANY MINUTES DO YOU ENGAGE IN EXERCISE AT THIS LEVEL?: 20 MIN

## 2024-08-12 ASSESSMENT — SOCIAL DETERMINANTS OF HEALTH (SDOH): HOW OFTEN DO YOU GET TOGETHER WITH FRIENDS OR RELATIVES?: ONCE A WEEK

## 2024-08-12 ASSESSMENT — PAIN SCALES - GENERAL: PAINLEVEL: MILD PAIN (2)

## 2024-08-12 NOTE — PROGRESS NOTES
Preventive Care Visit  [facility]  Estevan Shay MD, Family Practice  Aug 12, 2024    Assessment & Plan     Routine general medical examination at a health care facility  Fasting thus will obtain lipid today. Reviewed recent CBC, CMP completed on 6/2024. Pap UTD. Discussed vaccination recommendations. She notes that when she moved from [place] in 2013, she underwent several vaccinations. She is unsure if she had hepB series but does have documentation at home. Plan to review, MyChart with records.  - Lipid panel reflex to direct LDL Fasting    Epigastric pain  Gastroesophageal reflux disease with esophagitis without hemorrhage  Ongoing since starting rifampin around 1/2024. Has since stopped this medication about 1 month ago. Use of 40mg omeprazole is somewhat helpful but not completely alleviating symptoms. Prior CMP unremarkable. CBC today shows mild leukopenia and ongoing anemia. Plan for EGD. In meantime, can trial pantoprazole instead.  - Adult GI  Referral - Procedure Only  - CBC with platelets and differential  - 40 mg pantoprazole daily    Dizziness  Ongoing for about one month. Associated with sitting to standing and prolonged standing. Never occurring at rest. No other associated symptoms. Is on metoprolol for BP which is likely the culprit. Plan to decrease to 25mg metoprolol, follow up in 2 weeks to reassess.  - metoprolol succinate ER (TOPROL XL) 25 MG 24 hr tablet  Dispense: 90 tablet; Refill: 3  - CBC with platelets and differential    Essential hypertension  Currently well controlled on 50mg metoprolol daily, decreasing dose, see above. Plan to follow up in 2 weeks for repeat BP.   - metoprolol succinate ER (TOPROL XL) 25 MG 24 hr tablet  Dispense: 90 tablet; Refill: 3    Normocytic anemia  Ongoing since 2015 per chart review but now with new mild persistent leukopenia, ongoing since 1/2024. Importantly, this started prior to initiation of rifampin. Plan to obtain  "blood smear at next appt. Need to monitor closely and consider referral to hematology as needed.     Chronic migraine without aura without status migrainosus, not intractable  Well controlled on topiramate. Discussed teratogenicity of medication. She does not desire pregnancy and is using protection at all times. Understands risks associated with this.     Latent tuberculosis  Following with ID. Trial of rifampin causing worsened epigastric pain/GERD symptoms. Plan to workup and treat GERD as above and then have patient revisit with ID for retrial of rifampin.     BMI  Estimated body mass index is 27.07 kg/m  as calculated from the following:    Height as of this encounter: 1.575 m (5' 2\").    Weight as of this encounter: 67.1 kg (148 lb).     Counseling  Appropriate preventive services were discussed with this patient.  Checklist reviewing preventive services available has been given to the patient.    Follow up in 2 weeks for BP recheck, dizziness reassessment and blood smear    Estevan Shay MD  [facility]  8/12/2024    Viji Doan is a 34 year old, presenting for the following:  Physical        8/12/2024     8:17 AM   Additional Questions   Roomed by ruth mendez   Accompanied by self         8/12/2024     8:17 AM   Patient Reported Additional Medications   Patient reports taking the following new medications na        Health Care Directive  Patient does not have a Health Care Directive or Living Will: Discussed advance care planning with patient; however, patient declined at this time.    HPI    Episodes of dizziness  Ongoing for the last month.   Used to have this in the past when iron is low and this feels similar.   Since last month, is worsening.   Does occur sometimes when going from sitting to standing.   Sometimes when standing or making food for kids.  Doesn't feel like whole room is spinning.  Never occurs with laying down.   Has never passed out.   No palpitations. No episodes of " chest pain during these episodes. No PAIGE.  No numbness or tingling in extremities.  No blood in stools.     GERD  Sometimes pain in the upper abdomen spanning across the region and to the back.   Also comes up the mid-chest. Does get worse with certain foods like tomatoes.   Drinking milk helps alleviate this pain. No blood in stools. Not sticky, dark or tarry stools.   The omeprazole helped a little but it didn't fully resolve her GERD/abdominal pain..   This has been intermittent, started after rifampin.   Hasn't been taking this medication for at least the past month though.   Has been out of the omeprazole for the past 3 days.   No diarrhea or constipation.  No dysphagia at all.     Hx of headaches  Still taking the topiramate for headaches.  This has been very helpful.   Not looking to get pregnant, using condoms consistently.        8/12/2024   General Health   How would you rate your overall physical health? (!) FAIR   Feel stress (tense, anxious, or unable to sleep) Only a little      (!) STRESS CONCERN      8/12/2024   Nutrition   Three or more servings of calcium each day? Yes   Diet: Breakfast skipped   How many servings of fruit and vegetables per day? (!) 2-3   How many sweetened beverages each day? 0-1            8/12/2024   Exercise   Days per week of moderate/strenous exercise 3 days   Average minutes spent exercising at this level 20 min            8/12/2024   Social Factors   Frequency of gathering with friends or relatives Once a week   Worry food won't last until get money to buy more No   Food not last or not have enough money for food? No   Do you have housing? (Housing is defined as stable permanent housing and does not include staying ouside in a car, in a tent, in an abandoned building, in an overnight shelter, or couch-surfing.) No   Are you worried about losing your housing? No   Lack of transportation? No   Unable to get utilities (heat,electricity)? No   Want help with housing or utility  "concern? No      (!) HOUSING CONCERN PRESENT      8/12/2024   Dental   Dentist two times every year? Yes          8/12/2024   Substance Use   Alcohol more than 3/day or more than 7/wk No   Do you use any other substances recreationally? No      Social History     Tobacco Use    Smoking status: Never    Smokeless tobacco: Never   Vaping Use    Vaping status: Never Used   Substance Use Topics    Alcohol use: No     Alcohol/week: 0.0 standard drinks of alcohol    Drug use: No     Mammogram Screening - Patient under 40 years of age: Routine Mammogram Screening not recommended.         8/12/2024   STI Screening   New sexual partner(s) since last STI/HIV test? No          Latest Ref Rng & Units 6/16/2023     7:27 AM 11/21/2018     3:31 PM 11/21/2018     3:15 PM   PAP / HPV   PAP  Negative for Intraepithelial Lesion or Malignancy (NILM)      PAP (Historical)   NIL     HPV 16 DNA Negative Negative   Negative    HPV 18 DNA Negative Negative   Negative    Other HR HPV Negative Negative   Negative            8/12/2024   Contraception/Family Planning   Questions about contraception or family planning No     Reviewed and updated as needed this visit by Provider   Tobacco  Allergies     Surg Hx                Objective    Exam  /84   Pulse 54   Temp 98.2  F (36.8  C) (Oral)   Ht 1.575 m (5' 2\")   Wt 67.1 kg (148 lb)   LMP 08/10/2024 (Approximate)   SpO2 100%   BMI 27.07 kg/m     Estimated body mass index is 27.07 kg/m  as calculated from the following:    Height as of this encounter: 1.575 m (5' 2\").    Weight as of this encounter: 67.1 kg (148 lb).    Physical Exam  GENERAL: healthy, alert and no distress  HEAD: Normocephalic, atraumatic.   EYES: PERRL. Normal conjunctivae, sclera.   ENT: Normal EAC and TMs bilaterally. Normal oropharynx.   NECK: Supple. No lymphadenopathy appreciated. Trachea midline. Thyroid not enlarged, not TTP.  RESP: lungs clear to auscultation - no rales, rhonchi or wheezes  CV: regular " rate and rhythm, normal S1 S2, no murmur, click, rub or gallop.  No peripheral swelling noted.   ABDOMEN: soft, mild TTP over epigastric region. No guarding or rebound tenderness present.  No hepatomegaly or masses appreciated. BS normactive.  MSK: no gross musculoskeletal defects noted.  SKIN: no suspicious lesions or rashes.  EXT: Warm and well perfused. DP pulses 2+ bilaterally.  NEURO: CNII-XII grossly intact. No focal deficits.  PSYCH: Groomed, dressed appropriately for weather. Normal mood with consistent affect.     Signed Electronically by: Estevan Shay MD

## 2024-08-12 NOTE — TELEPHONE ENCOUNTER
Please let patient know that we will trial different GERD medication for her called pantoprazole instead of her omeprazole. This was sent earlier today already.    Will deny this refill.    Thanks,    Estevan Shay MD  [facility]  8/12/2024

## 2024-08-12 NOTE — PATIENT INSTRUCTIONS
Decrease to 25mg daily metoprolol as this may be contributing to your dizziness.       Patient Education   Preventive Care Advice   This is general advice given by our system to help you stay healthy. However, your care team may have specific advice just for you. Please talk to your care team about your preventive care needs.  Nutrition  Eat 5 or more servings of fruits and vegetables each day.  Try wheat bread, brown rice and whole grain pasta (instead of white bread, rice, and pasta).  Get enough calcium and vitamin D. Check the label on foods and aim for 100% of the RDA (recommended daily allowance).  Lifestyle  Exercise at least 150 minutes each week  (30 minutes a day, 5 days a week).  Do muscle strengthening activities 2 days a week. These help control your weight and prevent disease.  No smoking.  Wear sunscreen to prevent skin cancer.  Have a dental exam and cleaning every 6 months.  Yearly exams  See your health care team every year to talk about:  Any changes in your health.  Any medicines your care team has prescribed.  Preventive care, family planning, and ways to prevent chronic diseases.  Shots (vaccines)   HPV shots (up to age 26), if you've never had them before.  Hepatitis B shots (up to age 59), if you've never had them before.  COVID-19 shot: Get this shot when it's due.  Flu shot: Get a flu shot every year.  Tetanus shot: Get a tetanus shot every 10 years.  Pneumococcal, hepatitis A, and RSV shots: Ask your care team if you need these based on your risk.  Shingles shot (for age 50 and up)  General health tests  Diabetes screening:  Starting at age 35, Get screened for diabetes at least every 3 years.  If you are younger than age 35, ask your care team if you should be screened for diabetes.  Cholesterol test: At age 39, start having a cholesterol test every 5 years, or more often if advised.  Bone density scan (DEXA): At age 50, ask your care team if you should have this scan for osteoporosis  (brittle bones).  Hepatitis C: Get tested at least once in your life.  STIs (sexually transmitted infections)  Before age 24: Ask your care team if you should be screened for STIs.  After age 24: Get screened for STIs if you're at risk. You are at risk for STIs (including HIV) if:  You are sexually active with more than one person.  You don't use condoms every time.  You or a partner was diagnosed with a sexually transmitted infection.  If you are at risk for HIV, ask about PrEP medicine to prevent HIV.  Get tested for HIV at least once in your life, whether you are at risk for HIV or not.  Cancer screening tests  Cervical cancer screening: If you have a cervix, begin getting regular cervical cancer screening tests starting at age 21.  Breast cancer scan (mammogram): If you've ever had breasts, begin having regular mammograms starting at age 40. This is a scan to check for breast cancer.  Colon cancer screening: It is important to start screening for colon cancer at age 45.  Have a colonoscopy test every 10 years (or more often if you're at risk) Or, ask your provider about stool tests like a FIT test every year or Cologuard test every 3 years.  To learn more about your testing options, visit:   .  For help making a decision, visit:   https://bit.ly/ag00545.  Prostate cancer screening test: If you have a prostate, ask your care team if a prostate cancer screening test (PSA) at age 55 is right for you.  Lung cancer screening: If you are a current or former smoker ages 50 to 80, ask your care team if ongoing lung cancer screenings are right for you.  For informational purposes only. Not to replace the advice of your health care provider. Copyright   2023 [employer]. All rights reserved. Clinically reviewed by the [facility] Transitions Program. [employer] 358673 - REV 01/24.     Patient Education   Preventive Care Advice   This is general advice given by our system to help you stay healthy.  However, your care team may have specific advice just for you. Please talk to your care team about your preventive care needs.  Nutrition  Eat 5 or more servings of fruits and vegetables each day.  Try wheat bread, brown rice and whole grain pasta (instead of white bread, rice, and pasta).  Get enough calcium and vitamin D. Check the label on foods and aim for 100% of the RDA (recommended daily allowance).  Lifestyle  Exercise at least 150 minutes each week  (30 minutes a day, 5 days a week).  Do muscle strengthening activities 2 days a week. These help control your weight and prevent disease.  No smoking.  Wear sunscreen to prevent skin cancer.  Have a dental exam and cleaning every 6 months.  Yearly exams  See your health care team every year to talk about:  Any changes in your health.  Any medicines your care team has prescribed.  Preventive care, family planning, and ways to prevent chronic diseases.  Shots (vaccines)   HPV shots (up to age 26), if you've never had them before.  Hepatitis B shots (up to age 59), if you've never had them before.  COVID-19 shot: Get this shot when it's due.  Flu shot: Get a flu shot every year.  Tetanus shot: Get a tetanus shot every 10 years.  Pneumococcal, hepatitis A, and RSV shots: Ask your care team if you need these based on your risk.  Shingles shot (for age 50 and up)  General health tests  Diabetes screening:  Starting at age 35, Get screened for diabetes at least every 3 years.  If you are younger than age 35, ask your care team if you should be screened for diabetes.  Cholesterol test: At age 39, start having a cholesterol test every 5 years, or more often if advised.  Bone density scan (DEXA): At age 50, ask your care team if you should have this scan for osteoporosis (brittle bones).  Hepatitis C: Get tested at least once in your life.  STIs (sexually transmitted infections)  Before age 24: Ask your care team if you should be screened for STIs.  After age 24: Get  screened for STIs if you're at risk. You are at risk for STIs (including HIV) if:  You are sexually active with more than one person.  You don't use condoms every time.  You or a partner was diagnosed with a sexually transmitted infection.  If you are at risk for HIV, ask about PrEP medicine to prevent HIV.  Get tested for HIV at least once in your life, whether you are at risk for HIV or not.  Cancer screening tests  Cervical cancer screening: If you have a cervix, begin getting regular cervical cancer screening tests starting at age 21.  Breast cancer scan (mammogram): If you've ever had breasts, begin having regular mammograms starting at age 40. This is a scan to check for breast cancer.  Colon cancer screening: It is important to start screening for colon cancer at age 45.  Have a colonoscopy test every 10 years (or more often if you're at risk) Or, ask your provider about stool tests like a FIT test every year or Cologuard test every 3 years.  To learn more about your testing options, visit:   .  For help making a decision, visit:   https://bit.ly/wa05925.  Prostate cancer screening test: If you have a prostate, ask your care team if a prostate cancer screening test (PSA) at age 55 is right for you.  Lung cancer screening: If you are a current or former smoker ages 50 to 80, ask your care team if ongoing lung cancer screenings are right for you.  For informational purposes only. Not to replace the advice of your health care provider. Copyright   2023 [facility] [employer]. All rights reserved. Clinically reviewed by the [facility] Transitions Program. [employer] 152148 - REV 01/24.

## 2024-08-14 NOTE — TELEPHONE ENCOUNTER
Pt notified of message from provider. No further questions at this time.    Joy Esquivel RN on 8/14/2024 at 2:07 PM

## 2024-08-26 ENCOUNTER — TELEPHONE (OUTPATIENT)
Dept: FAMILY MEDICINE | Facility: CLINIC | Age: 34
End: 2024-08-26

## 2024-08-26 ENCOUNTER — VIRTUAL VISIT (OUTPATIENT)
Dept: FAMILY MEDICINE | Facility: CLINIC | Age: 34
End: 2024-08-26
Payer: COMMERCIAL

## 2024-08-26 DIAGNOSIS — D72.819 LEUKOPENIA, UNSPECIFIED TYPE: ICD-10-CM

## 2024-08-26 DIAGNOSIS — D64.9 NORMOCYTIC ANEMIA: ICD-10-CM

## 2024-08-26 DIAGNOSIS — R42 DIZZINESS: Primary | ICD-10-CM

## 2024-08-26 DIAGNOSIS — R10.13 EPIGASTRIC PAIN: ICD-10-CM

## 2024-08-26 DIAGNOSIS — I10 ESSENTIAL HYPERTENSION: ICD-10-CM

## 2024-08-26 PROCEDURE — 99214 OFFICE O/P EST MOD 30 MIN: CPT | Mod: 95 | Performed by: STUDENT IN AN ORGANIZED HEALTH CARE EDUCATION/TRAINING PROGRAM

## 2024-08-26 RX ORDER — PANTOPRAZOLE SODIUM 20 MG/1
40 TABLET, DELAYED RELEASE ORAL 2 TIMES DAILY
Qty: 60 TABLET | Refills: 0 | Status: SHIPPED | OUTPATIENT
Start: 2024-08-26

## 2024-08-26 NOTE — TELEPHONE ENCOUNTER
Reason for Call:  Appointment Request    Patient requesting this type of appt: Chronic Diease Management/Medication/Follow-Up    Requested provider:  Estevan Shay MD    Reason patient unable to be scheduled:  Unable to change mode from in person to virtual for same provider and time/date.    When does patient want to be seen/preferred time: Same day    Comments: Pt has an in person appt at 9:30am today and was looking to get it switched to a virtual appt due to transportation issues if possible.    Could we send this information to you in [employer] or would you prefer to receive a phone call?:   Patient would prefer a phone call   Okay to leave a detailed message?: Yes at Cell number on file:    Telephone Information:   Mobile [phone]       Call taken on 8/26/2024 at 6:45 AM by CAROLEE Pinon

## 2024-08-26 NOTE — PROGRESS NOTES
"Olimpia is a 34 year old who is being evaluated via a billable video visit.      Assessment & Plan     Dizziness  Resolved with decreasing to metoprolol 25 mg daily from 50mg daily.     Hypertension  Patient needed to switch visit from in person to virtual thus unable to repeat BP after metoprolol adjustment as above. Plan for her to set up nurse only BP check in the next couple of days. If still elevated, need to add additional agent, hydrochlorothiazide.     Epigastric pain  No change despite changing PPI. Plan to split into BID dosing while awaiting EGD and results. If unremarkable, consider CT abd/pelv and/or refer to GI for ongoing workup/management.  - lipase    Below is last A/P:  Ongoing since starting rifampin around 1/2024. Has since stopped this medication about 1 month ago. Use of 40mg omeprazole is somewhat helpful but not completely alleviating symptoms. Prior CMP unremarkable. CBC today shows mild leukopenia and ongoing anemia. Plan for EGD. In meantime, can trial pantoprazole instead.     - pantoprazole (PROTONIX) 20 MG EC tablet  Dispense: 60 tablet; Refill: 0    Normocytic anemia  Leukopenia, unspecified type  - Lab Blood Morphology Pathologist Review     BMI  Estimated body mass index is 27.07 kg/m  as calculated from the following:    Height as of 8/12/24: 1.575 m (5' 2\").    Weight as of 8/12/24: 67.1 kg (148 lb).     Follow up in 1-2 weeks for nurse only BP check    Estevan Shay MD  [facility]  8/26/2024      Viji Doan is a 34 year old, presenting for the following health issues:  Dizziness      8/26/2024     9:06 AM   Additional Questions   Roomed by ruth mendez   Accompanied by self         8/26/2024     9:06 AM   Patient Reported Additional Medications   Patient reports taking the following new medications na     Video Start Time: 9:33 AM    History of Present Illness       Reason for visit:  DIzziness    She eats 0-1 servings of fruits and vegetables daily.She " consumes 0 sweetened beverage(s) daily.She exercises with enough effort to increase her heart rate 9 or less minutes per day.  She exercises with enough effort to increase her heart rate 3 or less days per week.   She is taking medications regularly.    Dizziness  Since decreasing her metoprolol dosage, not noticing any dizziness at all anymore.    Epigastric pain  Still having heart burn even after switching the medication.  Nightly/evening occurrence.   Did set up EGD but needs to determine if insurance will cover this.  Symptoms are staying the same. Not worsening.   Does have some nausea with this but no vomiting.  Doesn't occur with exertion.        Objective         Vitals:  No vitals were obtained today due to virtual visit.    Physical Exam   GENERAL: alert and no distress  EYES: Eyes grossly normal to inspection.  No discharge or erythema, or obvious scleral/conjunctival abnormalities.  RESP: No audible wheeze, cough, or visible cyanosis.    SKIN: Visible skin clear. No significant rash, abnormal pigmentation or lesions.  NEURO: Cranial nerves grossly intact.  Mentation and speech appropriate for age.  PSYCH: Appropriate affect, tone, and pace of words    Video-Visit Details    Type of service:  Video Visit   Video End Time:9:46 AM  Originating Location (pt. Location): Home  Distant Location (provider location):  On-site  Platform used for Video Visit: Ant  Signed Electronically by: Estevan Shay MD

## 2024-09-05 ENCOUNTER — TELEPHONE (OUTPATIENT)
Dept: GASTROENTEROLOGY | Facility: CLINIC | Age: 34
End: 2024-09-05
Payer: COMMERCIAL

## 2024-09-05 NOTE — TELEPHONE ENCOUNTER
"Endoscopy Scheduling Screen    Have you had a positive Covid test in the last 14 days?  No    What is your communication preference for Instructions and/or Bowel Prep?   MyChart    What insurance is in the chart?  Other:  [facility]    Ordering/Referring Provider: MARIAJOSE KEITH    (If ordering provider performs procedure, schedule with ordering provider unless otherwise instructed. )    BMI: Estimated body mass index is 27.07 kg/m  as calculated from the following:    Height as of 8/12/24: 1.575 m (5' 2\").    Weight as of 8/12/24: 67.1 kg (148 lb).     Sedation Ordered  moderate sedation.   If patient BMI > 50 do not schedule in [facility].    If patient BMI > 45 do not schedule at [facility].    Are you taking methadone or Suboxone?  Yes   Patient must be scheduled with MAC sedation.    Have you had difficulties, pain, or discomfort during past endoscopy procedures?  No    Are you taking any prescription medications for pain 3 or more times per week?   NO, No RN review required.    Do you have a history of malignant hyperthermia?  No    (Females) Are you currently pregnant?   No     Have you been diagnosed or told you have pulmonary hypertension?   No    Do you have an LVAD?  No    Have you been told you have moderate to severe sleep apnea?  No    Have you been told you have COPD, asthma, or any other lung disease?  No    Do you have any heart conditions?  No     Have you ever had or are you waiting for an organ transplant?  No. Continue scheduling, no site restrictions.    Have you had a stroke or transient ischemic attack (TIA aka \"mini stroke\" in the last 6 months?   No    Have you been diagnosed with or been told you have cirrhosis of the liver?   No    Are you currently on dialysis?   No    Do you need assistance transferring?   No    BMI: Estimated body mass index is 27.07 kg/m  as calculated from the following:    Height as of 8/12/24: 1.575 m (5' 2\").    Weight as of 8/12/24: 67.1 kg (148 lb).     Is patients BMI > 40 " and scheduling location [facility]?  No    Do you take an injectable medication for weight loss or diabetes (excluding insulin)?  No    Do you take the medication Naltrexone?  No    Do you take blood thinners?  No       Prep   Are you currently on dialysis or do you have chronic kidney disease?  No    Do you have a diagnosis of diabetes?  No    Do you have a diagnosis of cystic fibrosis (CF)?  No    On a regular basis do you go 3 -5 days between bowel movements?  No    BMI > 40?  No    Preferred Pharmacy:    [id number] [place], [place] - [address] [place] [address]  [address] [place] [address]  Phone: [phone] Fax: [phone]      Final Scheduling Details     Procedure scheduled  Upper endoscopy (EGD)    Surgeon:  PERLA     Date of procedure:  9/10/24     Pre-OP / PAC:   No - Not required for this site.    Location  [facility] - [facility] - Patient preference.    Sedation   MAC/Deep Sedation - Per exclusion criteria.      Patient Reminders:   You will receive a call from a Nurse to review instructions and health history.  This assessment must be completed prior to your procedure.  Failure to complete the Nurse assessment may result in the procedure being cancelled.      On the day of your procedure, please designate an adult(s) who can drive you home stay with you for the next 24 hours. The medicines used in the exam will make you sleepy. You will not be able to drive.      You cannot take public transportation, ride share services, or non-medical taxi service without a responsible caregiver.  Medical transport services are allowed with the requirement that a responsible caregiver will receive you at your destination.  We require that drivers and caregivers are confirmed prior to your procedure.

## 2024-09-05 NOTE — TELEPHONE ENCOUNTER
Pre Assessment RN Review    Focused Assessments    Pain Medication Review    Per scheduling questionnaire, patient reports taking prescription pain medication three or more times per week.    Per chart review, patient does have an active prescription for an opioid medication. Prescribed Medication(s): self-reports Suboxone and Methadone use. No info in chart, default to patient's word. Needs MAC.      Scheduling Status & Recommendations    Sedation: MAC/Deep Sedation per pt report

## 2024-09-05 NOTE — TELEPHONE ENCOUNTER
Attempted to contact patient in order to complete pre assessment questions.     No answer. Left message to return call to [phone] option 4    Callback required communication sent via [employer].      Procedure details:    Patient scheduled for Upper endoscopy (EGD) on 9/10/24.     Arrival time: 1030. Procedure time 1130    Facility location: [facility]; [address] [place] [address], 5[address] Floor, [place] [address]. Check in location: 5th Floor.    Sedation type: MAC    Pre op exam needed? No.    Indication for procedure:   Epigastric pain [R10.13]      Gastroesophageal reflux disease with esophagitis without hemorrhage            Chart review:     Electronic implanted devices? No    Recent diagnosis of diverticulitis within the last 6 weeks? No      Medication review:    Diabetic? No    Anticoagulants? No    Weight loss medication/injectable? No GLP-1 medication per patient's medication list.  RN will verify with pre-assessment call.    NSAIDS? No    Other medication HOLDING recommendations:  N/A      Prep for procedure:     Prep instructions sent via [employer].       Do Ivory RN  Endoscopy Procedure Pre Assessment

## 2024-09-09 RX ORDER — SODIUM CHLORIDE, SODIUM LACTATE, POTASSIUM CHLORIDE, CALCIUM CHLORIDE 600; 310; 30; 20 MG/100ML; MG/100ML; MG/100ML; MG/100ML
INJECTION, SOLUTION INTRAVENOUS CONTINUOUS
Status: CANCELLED | OUTPATIENT
Start: 2024-09-09

## 2024-09-09 RX ORDER — NALOXONE HYDROCHLORIDE 0.4 MG/ML
0.4 INJECTION, SOLUTION INTRAMUSCULAR; INTRAVENOUS; SUBCUTANEOUS
Status: CANCELLED | OUTPATIENT
Start: 2024-09-09

## 2024-09-09 RX ORDER — ONDANSETRON 4 MG/1
4 TABLET, ORALLY DISINTEGRATING ORAL EVERY 6 HOURS PRN
Status: CANCELLED | OUTPATIENT
Start: 2024-09-09

## 2024-09-09 RX ORDER — NALOXONE HYDROCHLORIDE 0.4 MG/ML
0.2 INJECTION, SOLUTION INTRAMUSCULAR; INTRAVENOUS; SUBCUTANEOUS
Status: CANCELLED | OUTPATIENT
Start: 2024-09-09

## 2024-09-09 RX ORDER — FLUMAZENIL 0.1 MG/ML
0.2 INJECTION, SOLUTION INTRAVENOUS
Status: CANCELLED | OUTPATIENT
Start: 2024-09-09 | End: 2024-09-10

## 2024-09-09 RX ORDER — ONDANSETRON 2 MG/ML
4 INJECTION INTRAMUSCULAR; INTRAVENOUS EVERY 6 HOURS PRN
Status: CANCELLED | OUTPATIENT
Start: 2024-09-09

## 2024-09-09 NOTE — TELEPHONE ENCOUNTER
Pre assessment completed for upcoming procedure.   (Please see previous telephone encounter notes for complete details)    Patient  returned call.       Procedure details:    Arrival time and facility location reviewed.    Pre op exam needed? No.    Designated  policy reviewed. Instructed to have someone stay 24  hours post procedure.       Medication review:    Medications reviewed. Please see supporting documentation below. Holding recommendations discussed (if applicable).       Prep for procedure:     Procedure prep instructions reviewed.        Any additional information needed:  N/A      Patient  verbalized understanding and had no questions or concerns at this time.      Katharine Goldstein RN  Endoscopy Procedure Pre Assessment   [phone] option 4

## 2024-09-10 ENCOUNTER — ANESTHESIA (OUTPATIENT)
Dept: SURGERY | Facility: AMBULATORY SURGERY CENTER | Age: 34
End: 2024-09-10
Payer: COMMERCIAL

## 2024-09-10 ENCOUNTER — ANESTHESIA EVENT (OUTPATIENT)
Dept: SURGERY | Facility: AMBULATORY SURGERY CENTER | Age: 34
End: 2024-09-10
Payer: COMMERCIAL

## 2024-09-10 ENCOUNTER — HOSPITAL ENCOUNTER (OUTPATIENT)
Facility: AMBULATORY SURGERY CENTER | Age: 34
Discharge: HOME | End: 2024-09-10
Attending: INTERNAL MEDICINE
Payer: COMMERCIAL

## 2024-09-10 VITALS — HEART RATE: 84 BPM

## 2024-09-10 VITALS
HEART RATE: 79 BPM | DIASTOLIC BLOOD PRESSURE: 82 MMHG | SYSTOLIC BLOOD PRESSURE: 128 MMHG | HEIGHT: 62 IN | TEMPERATURE: 97.6 F | BODY MASS INDEX: 27.23 KG/M2 | WEIGHT: 148 LBS | RESPIRATION RATE: 15 BRPM | OXYGEN SATURATION: 98 %

## 2024-09-10 LAB
HCG UR QL: NEGATIVE
INTERNAL QC OK POCT: NORMAL
POCT KIT EXPIRATION DATE: NORMAL
POCT KIT LOT NUMBER: NORMAL
UPPER GI ENDOSCOPY: NORMAL

## 2024-09-10 PROCEDURE — 88305 TISSUE EXAM BY PATHOLOGIST: CPT | Mod: TC | Performed by: INTERNAL MEDICINE

## 2024-09-10 PROCEDURE — 43239 EGD BIOPSY SINGLE/MULTIPLE: CPT | Performed by: NURSE ANESTHETIST, CERTIFIED REGISTERED

## 2024-09-10 PROCEDURE — 43239 EGD BIOPSY SINGLE/MULTIPLE: CPT | Performed by: ANESTHESIOLOGY

## 2024-09-10 PROCEDURE — 81025 URINE PREGNANCY TEST: CPT | Performed by: PATHOLOGY

## 2024-09-10 PROCEDURE — 88305 TISSUE EXAM BY PATHOLOGIST: CPT | Mod: 26 | Performed by: STUDENT IN AN ORGANIZED HEALTH CARE EDUCATION/TRAINING PROGRAM

## 2024-09-10 PROCEDURE — 43239 EGD BIOPSY SINGLE/MULTIPLE: CPT | Performed by: INTERNAL MEDICINE

## 2024-09-10 RX ORDER — PROPOFOL 10 MG/ML
INJECTION, EMULSION INTRAVENOUS CONTINUOUS PRN
Status: DISCONTINUED | OUTPATIENT
Start: 2024-09-10 | End: 2024-09-11

## 2024-09-10 RX ORDER — LIDOCAINE HYDROCHLORIDE 20 MG/ML
INJECTION, SOLUTION INFILTRATION; PERINEURAL PRN
Status: DISCONTINUED | OUTPATIENT
Start: 2024-09-10 | End: 2024-09-11

## 2024-09-10 RX ORDER — PROPOFOL 10 MG/ML
INJECTION, EMULSION INTRAVENOUS PRN
Status: DISCONTINUED | OUTPATIENT
Start: 2024-09-10 | End: 2024-09-11

## 2024-09-10 RX ORDER — ONDANSETRON 2 MG/ML
4 INJECTION INTRAMUSCULAR; INTRAVENOUS
Status: DISCONTINUED | OUTPATIENT
Start: 2024-09-10 | End: 2024-09-11 | Stop reason: HOSPADM

## 2024-09-10 RX ORDER — SODIUM CHLORIDE, SODIUM LACTATE, POTASSIUM CHLORIDE, CALCIUM CHLORIDE 600; 310; 30; 20 MG/100ML; MG/100ML; MG/100ML; MG/100ML
INJECTION, SOLUTION INTRAVENOUS CONTINUOUS PRN
Status: DISCONTINUED | OUTPATIENT
Start: 2024-09-10 | End: 2024-09-11

## 2024-09-10 RX ORDER — LIDOCAINE 40 MG/G
CREAM TOPICAL
Status: DISCONTINUED | OUTPATIENT
Start: 2024-09-10 | End: 2024-09-11 | Stop reason: HOSPADM

## 2024-09-10 RX ADMIN — PROPOFOL 50 MG: 10 INJECTION, EMULSION INTRAVENOUS at 11:38

## 2024-09-10 RX ADMIN — LIDOCAINE HYDROCHLORIDE 40 MG: 20 INJECTION, SOLUTION INFILTRATION; PERINEURAL at 11:36

## 2024-09-10 RX ADMIN — PROPOFOL 200 MCG/KG/MIN: 10 INJECTION, EMULSION INTRAVENOUS at 11:38

## 2024-09-10 RX ADMIN — SODIUM CHLORIDE, SODIUM LACTATE, POTASSIUM CHLORIDE, CALCIUM CHLORIDE: 600; 310; 30; 20 INJECTION, SOLUTION INTRAVENOUS at 11:26

## 2024-09-10 NOTE — ANESTHESIA POSTPROCEDURE EVALUATION
Patient: Rofina O Obioma    Procedure: Procedure(s):  ESOPHAGOGASTRODUODENOSCOPY, WITH BIOPSY       Anesthesia Type:  MAC    Note:  Disposition: Outpatient   Postop Pain Control: Uneventful            Sign Out: Well controlled pain   PONV: No   Neuro/Psych: Uneventful            Sign Out: Acceptable/Baseline neuro status   Airway/Respiratory: Uneventful            Sign Out: Acceptable/Baseline resp. status   CV/Hemodynamics: Uneventful            Sign Out: Acceptable CV status; No obvious hypovolemia; No obvious fluid overload   Other NRE: NONE   DID A NON-ROUTINE EVENT OCCUR?            Last vitals:  Vitals Value Taken Time   /82 09/10/24 1230   Temp     Pulse 79 09/10/24 1230   Resp 15 09/10/24 1230   SpO2 98 % 09/10/24 1230       Electronically Signed By: Brant Haywood MD  September 10, 2024  4:34 PM

## 2024-09-10 NOTE — DISCHARGE INSTRUCTIONS
Discharge Instructions after  Upper Endoscopy (EGD)    Activity and Diet  You were given medicine for pain. You may be dizzy or sleepy.  For 24 hours:   Do not drive or use heavy equipment.   Do not make important decisions.   Do not drink any alcohol.  ___ You may return to your regular diet.    Discomfort  You may have a sore throat for 2 to 3 days. It may help to:   Avoid hot liquids for 24 hours.   Use sore throat lozenges.   Gargle as needed with salt water up to 4 times a day. Mix 1 cup of warm water  with 1 teaspoon of salt. Do not swallow.  ___ Your esophagus was dilated (opened) or banded during the exam:   Drink only cool liquids for the rest of the day. Eat a soft diet for the next few days.   You may have a sore chest for 2 to 3 days.    You may take Tylenol (acetaminophen) for pain unless your doctor has told you not to.    Do not take aspirin or ibuprofen (Advil, Motrin) or other NSAIDS  (anti-inflammatory drugs) for ___ days.    Follow-up  ___ We took small tissue samples for study. If you do not have a follow-up visit scheduled,  call your provider s office in 2 weeks for the results.    Other instructions________________________________________________________    When to call us:  Problems are rare. Call right away if you have:   Unusual throat pain or trouble swallowing   Unusual pain in belly or chest that is not relieved by belching or passing air   Black stools (tar-like looking bowel movement)   Temperature above 100.6  F. (37.5  C).    If you vomit blood or have severe pain, go to an emergency room.    If you have questions, call:  Monday to Friday, 8 a.m. to 4:30 p.m.: Central Scheduling Department:[phone]    After hours: Hospital: [phone] (Ask for the GI fellow on call)

## 2024-09-10 NOTE — H&P
Lamarfina O Obioma  [id number]  female  34 year old      Reason for procedure/surgery: epigastric pain    Patient Active Problem List   Diagnosis    Eczema, dyshidrotic    Constipation    Migraine    Gastroesophageal reflux disease without esophagitis    Jennifer-Hall tear    Essential hypertension    Latent tuberculosis    Normocytic anemia       Past Surgical History:    Past Surgical History:   Procedure Laterality Date    APPENDECTOMY         Past Medical History:   Past Medical History:   Diagnosis Date    Anemia     Constipation     COVID     symptoms 10/26/21, pos test 10/27/21    Eczema, dyshidrotic     Hypertension in pregnancy, preeclampsia, severe, delivered 01/26/2022    Migraine     Migraines     Varicella        Social History:   Social History     Tobacco Use    Smoking status: Never    Smokeless tobacco: Never   Substance Use Topics    Alcohol use: No     Alcohol/week: 0.0 standard drinks of alcohol       Family History:   Family History   Problem Relation Age of Onset    Hypertension Mother     No Known Problems Sister     No Known Problems Sister     Hypertension Maternal Grandmother     Migraines No family hx of     Coronary Artery Disease No family hx of     Diabetes No family hx of     Breast Cancer No family hx of     Colon Cancer No family hx of        Allergies:   Allergies   Allergen Reactions    Compazine [Prochlorperazine] Swelling     Swelling of tongue    Zantac [Ranitidine] Other (See Comments)     IV Zantac caused : Anxiety       Active Medications:   Current Outpatient Medications   Medication Sig Dispense Refill    metoprolol succinate ER (TOPROL XL) 25 MG 24 hr tablet Take 1 tablet (25 mg) by mouth daily 90 tablet 3    pantoprazole (PROTONIX) 20 MG EC tablet Take 2 tablets (40 mg) by mouth 2 times daily. 60 tablet 0    topiramate (TOPAMAX) 25 MG tablet Take 1 tablet (25 mg) by mouth daily For 2 weeks then increase to 50 mg daily 60 tablet 0       Systemic Review:   ROS otherwise  "negative    Physical Examination:   Vital Signs: /88   Pulse 59   Temp 97.6  F (36.4  C) (Temporal)   Resp 16   Ht 1.575 m (5' 2.01\")   Wt 67.1 kg (148 lb)   LMP 08/10/2024 (Approximate)   SpO2 100%   BMI 27.06 kg/m    GENERAL: healthy, alert and no distress  HENT: oropharynx clear and oral mucous membranes moist, Mallampati II  NECK: Normal ROM  RESP: lungs clear to auscultation - no rales, rhonchi or wheezes  CV: regular rate and rhythm, normal S1 S2,   ABDOMEN: soft, nontender, and bowel sounds normal  MS: no gross musculoskeletal defects noted, no edema      Plan: Appropriate to proceed as scheduled.      Mellisa Palma MD  9/10/2024    PCP:  Norma Hudson    "

## 2024-09-10 NOTE — ANESTHESIA PREPROCEDURE EVALUATION
Anesthesia Pre-Procedure Evaluation    Patient: Olimpia Herrera   MRN: [id number] : 1990        Procedure : Procedure(s):  Esophagoscopy, gastroscopy, duodenoscopy (EGD), combined          Past Medical History:   Diagnosis Date    Anemia     Constipation     COVID     symptoms 10/26/21, pos test 10/27/21    Eczema, dyshidrotic     Hypertension in pregnancy, preeclampsia, severe, delivered 2022    Migraine     Migraines     Varicella       Past Surgical History:   Procedure Laterality Date    APPENDECTOMY        Allergies   Allergen Reactions    Compazine [Prochlorperazine] Swelling     Swelling of tongue    Zantac [Ranitidine] Other (See Comments)     IV Zantac caused : Anxiety      Social History     Tobacco Use    Smoking status: Never    Smokeless tobacco: Never   Substance Use Topics    Alcohol use: No     Alcohol/week: 0.0 standard drinks of alcohol      Wt Readings from Last 1 Encounters:   09/10/24 67.1 kg (148 lb)        Anesthesia Evaluation   Pt has had prior anesthetic. Type: Regional and General.    No history of anesthetic complications       ROS/MED HX  ENT/Pulmonary:  - neg pulmonary ROS   (+)                              recent URI, resolved,         Neurologic:     (+)      migraines,                          Cardiovascular:  - neg cardiovascular ROS     METS/Exercise Tolerance:     Hematologic:  - neg hematologic  ROS     Musculoskeletal:  - neg musculoskeletal ROS     GI/Hepatic:     (+) GERD, Asymptomatic on medication,                  Renal/Genitourinary:  - neg Renal ROS     Endo:     (+)               Obesity,       Psychiatric/Substance Use:  - neg psychiatric ROS     Infectious Disease:  - neg infectious disease ROS     Malignancy:       Other:            Physical Exam    Airway        Mallampati: II       Respiratory Devices and Support         Dental       (+) Minor Abnormalities - some fillings, tiny chips      Cardiovascular          Rhythm and rate: regular     Pulmonary                    OUTSIDE LABS:  CBC:   Lab Results   Component Value Date    WBC 3.7 (L) 08/12/2024    WBC 3.1 (L) 06/19/2024    HGB 11.5 (L) 08/12/2024    HGB 11.8 06/19/2024    HCT 34.5 (L) 08/12/2024    HCT 36.0 06/19/2024     08/12/2024     06/19/2024     BMP:   Lab Results   Component Value Date     06/19/2024     04/26/2024    POTASSIUM 4.1 06/19/2024    POTASSIUM 4.1 04/26/2024    CHLORIDE 106 06/19/2024    CHLORIDE 106 04/26/2024    CO2 26 06/19/2024    CO2 28 04/26/2024    BUN 13.4 06/19/2024    BUN 14.3 04/26/2024    CR 0.97 (H) 06/19/2024    CR 0.97 (H) 04/26/2024    GLC 72 06/19/2024    GLC 83 04/26/2024     COAGS:   Lab Results   Component Value Date    INR 1.10 06/22/2021     POC:   Lab Results   Component Value Date    BGM 92 09/16/2018     HEPATIC:   Lab Results   Component Value Date    ALBUMIN 4.5 06/19/2024    PROTTOTAL 7.7 06/19/2024    ALT 40 06/19/2024    AST 34 06/19/2024    ALKPHOS 49 06/19/2024    BILITOTAL 0.2 06/19/2024     OTHER:   Lab Results   Component Value Date    A1C 5.5 06/30/2021    DUNG 9.1 06/19/2024    MAG 5.3 (H) 01/25/2022    LIPASE 85 06/19/2021    TSH 1.03 01/24/2024    T4 1.30 12/31/2015       Anesthesia Plan    ASA Status:  2    NPO Status:  NPO Appropriate    Anesthesia Type: MAC.     - Reason for MAC: immobility needed              Consents    Anesthesia Plan(s) and associated risks, benefits, and realistic alternatives discussed. Questions answered and patient/representative(s) expressed understanding.     - Discussed:     - Discussed with:  Patient            Postoperative Care            Comments:               Brant Haywood MD    I have reviewed the pertinent notes and labs in the chart from the past 30 days and (re)examined the patient.  Any updates or changes from those notes are reflected in this note.              # Overweight: Estimated body mass index is 27.06 kg/m  as calculated from the following:    Height as of this encounter:  "1.575 m (5' 2.01\").    Weight as of this encounter: 67.1 kg (148 lb).      "

## 2024-09-11 LAB
PATH REPORT.COMMENTS IMP SPEC: NORMAL
PATH REPORT.COMMENTS IMP SPEC: NORMAL
PATH REPORT.FINAL DX SPEC: NORMAL
PATH REPORT.GROSS SPEC: NORMAL
PATH REPORT.MICROSCOPIC SPEC OTHER STN: NORMAL
PATH REPORT.RELEVANT HX SPEC: NORMAL
PHOTO IMAGE: NORMAL

## 2024-09-11 NOTE — ANESTHESIA CARE TRANSFER NOTE
Patient: Rofina O Obioma    Procedure: Procedure(s):  ESOPHAGOGASTRODUODENOSCOPY, WITH BIOPSY       Diagnosis: Epigastric pain [R10.13]  Gastroesophageal reflux disease with esophagitis without hemorrhage [K21.00]  Diagnosis Additional Information: No value filed.    Anesthesia Type:   MAC     Note:  Anesthesia Care Transfer Notewriter  Vitals:  Vitals Value Taken Time   /82 09/10/24 1230   Temp     Pulse 79 09/10/24 1230   Resp 15 09/10/24 1230   SpO2 98 % 09/10/24 1230       Electronically Signed By: CYRUS EDGE CRNA  September 11, 2024  12:49 PM

## 2024-09-14 NOTE — NURSING NOTE
"Chief Complaint   Patient presents with     Headache     headache that started last night - some swelling in lower legs - pt delivered 10/11/18       Initial /76 (BP Location: Right arm, Patient Position: Chair, Cuff Size: Adult Regular)  Pulse 60  Wt 160 lb (72.6 kg)  LMP 01/14/2018 (Exact Date)  Breastfeeding? Yes  BMI 29.26 kg/m2 Estimated body mass index is 29.26 kg/(m^2) as calculated from the following:    Height as of 10/10/18: 5' 2\" (1.575 m).    Weight as of this encounter: 160 lb (72.6 kg).  Medication Reconciliation: complete     Nurse assisted visit.  Margaret Garcia MA.        " Speech Language/Pathology  Speech/Language Pathology  Assessment    Patient Name: Tevin Bazan  Today's Date: 9/14/2024     Problem List  Principal Problem:    CVA (cerebral vascular accident) ([facility])  Active Problems:    Essential hypertension    Symptomatic carotid artery stenosis, left    Hypercholesterolemia    Stage 2 chronic kidney disease    COPD (chronic obstructive pulmonary disease) (HCC)    Coronary artery disease involving native coronary artery of native heart with unstable angina pectoris (HCC)    BPH (benign prostatic hyperplasia)    Type 2 diabetes mellitus without complication ([facility])    Leukocytosis    Garbled speech    Past Medical History  Past Medical History:   Diagnosis Date    Chronic bilateral low back pain without sciatica 7/17/2020    Hypertension     Scoliosis     Stroke ([facility])     Stroke-like symptoms 11/22/2021     Past Surgical History  Past Surgical History:   Procedure Laterality Date    BACK SURGERY      BOWEL RESECTION      CARDIAC CATHETERIZATION N/A 2/26/2024    Procedure: Cardiac Coronary Angiogram;  Surgeon: Rivas Cantor MD;  Location: [facility];  Service: Cardiology    CARDIAC CATHETERIZATION  2/26/2024    Procedure: Cardiac catheterization;  Surgeon: Rivas Cantor MD;  Location: [facility];  Service: Cardiology    CARDIAC CATHETERIZATION N/A 2/26/2024    Procedure: Cardiac pci;  Surgeon: Rivas Cantor MD;  Location: [facility];  Service: Cardiology    [place] TEAEC W/PATCH GRF CAROTID VERTB SUBCLAV NECK INC Left 12/1/2021    Procedure: ENDARTERECTOMY ARTERY CAROTID;  Surgeon: Vito Mo MD;  Location: [facility];  Service: Vascular    TONSILLECTOMY         MOTOR SPEECH EVALUATION    Impressions:    Pt presents w/ mod dysarthria amarilys by distorted speech sounds, imprecise articulation, and increased effort for speech production. In unknown context, speech mod-severe unintelligible. Distortions most frequently noted w/ fricatives and affricates. Pt   "is aware of deficits and attempts to self-correct w/ variable success.       Therapy Prognosis: Fair   Prognosis considerations: Age, motivation for tx     Goals:  LONG TERM GOALS:  -The patient will demonstrate intelligible speech in all activities of daily living including dynamic conversation  -The patient will independently utilize compensatory strategies to maximize communication in all ADLs.    SHORT TERM GOALS:    Respiration and Phonation  -Patient will utilize the strategy of increased respiratory support (ie “inhale more deeply”)  before beginning an utterance at the word, phrase and sentence level tasks  for 90% of trials with minimal to no cues needed in both formal speech tasks and in informal ADL activities    Articulation  -Patient will demonstrate adequate lingual strength, ROM and control for  fricatives and affricates and produce 20 understandable words, phrases, sentences) related to basic personal and medical needs    -Patient will use trained strategies (eg slowed rate, over articulation, increased oral opening, writing key word, increased loudness, phrasing)  to improve speech intelligibility in word,  phrases, sentences and  conversation with 80% accuracy.     -Patient will discriminate between intelligible and unintelligible speech and use trained strategies to repair communication (eg slowed rate, exaggerated articulation, repetition, rephrasal)              Patient's goal: \"This was really tiring\"         H&P/Admit info, pertinent provider notes:(pmh noted above)  Tevin Bazan is a 78 y.o. L hand dominant adult with prior TIA's (2003, 2021 in the setting of symptomatic L ICA), prior left hemispheric CVA (2024, thought to be cardio embolic in the setting of recent cardiac cath), CAD s/p CEA (2021 on DAPT), CKD, HLD, DM, BPH and COPD who presented on 9/13 for strokelike symptoms.       Patient reports that approximately 7 PM on 9/13, he had an acute onset of garbled speech that was " progressively worsening.  He also noticed water was spilling out of his mouth, did not specify what side. Patient denied coughing/choking, focal weakness, visual disturbances, receptive/expressive aphasia. While he was en route to the hospital, his wife stated he was experiencing mild dizziness.     Patient reports compliance with Aspirin, Plavix. He does not take his stain everyday, he only takes it when he gets chest pain. He underwent x 48 hours of Holter monitoring in 2023 which was NSR.     BP on arrival 141/67. NIHSS 1 for speech.  Initial neuroimaging (CT head, CTA head/neck) was unremarkable for acute intracranial abnormalities.  Labs on admission revealed elevated white count (10.77).  Per on-call neurology and ED documentation, patient was agreeable for TNK administration.  TNK was administered at 9:51 PM and he was transferred to the ICU on the stroke pathway.    Special Studies:    CT stroke alert brain 9/13: No acute intracranial hemorrhage, mass effect or midline shift.      CTA stroke alert 9/13:   1. No proximal large vessel occlusion or high-grade vascular stenosis in the head and neck.     2. Redemonstrated left thyroid nodule, for which dedicated thyroid sonogram is recommended if not previously performed.         Did the pt report pain?  No   If yes, was nursing notified/was it addressed? N/A     Precautions: Fall     Evaluation:    Vowel prolongation: /a/ (Normal 15-20 seconds): 7 seconds     Diadochokinesis:    puh puh puh (normal 3-5.5 repetitions/second): 2 reps/second     tuh tuh tuh (normal should exceed 25 reps in 10 seconds): 16 reps     kuh kuh kuh (normal should exceed 25 reps in 10 seconds): 14 reps      puh tuh kuh (or buttercup)- (normal should exceed 8 reps in 10 seconds): 3 reps     Articulation:  Single words: mod imprecise across all sounds      Multisyllabic words: mod-severe imprecise     Repetition of multisyllabic words: mod imprecise, consistent across reps     Words of  progressive length: mod imprecise, consistent across progressive length      Count 1-20, then backwards from 20-1: mod imprecise     Oral Motor Skills:   Facial symmetry: slight L facial droop   Labial: WFL   Lingual: WFL   Palatal function: symmetrical   Voice: flat tone      Oral apraxia:  No s/s     Articulation in connected speech: mod-severe imprecise     Conversation:  Articulatory groping: -   Imprecise articulation: +   Decreased breath support for speech:  +   Hyponasality: +   Hypernasality: -   Fast rate: -   Slow rate: +

## 2024-10-14 DIAGNOSIS — G44.89 HEADACHE SYNDROME: ICD-10-CM

## 2024-10-14 DIAGNOSIS — R10.13 EPIGASTRIC PAIN: ICD-10-CM

## 2024-10-14 RX ORDER — PANTOPRAZOLE SODIUM 20 MG/1
40 TABLET, DELAYED RELEASE ORAL 2 TIMES DAILY
Qty: 180 TABLET | Refills: 1 | Status: SHIPPED | OUTPATIENT
Start: 2024-10-14

## 2024-10-15 RX ORDER — TOPIRAMATE 25 MG/1
25 TABLET, FILM COATED ORAL DAILY
Qty: 60 TABLET | Refills: 0 | OUTPATIENT
Start: 2024-10-15

## 2024-10-15 NOTE — TELEPHONE ENCOUNTER
ADONISM to call back for an appointment. Two more attempts will be made.     Renetta Pitts  Lead   [facility] San[facility]

## 2024-10-15 NOTE — TELEPHONE ENCOUNTER
I have not seen her in >1 year nor have I prescribed her this medication.  Looks like #60 sent in July; pt should be out of medication.  Would advise her to schedule follow-up.  Has been seeing TD more recently;please update PCP accordingly.     Norma Hudson CNP

## 2024-10-15 NOTE — TELEPHONE ENCOUNTER
Pt says that she has not been able to come into clinic because she is not working and her bills are piling up.     Pt has scheduled a VV for her Med check w/ pcp to get her medication refilled.     Ana Rosa Sanford   [facility]

## 2024-10-24 RX ORDER — OMEPRAZOLE 40 MG/1
40 CAPSULE, DELAYED RELEASE ORAL DAILY
Qty: 90 CAPSULE | Refills: 0 | OUTPATIENT
Start: 2024-10-24

## 2024-11-04 ENCOUNTER — TELEPHONE (OUTPATIENT)
Dept: INFECTIOUS DISEASES | Facility: CLINIC | Age: 34
End: 2024-11-04
Payer: COMMERCIAL

## 2024-11-04 NOTE — TELEPHONE ENCOUNTER
EP called 11/4 to sched a video follow up with Dr. Allen for 11/5 at [facility] per provider.     ----- Message from Joel Clalejas sent at 11/4/2024  9:00 AM CST -----  Regarding: video f/u  Andrae Roy    Please add on to my schedule tomorrow at 5.30 pm, if OK with pt   Please let her know before I start her on medication     Kang Allen

## 2024-11-05 ENCOUNTER — VIRTUAL VISIT (OUTPATIENT)
Dept: INFECTIOUS DISEASES | Facility: CLINIC | Age: 34
End: 2024-11-05
Payer: COMMERCIAL

## 2024-11-05 DIAGNOSIS — Z22.7 LATENT TUBERCULOSIS INFECTION: ICD-10-CM

## 2024-11-05 PROCEDURE — 99214 OFFICE O/P EST MOD 30 MIN: CPT | Mod: 95 | Performed by: INTERNAL MEDICINE

## 2024-11-05 RX ORDER — RIFAMPIN 300 MG/1
600 CAPSULE ORAL DAILY
Qty: 60 CAPSULE | Refills: 0 | Status: SHIPPED | OUTPATIENT
Start: 2024-11-05

## 2024-11-05 NOTE — PROGRESS NOTES
Olimpia Herrera is a 34 year old who is being evaluated via a billable video visit.    How would you like to obtain your AVS? MyChart  If the video visit is dropped, the invitation should be resent by: Text to cell phone: [phone]  Will anyone else be joining your video visit? No  Are you still currently in the state of [place]? Yes  If patient encounters technical issues they should call [phone]  During this virtual visit the patient is located in [place], patient verifies this as the location during the entirety of this visit.   Video-Visit Details  Video Start Time: 3.00 PM  Video End Time: 3.17 PM   Originating Location (pt. Location): Home  Distant Location (provider location):  Off site  Platform used for Video Visit: Ant Herrera is a 33 year old who is being evaluated via a billable video visit.      INFECTIOUS DISEASES PROGRESS NOTE    Consult requested by: Estevan Shay MD   Reason for Consult : Latent TB   Date of Consult: 4/26/24     Infectious Diseases Clinic     HISTORY OF PRESENT ILLNESS:                                                    Olimpia Herrera is a 34 year old female with PMHx hypertension, migraines, GERD, presents with positive quantiferon test.     she was born in [place] and worked as a  in [employer], before [place] in [place]. She had BCG as a child and denies any known TB exposure.   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   As part of TB screening test, her quantiferon test on 1/24/24 was positive     She denies chronic fevers, chills, sweats, weight loss, chronic pain, cough, shortness of breath. she has a history of migraines and takes Ibuprofen 400 mg po daily as needed to control pain. she usually takes Ibuprofen and all her medications in the morning on empty stomach. she does not eat until after 12 noon each day. she prays every moning until 12 noon each day.  she takes all her medications  including Ibuprofen n the morning, she now complains of GERD with sometimes chest pain and stomach pain. denies diarrhea/ melena / bloody stools.  She is taking Omeprazole 40 mg po daily but still has GERD symptoms     Discussed labs  Her CXR on 12/1/23 was negative    Imagings:  esophagram 8/25/2020  IMPRESSION:   1. Mild gastroesophageal reflux was observed during the exam.  2. Otherwise unremarkable double contrast esophagram.    Video follow-up on 5/21/24  Feeling better since she stopped ibuprofen and started Omeprazole. denies nausea, vomiting, abdominal pain, diarrhea. migraines are getting better  wants to start Rifampin. discussed  indicatuon and potential side effects of rifampin.    Video follow-up on 6/18/24   Olimpia did not go to the lab at 2 weeks after starting Rifampin. She has nausea and vomiting with epigastric pain with Rifampin. Her appetite is not good recently. denies diarrhea. no black stools. She is taking Omperazole daily     Video follow-up on 11/5/24  Olimpia is doing well. denies any stomach pain/ GERD now no diarrhea. tolertaes oral intake. no nausea/ vomiting. had EGD on 9/30/24 and it was normal. no signs of H. pylori or infection/. she is taking Pantoprazole BID and feels better. no longer taking NSAIDs.   She would like to restart Rifampin     Problem list and histories reviewed & adjusted, as indicated.  Additional history: as documented    PAST MEDICAL HISTORY:  Patient  has a past medical history of Anemia, Constipation, COVID, Eczema, dyshidrotic, Migraine, Migraines, and Varicella.    PAST SURGICAL HISTORY:  Patient  has a past surgical history that includes appendectomy and Esophagoscopy, gastroscopy, duodenoscopy (EGD), combined (N/A, 9/10/2024).    CURRENT MEDICATIONS:  Current Outpatient Medications   Medication Sig Dispense Refill    metoprolol succinate ER (TOPROL XL) 25 MG 24 hr tablet Take 1 tablet (25 mg) by mouth daily 90 tablet 3    pantoprazole (PROTONIX) 20 MG EC  tablet Take 2 tablets (40 mg) by mouth 2 times daily. 180 tablet 1    rifampin (RIFADIN) 300 MG capsule Take 2 capsules (600 mg) by mouth daily. (will be prescribed monthly for 4 months, depending on labs) 60 capsule 0     ALLERGY:  Allergies   Allergen Reactions    Compazine [Prochlorperazine] Swelling     Swelling of tongue    Zantac [Ranitidine] Other (See Comments)     IV Zantac caused : Anxiety     IMMUNIZATION HISTORY:  Immunization History   Administered Date(s) Administered    COVID-19 Monovalent 12+ (Pfizer 2022) 07/05/2022, 08/01/2022    Influenza Vaccine >6 months,quad, PF 09/15/2015, 10/12/2018, 10/23/2019, 10/01/2021, 12/07/2022, 01/24/2024    TDAP (Adacel,Boostrix) 12/01/2013, 04/29/2016, 10/12/2018, 10/25/2021    TDAP Vaccine (Adacel) 10/12/2018    TDAP Vaccine (Boostrix) 04/29/2016, 10/12/2018, 10/25/2021     SOCIAL HISTORY:  Patient  reports that she has never smoked. She has never used smokeless tobacco. She reports that she does not drink alcohol and does not use drugs.   with 3 childern, 8 yo son, 6 yo son, 1 yo daughter  no alcohol, no tobacco, no illicit drugs     FAMILY HISTORY:  Patient's family history includes Hypertension in her maternal grandmother and mother; No Known Problems in her sister and sister.  4 siblings.     Labs reviewed in EPIC    OBJECTIVE:                                                    GENERAL: healthy, alert and no distress  EYES: Eyes grossly normal to inspection, and conjunctivae and sclerae normal  RESP: breathing comfortably on room air  SKIN: no suspicious lesions or rashes  NEURO: mentation intact and speech normal  PSYCH: mentation appears normal, affect normal       ASSESSMENT AND PLAN:                                                      Latent TB   - started Rifampin after 5/21/24 but complained of nausea, vomiting and epigastric pain . stopped after 2 weeks    GERD and Epigastric pain, possibly gastritis   - better with omeperazole / PPI   Migraines    Hypertension - under control     Recommendations  - restart Rifampin 600 mg po daily . discussed potential side effects of Rifampin   - avoid NSAID. History of NSAID use for migraines   - check CMP, CBC after 3-4 weeks of Rifampin     Video follow-up with me on 12/3/24     Joel Callejas MD, M.Med.Sc  Division of Infectious Diseases and International Medicine  [facility]      35 Minutes spent by me on the date of service doing chart review, history, exam, documentation, coordination of care and further activities per the note

## 2024-12-03 ENCOUNTER — VIRTUAL VISIT (OUTPATIENT)
Dept: INFECTIOUS DISEASES | Facility: CLINIC | Age: 34
End: 2024-12-03
Payer: COMMERCIAL

## 2024-12-03 DIAGNOSIS — Z22.7 LATENT TUBERCULOSIS INFECTION: ICD-10-CM

## 2024-12-03 PROCEDURE — 99213 OFFICE O/P EST LOW 20 MIN: CPT | Mod: 95 | Performed by: INTERNAL MEDICINE

## 2024-12-03 RX ORDER — RIFAMPIN 300 MG/1
600 CAPSULE ORAL DAILY
Qty: 60 CAPSULE | Refills: 0 | Status: SHIPPED | OUTPATIENT
Start: 2024-12-03 | End: 2024-12-03

## 2024-12-03 RX ORDER — RIFAMPIN 300 MG/1
600 CAPSULE ORAL DAILY
Qty: 70 CAPSULE | Refills: 0 | Status: SHIPPED | OUTPATIENT
Start: 2024-12-03 | End: 2025-01-07

## 2024-12-03 NOTE — PROGRESS NOTES
Olimpia Herrera is a 34 year old who is being evaluated via a billable video visit.      How would you like to obtain your AVS? MyChart  If the video visit is dropped, the invitation should be resent by: Text to cell phone: [phone]  Will anyone else be joining your video visit? No  Are you still currently in the state of [place]? Yes  If patient encounters technical issues they should call [phone]  During this virtual visit the patient is located in [place], patient verifies this as the location during the entirety of this visit.   Video-Visit Details  Video Start Time: 1.30 PM  Video End Time:  1.38  PM   Originating Location (pt. Location): Home  Distant Location (provider location):  Off site  Platform used for Video Visit: Ant Herrera is a 33 year old who is being evaluated via a billable video visit.      INFECTIOUS DISEASES PROGRESS NOTE    Consult requested by: Estevan Shay MD   Reason for Consult : Latent TB   Date of Consult: 4/26/24     [facility]     HISTORY OF PRESENT ILLNESS:                                                    Olimpia Herrera is a 34 year old female with PMHx hypertension, migraines, GERD, presents with positive quantiferon test.     she was born in [place] and worked as a  in [employer], before [place] to [place] in [place]. She had BCG as a child and denies any known TB exposure.   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   As part of TB screening test, her quantiferon test on 1/24/24 was positive     She denies chronic fevers, chills, sweats, weight loss, chronic pain, cough, shortness of breath. she has a history of migraines and takes Ibuprofen 400 mg po daily as needed to control pain. she usually takes Ibuprofen and all her medications in the morning on empty stomach. she does not eat until after 12 noon each day. she prays every moning until 12 noon each day.  she takes all her medications  including Ibuprofen n the morning, she now complains of GERD with sometimes chest pain and stomach pain. denies diarrhea/ melena / bloody stools.  She is taking Omeprazole 40 mg po daily but still has GERD symptoms     Discussed labs  Her CXR on 12/1/23 was negative    Imagings:  esophagram 8/25/2020  IMPRESSION:   1. Mild gastroesophageal reflux was observed during the exam.  2. Otherwise unremarkable double contrast esophagram.    Video follow-up on 5/21/24  Feeling better since she stopped ibuprofen and started Omeprazole. denies nausea, vomiting, abdominal pain, diarrhea. migraines are getting better  wants to start Rifampin. discussed  indicatuon and potential side effects of rifampin.    Video follow-up on 6/18/24   Olimpia did not go to the lab at 2 weeks after starting Rifampin. She has nausea and vomiting with epigastric pain with Rifampin. Her appetite is not good recently. denies diarrhea. no black stools. She is taking Omperazole daily     Video follow-up on 11/5/24  Olimpia is doing well. denies any stomach pain/ GERD now no diarrhea. tolerates oral intake. no nausea/ vomiting. had EGD on 9/30/24 and it was normal. no signs of H. pylori or infection/. she is taking Pantoprazole BID and feels better. no longer taking NSAIDs.   She would like to restart Rifampin     Video follow-up on 12/3/24    Problem list and histories reviewed & adjusted, as indicated.  Additional history: as documented    PAST MEDICAL HISTORY:  Patient  has a past medical history of Anemia, Constipation, COVID, Eczema, dyshidrotic, Migraine, Migraines, and Varicella.    PAST SURGICAL HISTORY:  Patient  has a past surgical history that includes appendectomy and Esophagoscopy, gastroscopy, duodenoscopy (EGD), combined (N/A, 9/10/2024).    CURRENT MEDICATIONS:  Current Outpatient Medications   Medication Sig Dispense Refill    metoprolol succinate ER (TOPROL XL) 25 MG 24 hr tablet Take 1 tablet (25 mg) by mouth daily 90 tablet 3     pantoprazole (PROTONIX) 20 MG EC tablet Take 2 tablets (40 mg) by mouth 2 times daily. 180 tablet 1    rifampin (RIFADIN) 300 MG capsule Take 2 capsules (600 mg) by mouth daily. (will be prescribed monthly for 4 months, depending on labs) 60 capsule 0     ALLERGY:  Allergies   Allergen Reactions    Compazine [Prochlorperazine] Swelling     Swelling of tongue    Zantac [Ranitidine] Other (See Comments)     IV Zantac caused : Anxiety     IMMUNIZATION HISTORY:  Immunization History   Administered Date(s) Administered    COVID-19 Monovalent 12+ (Pfizer 2022) 07/05/2022, 08/01/2022    Influenza Vaccine >6 months,quad, PF 09/15/2015, 10/12/2018, 10/23/2019, 10/01/2021, 12/07/2022, 01/24/2024    TDAP (Adacel,Boostrix) 12/01/2013, 04/29/2016, 10/12/2018, 10/25/2021    TDAP Vaccine (Adacel) 10/12/2018    TDAP Vaccine (Boostrix) 04/29/2016, 10/12/2018, 10/25/2021     SOCIAL HISTORY:  Patient  reports that she has never smoked. She has never used smokeless tobacco. She reports that she does not drink alcohol and does not use drugs.   with 3 childern, 6 yo son, 6 yo son, 3 yo daughter  no alcohol, no tobacco, no illicit drugs     FAMILY HISTORY:  Patient's family history includes Hypertension in her maternal grandmother and mother; No Known Problems in her sister and sister.  4 siblings.     Labs reviewed in EPIC    OBJECTIVE:                                                    Video follow-up    GENERAL: healthy, alert and no distress  EYES: Eyes grossly normal to inspection, and conjunctivae and sclerae normal  RESP: breathing comfortably on room air, not coughing   SKIN: no suspicious lesions or rashes  NEURO: mentation intact and speech normal  PSYCH: mentation appears normal, affect normal       ASSESSMENT AND PLAN:                                                      Latent TB   - started Rifampin after 5/21/24 but complained of nausea, vomiting and epigastric pain . stopped after 2 weeks  - restarted Rifampin 600  mg po daily on 11/5/24. tolerates it well.     GERD and Epigastric pain, possibly gastritis   - asymptomatic ( on PPI)    Migraines - occasional.   Hypertension - under control     Recommendations  - continue Rifampin 600 mg po daily . discussed potential side effects of Rifampin   - avoid NSAID. History of NSAID use for migraines   - check CMP, CBC. pt will do these tests tomorrow    Video follow-up with me on 1/7/24 at 2 PM     Joel Callejas MD, M.Med.Sc  Division of Infectious Diseases and International Medicine  [facility]      23 Minutes spent by me on the date of service doing chart review, history, exam, documentation, coordination of care and further activities per the note

## 2025-01-07 ENCOUNTER — VIRTUAL VISIT (OUTPATIENT)
Dept: INFECTIOUS DISEASES | Facility: CLINIC | Age: 35
End: 2025-01-07
Payer: COMMERCIAL

## 2025-01-07 DIAGNOSIS — Z22.7 LATENT TUBERCULOSIS INFECTION: ICD-10-CM

## 2025-01-07 DIAGNOSIS — Z51.81 MEDICATION MONITORING ENCOUNTER: Primary | ICD-10-CM

## 2025-01-07 PROCEDURE — 98005 SYNCH AUDIO-VIDEO EST LOW 20: CPT | Performed by: INTERNAL MEDICINE

## 2025-01-07 RX ORDER — RIFAMPIN 300 MG/1
600 CAPSULE ORAL DAILY
Qty: 60 CAPSULE | Refills: 0 | Status: SHIPPED | OUTPATIENT
Start: 2025-01-07

## 2025-01-07 NOTE — PROGRESS NOTES
Olimpia Herrera is a 34 year old who is being evaluated via a billable video visit.      How would you like to obtain your AVS? MyChart  If the video visit is dropped, the invitation should be resent by: Text to cell phone: [phone]  Will anyone else be joining your video visit? No  Are you still currently in the state of [place]? Yes  If patient encounters technical issues they should call [phone]  During this virtual visit the patient is located in [place], patient verifies this as the location during the entirety of this visit.   Video-Visit Details  Video Start Time: 2.04 PM  Video End Time:  2. 0.9  PM   Originating Location (pt. Location): Home  Distant Location (provider location):  Off site  Platform used for Video Visit: [employer]    INFECTIOUS DISEASES PROGRESS NOTE    Consult requested by: Estevan Shay MD   Reason for Consult : Latent TB   Date of Consult: 4/26/24     Infectious Diseases Clinic     HISTORY OF PRESENT ILLNESS:                                                    Olimpia Herrera is a 34 year old female with PMHx hypertension, migraines, GERD, presents with positive quantiferon test.     she was born in [place] and worked as a  in [employer], before [place]. She had BCG as a child and denies any known TB exposure.   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   As part of TB screening test, her quantiferon test on 1/24/24 was positive     She denies chronic fevers, chills, sweats, weight loss, chronic pain, cough, shortness of breath. she has a history of migraines and takes Ibuprofen 400 mg po daily as needed to control pain. she usually takes Ibuprofen and all her medications in the morning on empty stomach. she does not eat until after 12 noon each day. she prays every moning until 12 noon each day.  she takes all her medications including Ibuprofen n the morning, she now complains of GERD with sometimes chest pain  and stomach pain. denies diarrhea/ melena / bloody stools.  She is taking Omeprazole 40 mg po daily but still has GERD symptoms     Discussed labs  Her CXR on 12/1/23 was negative    Imagings:  esophagram 8/25/2020  IMPRESSION:   1. Mild gastroesophageal reflux was observed during the exam.  2. Otherwise unremarkable double contrast esophagram.    Video follow-up on 5/21/24  Feeling better since she stopped ibuprofen and started Omeprazole. denies nausea, vomiting, abdominal pain, diarrhea. migraines are getting better  wants to start Rifampin. discussed  indicatuon and potential side effects of rifampin.    Video follow-up on 6/18/24   Olimpia did not go to the lab at 2 weeks after starting Rifampin. She has nausea and vomiting with epigastric pain with Rifampin. Her appetite is not good recently. denies diarrhea. no black stools. She is taking Omperazole daily     Video follow-up on 11/5/24  Olimpia is doing well. denies any stomach pain/ GERD now no diarrhea. tolerates oral intake. no nausea/ vomiting. had EGD on 9/30/24 and it was normal. no signs of H. pylori or infection/. she is taking Pantoprazole BID and feels better. no longer taking NSAIDs.   She would like to restart Rifampin     Video follow-up on 12/3/24  doing well. tolerate meidcation     Video follow-up pn 1/7/25   Olimpia is feeling well. no nausea, vomiting, diarrhea, GERD, abdominal pain. appetite is good. tolerates Rifampin well. compliant with medication   discussed labs     Problem list and histories reviewed & adjusted, as indicated.  Additional history: as documented    PAST MEDICAL HISTORY:  Patient  has a past medical history of Anemia, Constipation, COVID, Eczema, dyshidrotic, Migraine, Migraines, and Varicella.    PAST SURGICAL HISTORY:  Patient  has a past surgical history that includes appendectomy and Esophagoscopy, gastroscopy, duodenoscopy (EGD), combined (N/A, 9/10/2024).    CURRENT MEDICATIONS:  Current Outpatient Medications    Medication Sig Dispense Refill    metoprolol succinate ER (TOPROL XL) 25 MG 24 hr tablet Take 1 tablet (25 mg) by mouth daily 90 tablet 3    pantoprazole (PROTONIX) 20 MG EC tablet Take 2 tablets (40 mg) by mouth 2 times daily. 180 tablet 1    rifampin (RIFADIN) 300 MG capsule Take 2 capsules (600 mg) by mouth daily. (will be prescribed monthly for 4 months, depending on labs) 60 capsule 0     ALLERGY:  Allergies   Allergen Reactions    Compazine [Prochlorperazine] Swelling     Swelling of tongue    Zantac [Ranitidine] Other (See Comments)     IV Zantac caused : Anxiety     IMMUNIZATION HISTORY:  Immunization History   Administered Date(s) Administered    COVID-19 Monovalent 12+ (Pfizer 2022) 07/05/2022, 08/01/2022    Influenza Vaccine >6 months,quad, PF 09/15/2015, 10/12/2018, 10/23/2019, 10/01/2021, 12/07/2022, 01/24/2024    TDAP (Adacel,Boostrix) 12/01/2013, 04/29/2016, 10/12/2018, 10/25/2021    TDAP Vaccine (Adacel) 10/12/2018    TDAP Vaccine (Boostrix) 04/29/2016, 10/12/2018, 10/25/2021     SOCIAL HISTORY:  Patient  reports that she has never smoked. She has never used smokeless tobacco. She reports that she does not drink alcohol and does not use drugs.   with 3 childern, 6 yo son, 4 yo son, 3 yo daughter  no alcohol, no tobacco, no illicit drugs     FAMILY HISTORY:  Patient's family history includes Hypertension in her maternal grandmother and mother; No Known Problems in her sister and sister.  4 siblings.     Labs reviewed in EPIC    OBJECTIVE:                                                    Video follow-up    GENERAL: healthy, alert and no distress  EYES: Eyes grossly normal to inspection, and conjunctivae and sclerae normal  RESP: breathing comfortably on room air, not coughing   SKIN: no suspicious lesions or rashes  NEURO: mentation intact and speech normal  PSYCH: mentation appears normal, affect normal       ASSESSMENT AND PLAN:                                                      Latent  TB   - started Rifampin after 5/21/24 but complained of nausea, vomiting and epigastric pain . stopped after 2 weeks  - restarted Rifampin 600 mg po daily on 11/5/24. tolerates it well.     GERD and Epigastric pain, possibly gastritis   - asymptomatic ( on PPI)    Migraines - occasional.   Hypertension - under control     Recommendations  - continue Rifampin 600 mg po daily . discussed potential side effects of Rifampin . reordered 1 month supply   - avoid NSAID. History of NSAID use for migraines   - check CMP, CBC diff     Video follow-up with me on 2/4/25 at 2.30PM     Joel Callejas MD, M.Med.Sc  Division of Infectious Diseases and International Medicine  [facility]      25  Minutes spent by me on the date of service doing chart review, history, exam, documentation, coordination of care and further activities per the note

## 2025-02-04 ENCOUNTER — VIRTUAL VISIT (OUTPATIENT)
Dept: INFECTIOUS DISEASES | Facility: CLINIC | Age: 35
End: 2025-02-04
Payer: COMMERCIAL

## 2025-02-04 DIAGNOSIS — Z51.81 MEDICATION MONITORING ENCOUNTER: ICD-10-CM

## 2025-02-04 DIAGNOSIS — Z22.7 LATENT TUBERCULOSIS INFECTION: Primary | ICD-10-CM

## 2025-02-04 PROCEDURE — 98006 SYNCH AUDIO-VIDEO EST MOD 30: CPT | Performed by: INTERNAL MEDICINE

## 2025-02-04 NOTE — PROGRESS NOTES
Olimpia Herrera is a 34 year old who is being evaluated via a billable video visit.      How would you like to obtain your AVS? MyChart  If the video visit is dropped, the invitation should be resent by: Text to cell phone: [phone]  Will anyone else be joining your video visit? No  Are you still currently in the state of [place]? Yes  If patient encounters technical issues they should call [phone]  During this virtual visit the patient is located in [place], patient verifies this as the location during the entirety of this visit.   Video-Visit Details  Video Start Time: 2.30 PM  Video End Time:  2. 40  PM   Originating Location (pt. Location): Home  Distant Location (provider location):  Off site  Platform used for Video Visit: [employer]    INFECTIOUS DISEASES PROGRESS NOTE    Infectious Diseases Clinic     HISTORY OF PRESENT ILLNESS:                                                    Olimpia Herrera is a 34 year old female with PMHx hypertension, migraines, GERD, presents with positive quantiferon test.     she was born in [facility] and worked as a  in [employer], [place]. She had BCG as a child and denies any known TB exposure.   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   As part of TB screening test, her quantiferon test on 1/24/24 was positive     She denies chronic fevers, chills, sweats, weight loss, chronic pain, cough, shortness of breath. she has a history of migraines and takes Ibuprofen 400 mg po daily as needed to control pain. she usually takes Ibuprofen and all her medications in the morning on empty stomach. she does not eat until after 12 noon each day. she prays every moning until 12 noon each day.  she takes all her medications including Ibuprofen n the morning, she now complains of GERD with sometimes chest pain and stomach pain. denies diarrhea/ melena / bloody stools.  She is taking Omeprazole 40 mg po daily  but still has GERD symptoms     Discussed labs  Her CXR on 12/1/23 was negative    Imagings:  esophagram 8/25/2020  IMPRESSION:   1. Mild gastroesophageal reflux was observed during the exam.  2. Otherwise unremarkable double contrast esophagram.    Video follow-up on 5/21/24  Feeling better since she stopped ibuprofen and started Omeprazole. denies nausea, vomiting, abdominal pain, diarrhea. migraines are getting better  wants to start Rifampin. discussed  indicatuon and potential side effects of rifampin.    Video follow-up on 6/18/24   Olimpia did not go to the lab at 2 weeks after starting Rifampin. She has nausea and vomiting with epigastric pain with Rifampin. Her appetite is not good recently. denies diarrhea. no black stools. She is taking Omperazole daily     Video follow-up on 11/5/24  Olimpia is doing well. denies any stomach pain/ GERD now no diarrhea. tolerates oral intake. no nausea/ vomiting. had EGD on 9/30/24 and it was normal. no signs of H. pylori or infection/. she is taking Pantoprazole BID and feels better. no longer taking NSAIDs.   She would like to restart Rifampin     Video follow-up on 12/3/24  doing well. tolerate meidcation     Video follow-up pn 1/7/25   Olimpia is feeling well. no nausea, vomiting, diarrhea, GERD, abdominal pain. appetite is good. tolerates Rifampin well. compliant with medication   discussed labs     Video follow-up on 2/4/25   Olimpia is doing well. occasional migraines.   Tolerates Rifampin well. no nausea/diarrhea. tolerates oral intake   Discussed labs       Problem list and histories reviewed & adjusted, as indicated.  Additional history: as documented    PAST MEDICAL HISTORY:  Patient  has a past medical history of Anemia, Constipation, COVID, Eczema, dyshidrotic, Migraine, Migraines, and Varicella.    PAST SURGICAL HISTORY:  Patient  has a past surgical history that includes appendectomy and Esophagoscopy, gastroscopy, duodenoscopy (EGD), combined (N/A,  9/10/2024).    CURRENT MEDICATIONS:  Current Outpatient Medications   Medication Sig Dispense Refill    metoprolol succinate ER (TOPROL XL) 25 MG 24 hr tablet Take 1 tablet (25 mg) by mouth daily 90 tablet 3    pantoprazole (PROTONIX) 20 MG EC tablet Take 2 tablets (40 mg) by mouth 2 times daily. 180 tablet 1    rifampin (RIFADIN) 300 MG capsule Take 2 capsules (600 mg) by mouth daily. (will be prescribed monthly for 4 months, depending on labs) 60 capsule 0     ALLERGY:  Allergies   Allergen Reactions    Compazine [Prochlorperazine] Swelling     Swelling of tongue    Zantac [Ranitidine] Other (See Comments)     IV Zantac caused : Anxiety     IMMUNIZATION HISTORY:  Immunization History   Administered Date(s) Administered    COVID-19 Monovalent 12+ (Pfizer 2022) 07/05/2022, 08/01/2022    Influenza Vaccine >6 months,quad, PF 09/15/2015, 10/12/2018, 10/23/2019, 10/01/2021, 12/07/2022, 01/24/2024    TDAP (Adacel,Boostrix) 12/01/2013, 04/29/2016, 10/12/2018, 10/25/2021    TDAP Vaccine (Adacel) 10/12/2018    TDAP Vaccine (Boostrix) 04/29/2016, 10/12/2018, 10/25/2021     SOCIAL HISTORY:  Patient  reports that she has never smoked. She has never used smokeless tobacco. She reports that she does not drink alcohol and does not use drugs.   with 3 childern, 6 yo son, 6 yo son, 1 yo daughter  no alcohol, no tobacco, no illicit drugs     FAMILY HISTORY:  Patient's family history includes Hypertension in her maternal grandmother and mother; No Known Problems in her sister and sister.  4 siblings.     Labs reviewed in EPIC    OBJECTIVE:                                                    Video follow-up    GENERAL: healthy, alert and no distress  EYES: Eyes grossly normal to inspection, and conjunctivae and sclerae normal  RESP: breathing comfortably on room air, not coughing   SKIN: no suspicious lesions or rashes  NEURO: mentation intact and speech normal  PSYCH: mentation appears normal, affect normal       ASSESSMENT AND  PLAN:                                                      Latent TB   - started Rifampin after 5/21/24 but complained of nausea, vomiting and epigastric pain . stopped after 2 weeks  - restarted Rifampin 600 mg po daily on 11/5/24. tolerates it well.     GERD and Epigastric pain, possibly gastritis   - asymptomatic ( on PPI)    Migraines - occasional.   Hypertension - under control     Recommendations  - continue Rifampin 600 mg po daily . discussed potential side effects of Rifampin . 1 more month to go.   - avoid NSAID. History of NSAID use for migraines   - CBC diff in 1-2 weeks     will complete 4 months Rifampin for latent TB on 3/5     Joel Callejas MD, M.Med.Sc  Division of Infectious Diseases and International Medicine  [facility]      25  Minutes spent by me on the date of service doing chart review, history, exam, documentation, coordination of care and further activities per the note

## 2025-02-07 PROBLEM — K21.9 GASTROESOPHAGEAL REFLUX DISEASE WITHOUT ESOPHAGITIS: Status: ACTIVE | Noted: 2017-03-27

## 2025-02-07 PROBLEM — Z22.7 LATENT TUBERCULOSIS: Status: ACTIVE | Noted: 2023-12-01

## 2025-02-24 ENCOUNTER — LAB (OUTPATIENT)
Dept: LAB | Facility: CLINIC | Age: 35
End: 2025-02-24
Payer: COMMERCIAL

## 2025-02-24 DIAGNOSIS — Z22.7 LATENT TUBERCULOSIS INFECTION: ICD-10-CM

## 2025-02-24 DIAGNOSIS — Z51.81 MEDICATION MONITORING ENCOUNTER: ICD-10-CM

## 2025-02-24 LAB
BASOPHILS # BLD AUTO: 0 10E3/UL (ref 0–0.2)
BASOPHILS NFR BLD AUTO: 0 %
EOSINOPHIL # BLD AUTO: 0 10E3/UL (ref 0–0.7)
EOSINOPHIL NFR BLD AUTO: 1 %
ERYTHROCYTE [DISTWIDTH] IN BLOOD BY AUTOMATED COUNT: 13.1 % (ref 10–15)
HCT VFR BLD AUTO: 34.7 % (ref 35–47)
HGB BLD-MCNC: 11.7 G/DL (ref 11.7–15.7)
IMM GRANULOCYTES # BLD: 0 10E3/UL
IMM GRANULOCYTES NFR BLD: 0 %
LYMPHOCYTES # BLD AUTO: 1.5 10E3/UL (ref 0.8–5.3)
LYMPHOCYTES NFR BLD AUTO: 38 %
MCH RBC QN AUTO: 30.5 PG (ref 26.5–33)
MCHC RBC AUTO-ENTMCNC: 33.7 G/DL (ref 31.5–36.5)
MCV RBC AUTO: 91 FL (ref 78–100)
MONOCYTES # BLD AUTO: 0.3 10E3/UL (ref 0–1.3)
MONOCYTES NFR BLD AUTO: 7 %
NEUTROPHILS # BLD AUTO: 2.1 10E3/UL (ref 1.6–8.3)
NEUTROPHILS NFR BLD AUTO: 53 %
PLATELET # BLD AUTO: 208 10E3/UL (ref 150–450)
RBC # BLD AUTO: 3.83 10E6/UL (ref 3.8–5.2)
WBC # BLD AUTO: 3.9 10E3/UL (ref 4–11)

## 2025-02-24 PROCEDURE — 85025 COMPLETE CBC W/AUTO DIFF WBC: CPT

## 2025-02-24 PROCEDURE — 36415 COLL VENOUS BLD VENIPUNCTURE: CPT

## 2025-08-10 SDOH — HEALTH STABILITY: PHYSICAL HEALTH: ON AVERAGE, HOW MANY DAYS PER WEEK DO YOU ENGAGE IN MODERATE TO STRENUOUS EXERCISE (LIKE A BRISK WALK)?: 3 DAYS

## 2025-08-10 SDOH — HEALTH STABILITY: PHYSICAL HEALTH: ON AVERAGE, HOW MANY MINUTES DO YOU ENGAGE IN EXERCISE AT THIS LEVEL?: 20 MIN

## 2025-08-10 ASSESSMENT — SOCIAL DETERMINANTS OF HEALTH (SDOH): HOW OFTEN DO YOU GET TOGETHER WITH FRIENDS OR RELATIVES?: ONCE A WEEK

## 2025-08-12 DIAGNOSIS — I10 ESSENTIAL HYPERTENSION: ICD-10-CM

## 2025-08-12 DIAGNOSIS — R42 DIZZINESS: ICD-10-CM

## 2025-08-12 RX ORDER — METOPROLOL SUCCINATE 25 MG/1
25 TABLET, EXTENDED RELEASE ORAL DAILY
Qty: 30 TABLET | Refills: 0 | Status: SHIPPED | OUTPATIENT
Start: 2025-08-12

## 2025-08-15 ENCOUNTER — OFFICE VISIT (OUTPATIENT)
Dept: FAMILY MEDICINE | Facility: CLINIC | Age: 35
End: 2025-08-15
Payer: COMMERCIAL

## 2025-08-15 VITALS
WEIGHT: 145 LBS | HEIGHT: 62 IN | OXYGEN SATURATION: 98 % | DIASTOLIC BLOOD PRESSURE: 77 MMHG | HEART RATE: 57 BPM | BODY MASS INDEX: 26.68 KG/M2 | TEMPERATURE: 98 F | SYSTOLIC BLOOD PRESSURE: 126 MMHG | RESPIRATION RATE: 16 BRPM

## 2025-08-15 DIAGNOSIS — K21.9 GASTROESOPHAGEAL REFLUX DISEASE WITHOUT ESOPHAGITIS: ICD-10-CM

## 2025-08-15 DIAGNOSIS — D72.819 LEUKOPENIA, UNSPECIFIED TYPE: ICD-10-CM

## 2025-08-15 DIAGNOSIS — N64.4 BREAST PAIN: ICD-10-CM

## 2025-08-15 DIAGNOSIS — I10 ESSENTIAL HYPERTENSION: ICD-10-CM

## 2025-08-15 DIAGNOSIS — Z00.00 ROUTINE GENERAL MEDICAL EXAMINATION AT A HEALTH CARE FACILITY: Primary | ICD-10-CM

## 2025-08-15 DIAGNOSIS — D64.9 NORMOCYTIC ANEMIA: ICD-10-CM

## 2025-08-15 LAB
ANION GAP SERPL CALCULATED.3IONS-SCNC: 9 MMOL/L (ref 7–15)
BASOPHILS # BLD AUTO: <0.04 10E3/UL (ref 0–0.2)
BASOPHILS NFR BLD AUTO: 0.7 %
BUN SERPL-MCNC: 14.1 MG/DL (ref 6–20)
CALCIUM SERPL-MCNC: 9.6 MG/DL (ref 8.8–10.4)
CHLORIDE SERPL-SCNC: 106 MMOL/L (ref 98–107)
CREAT SERPL-MCNC: 1 MG/DL (ref 0.51–0.95)
EGFRCR SERPLBLD CKD-EPI 2021: 74 ML/MIN/1.73M2
EOSINOPHIL # BLD AUTO: <0.04 10E3/UL (ref 0–0.7)
EOSINOPHIL NFR BLD AUTO: 0.3 %
ERYTHROCYTE [DISTWIDTH] IN BLOOD BY AUTOMATED COUNT: 12.8 % (ref 10–15)
GLUCOSE SERPL-MCNC: 75 MG/DL (ref 70–99)
HCO3 SERPL-SCNC: 25 MMOL/L (ref 22–29)
HCT VFR BLD AUTO: 34.6 % (ref 35–47)
HGB BLD-MCNC: 11.8 G/DL (ref 11.7–15.7)
IMM GRANULOCYTES # BLD: <0.04 10E3/UL
IMM GRANULOCYTES NFR BLD: 0 %
LYMPHOCYTES # BLD AUTO: 1.49 10E3/UL (ref 0.8–5.3)
LYMPHOCYTES NFR BLD AUTO: 49.2 %
MCH RBC QN AUTO: 30.5 PG (ref 26.5–33)
MCHC RBC AUTO-ENTMCNC: 34.1 G/DL (ref 31.5–36.5)
MCV RBC AUTO: 89.4 FL (ref 78–100)
MONOCYTES # BLD AUTO: 0.2 10E3/UL (ref 0–1.3)
MONOCYTES NFR BLD AUTO: 6.6 %
NEUTROPHILS # BLD AUTO: 1.31 10E3/UL (ref 1.6–8.3)
NEUTROPHILS NFR BLD AUTO: 43.2 %
PLATELET # BLD AUTO: 192 10E3/UL (ref 150–450)
POTASSIUM SERPL-SCNC: 4 MMOL/L (ref 3.4–5.3)
RBC # BLD AUTO: 3.87 10E6/UL (ref 3.8–5.2)
RETICS # AUTO: 0.07 10E6/UL (ref 0.03–0.1)
RETICS/RBC NFR AUTO: 1.84 % (ref 0.5–2)
SODIUM SERPL-SCNC: 140 MMOL/L (ref 135–145)
WBC # BLD AUTO: 3.03 10E3/UL (ref 4–11)

## 2025-08-15 PROCEDURE — 85025 COMPLETE CBC W/AUTO DIFF WBC: CPT | Performed by: STUDENT IN AN ORGANIZED HEALTH CARE EDUCATION/TRAINING PROGRAM

## 2025-08-15 PROCEDURE — 3078F DIAST BP <80 MM HG: CPT | Performed by: STUDENT IN AN ORGANIZED HEALTH CARE EDUCATION/TRAINING PROGRAM

## 2025-08-15 PROCEDURE — 99214 OFFICE O/P EST MOD 30 MIN: CPT | Mod: 25 | Performed by: STUDENT IN AN ORGANIZED HEALTH CARE EDUCATION/TRAINING PROGRAM

## 2025-08-15 PROCEDURE — 80048 BASIC METABOLIC PNL TOTAL CA: CPT | Performed by: STUDENT IN AN ORGANIZED HEALTH CARE EDUCATION/TRAINING PROGRAM

## 2025-08-15 PROCEDURE — 3074F SYST BP LT 130 MM HG: CPT | Performed by: STUDENT IN AN ORGANIZED HEALTH CARE EDUCATION/TRAINING PROGRAM

## 2025-08-15 PROCEDURE — 99395 PREV VISIT EST AGE 18-39: CPT | Performed by: STUDENT IN AN ORGANIZED HEALTH CARE EDUCATION/TRAINING PROGRAM

## 2025-08-15 PROCEDURE — 99207 BLOOD MORPHOLOGY PATHOLOGIST REVIEW: CPT | Performed by: PATHOLOGY

## 2025-08-15 PROCEDURE — 85045 AUTOMATED RETICULOCYTE COUNT: CPT | Performed by: STUDENT IN AN ORGANIZED HEALTH CARE EDUCATION/TRAINING PROGRAM

## 2025-08-15 PROCEDURE — 36415 COLL VENOUS BLD VENIPUNCTURE: CPT | Performed by: STUDENT IN AN ORGANIZED HEALTH CARE EDUCATION/TRAINING PROGRAM

## 2025-08-15 PROCEDURE — 1126F AMNT PAIN NOTED NONE PRSNT: CPT | Performed by: STUDENT IN AN ORGANIZED HEALTH CARE EDUCATION/TRAINING PROGRAM

## 2025-08-15 RX ORDER — PANTOPRAZOLE SODIUM 20 MG/1
20 TABLET, DELAYED RELEASE ORAL DAILY
Qty: 90 TABLET | Refills: 3 | Status: SHIPPED | OUTPATIENT
Start: 2025-08-15

## 2025-08-15 RX ORDER — METOPROLOL SUCCINATE 25 MG/1
25 TABLET, EXTENDED RELEASE ORAL DAILY
Qty: 90 TABLET | Refills: 3 | Status: SHIPPED | OUTPATIENT
Start: 2025-08-15

## 2025-08-15 ASSESSMENT — PAIN SCALES - GENERAL: PAINLEVEL_OUTOF10: NO PAIN (0)

## 2025-08-18 PROBLEM — D70.8 OTHER NEUTROPENIA: Status: ACTIVE | Noted: 2025-08-18

## 2025-08-18 LAB
PATH REPORT.COMMENTS IMP SPEC: NORMAL
PATH REPORT.FINAL DX SPEC: NORMAL
PATH REPORT.MICROSCOPIC SPEC OTHER STN: NORMAL
PATH REPORT.MICROSCOPIC SPEC OTHER STN: NORMAL

## 2025-08-26 ENCOUNTER — HOSPITAL ENCOUNTER (OUTPATIENT)
Dept: MAMMOGRAPHY | Facility: CLINIC | Age: 35
Discharge: HOME | End: 2025-08-26
Attending: STUDENT IN AN ORGANIZED HEALTH CARE EDUCATION/TRAINING PROGRAM
Payer: COMMERCIAL

## 2025-08-26 DIAGNOSIS — N64.4 BREAST PAIN: ICD-10-CM

## 2025-08-26 PROCEDURE — 77066 DX MAMMO INCL CAD BI: CPT

## (undated) DEVICE — GOWN IMPERVIOUS 2XL BLUE

## (undated) DEVICE — SPECIMEN CONTAINER 3OZ W/FORMALIN 59901

## (undated) DEVICE — TUBING SUCTION MEDI-VAC 1/4"X20' N620A

## (undated) DEVICE — SUCTION MANIFOLD NEPTUNE 2 SYS 1 PORT 702-025-000

## (undated) DEVICE — ENDO BITE BLOCK ADULT OMNI-BLOC

## (undated) DEVICE — ENDO FORCEP BX CAPTURA PRO SPIKE G50696

## (undated) DEVICE — KIT ENDO TURNOVER/PROCEDURE CARRY-ON 101822

## (undated) DEVICE — GLOVE EXAM NITRILE LG PF LATEX FREE 5064

## (undated) DEVICE — SOL WATER IRRIG 500ML BOTTLE 2F7113

## (undated) DEVICE — SYR 50ML SLIP TIP W/O NDL 309654